# Patient Record
Sex: FEMALE | Race: BLACK OR AFRICAN AMERICAN | NOT HISPANIC OR LATINO | Employment: FULL TIME | URBAN - METROPOLITAN AREA
[De-identification: names, ages, dates, MRNs, and addresses within clinical notes are randomized per-mention and may not be internally consistent; named-entity substitution may affect disease eponyms.]

---

## 2022-05-26 ENCOUNTER — HOSPITAL ENCOUNTER (OUTPATIENT)
Facility: HOSPITAL | Age: 52
Discharge: HOME | End: 2022-05-27
Attending: INTERNAL MEDICINE | Admitting: OBSTETRICS & GYNECOLOGY
Payer: COMMERCIAL

## 2022-05-26 DIAGNOSIS — N93.9 ABNORMAL UTERINE BLEEDING (AUB): ICD-10-CM

## 2022-05-26 DIAGNOSIS — D50.0 IRON DEFICIENCY ANEMIA DUE TO CHRONIC BLOOD LOSS: ICD-10-CM

## 2022-05-26 DIAGNOSIS — D64.9 ANEMIA, UNSPECIFIED TYPE: Primary | ICD-10-CM

## 2022-05-26 PROBLEM — E66.01 OBESITY, CLASS III, BMI 40-49.9 (MORBID OBESITY): Status: ACTIVE | Noted: 2022-05-26

## 2022-05-26 LAB
ALBUMIN SERPL-MCNC: 3.4 GM/DL (ref 3.5–5)
ALBUMIN/GLOB SERPL: 0.8 RATIO (ref 1.1–2)
ALP SERPL-CCNC: 53 UNIT/L (ref 40–150)
ALT SERPL-CCNC: 11 UNIT/L (ref 0–55)
APTT PPP: 29 SECONDS
AST SERPL-CCNC: 16 UNIT/L (ref 5–34)
BASOPHILS # BLD AUTO: 0.08 X10(3)/MCL (ref 0–0.2)
BASOPHILS NFR BLD AUTO: 1.2 %
BILIRUBIN DIRECT+TOT PNL SERPL-MCNC: 0.3 MG/DL
BUN SERPL-MCNC: 15.2 MG/DL (ref 9.8–20.1)
CALCIUM SERPL-MCNC: 9.3 MG/DL (ref 8.4–10.2)
CHLORIDE SERPL-SCNC: 108 MMOL/L (ref 98–107)
CO2 SERPL-SCNC: 25 MMOL/L (ref 22–29)
CREAT SERPL-MCNC: 0.73 MG/DL (ref 0.55–1.02)
EOSINOPHIL # BLD AUTO: 0.22 X10(3)/MCL (ref 0–0.9)
EOSINOPHIL NFR BLD AUTO: 3.4 %
ERYTHROCYTE [DISTWIDTH] IN BLOOD BY AUTOMATED COUNT: 22.3 % (ref 11.5–17)
FSH SERPL-ACNC: 40.05 MIU/ML
GLOBULIN SER-MCNC: 4.1 GM/DL (ref 2.4–3.5)
GLUCOSE SERPL-MCNC: 92 MG/DL (ref 74–100)
GROUP & RH: NORMAL
HCT VFR BLD AUTO: 23.6 % (ref 37–47)
HGB BLD-MCNC: 5.8 GM/DL (ref 12–16)
IMM GRANULOCYTES # BLD AUTO: 0.02 X10(3)/MCL (ref 0–0.02)
IMM GRANULOCYTES NFR BLD AUTO: 0.3 % (ref 0–0.43)
INDIRECT COOMBS GEL: NORMAL
LYMPHOCYTES # BLD AUTO: 1.55 X10(3)/MCL (ref 0.6–4.6)
LYMPHOCYTES NFR BLD AUTO: 23.9 %
MCH RBC QN AUTO: 16 PG (ref 27–31)
MCHC RBC AUTO-ENTMCNC: 24.6 MG/DL (ref 33–36)
MCV RBC AUTO: 65.2 FL (ref 80–94)
MONOCYTES # BLD AUTO: 0.53 X10(3)/MCL (ref 0.1–1.3)
MONOCYTES NFR BLD AUTO: 8.2 %
NEUTROPHILS # BLD AUTO: 4.1 X10(3)/MCL (ref 2.1–9.2)
NEUTROPHILS NFR BLD AUTO: 63 %
NRBC BLD AUTO-RTO: 0 %
PLATELET # BLD AUTO: 150 X10(3)/MCL (ref 130–400)
PLATELETS.RETICULATED NFR BLD AUTO: 5.2 % (ref 0.9–11.2)
PMV BLD AUTO: 0 FL (ref 9.4–12.4)
POTASSIUM SERPL-SCNC: 4.5 MMOL/L (ref 3.5–5.1)
PROT SERPL-MCNC: 7.5 GM/DL (ref 6.4–8.3)
RBC # BLD AUTO: 3.62 X10(6)/MCL (ref 4.2–5.4)
SODIUM SERPL-SCNC: 141 MMOL/L (ref 136–145)
TSH SERPL-ACNC: 0.57 UIU/ML (ref 0.35–4.94)
WBC # SPEC AUTO: 6.5 X10(3)/MCL (ref 4.5–11.5)

## 2022-05-26 PROCEDURE — 85025 COMPLETE CBC W/AUTO DIFF WBC: CPT | Performed by: PHYSICIAN ASSISTANT

## 2022-05-26 PROCEDURE — 99285 EMERGENCY DEPT VISIT HI MDM: CPT | Mod: 25

## 2022-05-26 PROCEDURE — 86900 BLOOD TYPING SEROLOGIC ABO: CPT | Performed by: PHYSICIAN ASSISTANT

## 2022-05-26 PROCEDURE — 85610 PROTHROMBIN TIME: CPT | Performed by: PHYSICIAN ASSISTANT

## 2022-05-26 PROCEDURE — G0378 HOSPITAL OBSERVATION PER HR: HCPCS

## 2022-05-26 PROCEDURE — 86901 BLOOD TYPING SEROLOGIC RH(D): CPT | Performed by: PHYSICIAN ASSISTANT

## 2022-05-26 PROCEDURE — 84443 ASSAY THYROID STIM HORMONE: CPT | Performed by: PHYSICIAN ASSISTANT

## 2022-05-26 PROCEDURE — 83001 ASSAY OF GONADOTROPIN (FSH): CPT | Performed by: STUDENT IN AN ORGANIZED HEALTH CARE EDUCATION/TRAINING PROGRAM

## 2022-05-26 PROCEDURE — 80053 COMPREHEN METABOLIC PANEL: CPT | Performed by: PHYSICIAN ASSISTANT

## 2022-05-26 PROCEDURE — 85730 THROMBOPLASTIN TIME PARTIAL: CPT | Performed by: PHYSICIAN ASSISTANT

## 2022-05-26 PROCEDURE — P9040 RBC LEUKOREDUCED IRRADIATED: HCPCS | Performed by: PHYSICIAN ASSISTANT

## 2022-05-26 PROCEDURE — 86920 COMPATIBILITY TEST SPIN: CPT | Performed by: PHYSICIAN ASSISTANT

## 2022-05-26 PROCEDURE — 36415 COLL VENOUS BLD VENIPUNCTURE: CPT | Performed by: PHYSICIAN ASSISTANT

## 2022-05-26 PROCEDURE — 36410 VNPNXR 3YR/> PHY/QHP DX/THER: CPT

## 2022-05-26 RX ORDER — SODIUM CHLORIDE 0.9 % (FLUSH) 0.9 %
10 SYRINGE (ML) INJECTION EVERY 6 HOURS
Status: DISCONTINUED | OUTPATIENT
Start: 2022-05-27 | End: 2022-05-27 | Stop reason: HOSPADM

## 2022-05-26 RX ORDER — HYDROCODONE BITARTRATE AND ACETAMINOPHEN 500; 5 MG/1; MG/1
TABLET ORAL
Status: DISCONTINUED | OUTPATIENT
Start: 2022-05-26 | End: 2022-05-27 | Stop reason: HOSPADM

## 2022-05-26 RX ORDER — SODIUM CHLORIDE 0.9 % (FLUSH) 0.9 %
10 SYRINGE (ML) INJECTION
Status: DISCONTINUED | OUTPATIENT
Start: 2022-05-26 | End: 2022-05-27 | Stop reason: HOSPADM

## 2022-05-26 NOTE — NURSING
1745- House supervisor contacted for update on midline placement. States does not have specific time but order has been called in.     1800- MD Flores called and updated on midline placement status and the blood has not been started yet due to midline not yet being placed. Notified 3 nurses attempted to start IVs on pt and were unsuccessful. MD states that once pt receives blood that was ordered it is ok to call on call MD MD Damian for pt discharge orders if pt wants to go home.

## 2022-05-26 NOTE — ED PROVIDER NOTES
Name: Kely Denise   Age: 51 y.o.  Sex: female    Chief complaint:   Chief Complaint   Patient presents with    Vaginal Bleeding     Pt requesting gyn referral for abnormal uterine bleeding w abnormal US.  REPORTS ONLY SPOTTING AT PRESENT.       Patient arrived with: Private  History obtained from: Patient    Subjective:   Patient with known hx of uterine fibroids and AUB presents today asking for referral to a gynecologist. She says she has been having AUB for years. At present time she is only spotting. Denies any pelvic pain. She reports hx of Mirena IUD, but says it fell out on its own a few years ago.     History reviewed. No pertinent past medical history.  Past Surgical History:   Procedure Laterality Date     SECTION      CHOLECYSTECTOMY       Social History     Socioeconomic History    Marital status: Single   Tobacco Use    Smoking status: Never Smoker    Smokeless tobacco: Never Used     Review of patient's allergies indicates:   Allergen Reactions    Shellfish containing products         Review of Systems   Constitutional: Negative for chills, fever and malaise/fatigue.   HENT: Negative for sore throat.    Eyes: Negative for pain.   Respiratory: Negative for shortness of breath.    Cardiovascular: Negative for chest pain, palpitations and leg swelling.   Gastrointestinal: Negative for abdominal pain, diarrhea, nausea and vomiting.   Genitourinary: Negative for dysuria.        Vaginal bleeding   Musculoskeletal: Negative for myalgias.   Neurological: Negative for headaches.   Endo/Heme/Allergies: Does not bruise/bleed easily.          Objective:     Initial Vitals [22 0959]   BP Pulse Resp Temp SpO2   (!) 145/79 92 16 97.9 °F (36.6 °C) 100 %      MAP       --            Physical Exam  Vitals reviewed.   Constitutional:       General: She is not in acute distress.     Appearance: Normal appearance. She is obese. She is not ill-appearing, toxic-appearing or diaphoretic.   HENT:       Head: Normocephalic and atraumatic.      Mouth/Throat:      Mouth: Mucous membranes are moist.   Eyes:      Extraocular Movements: Extraocular movements intact.      Conjunctiva/sclera: Conjunctivae normal.   Cardiovascular:      Rate and Rhythm: Normal rate and regular rhythm.      Pulses: Normal pulses.      Heart sounds: Normal heart sounds. No murmur heard.  Pulmonary:      Effort: Pulmonary effort is normal.      Breath sounds: Normal breath sounds.   Abdominal:      General: Abdomen is flat. Bowel sounds are normal.      Palpations: Abdomen is soft.      Tenderness: There is no abdominal tenderness.   Genitourinary:     Comments: Pelvic exam details    External exam: normal    Speculum Exam: Cervical os is closed. Small amount of dark blood noted, but no active bleeding. No blood clots. No abnormal appearing discharge or odor.     Bimanual exam: normal. Exam limited due to body habitus.   Musculoskeletal:         General: No deformity.      Cervical back: Neck supple.   Skin:     General: Skin is warm and dry.      Capillary Refill: Capillary refill takes less than 2 seconds.      Coloration: Skin is not jaundiced or pale.   Neurological:      General: No focal deficit present.      Mental Status: She is alert and oriented to person, place, and time. Mental status is at baseline.   Psychiatric:         Mood and Affect: Mood normal.          Records:  Nursing records and triage records reviewed  Prior records reviewed    Labs:  Recent Results (from the past 24 hour(s))   Comprehensive Metabolic Panel    Collection Time: 05/26/22 11:06 AM   Result Value Ref Range    Sodium Level 141 136 - 145 mmol/L    Potassium Level 4.5 3.5 - 5.1 mmol/L    Chloride 108 (H) 98 - 107 mmol/L    Carbon Dioxide 25 22 - 29 mmol/L    Glucose Level 92 74 - 100 mg/dL    Blood Urea Nitrogen 15.2 9.8 - 20.1 mg/dL    Creatinine 0.73 0.55 - 1.02 mg/dL    Calcium Level Total 9.3 8.4 - 10.2 mg/dL    Protein Total 7.5 6.4 - 8.3 gm/dL     Albumin Level 3.4 (L) 3.5 - 5.0 gm/dL    Globulin 4.1 (H) 2.4 - 3.5 gm/dL    Albumin/Globulin Ratio 0.8 (L) 1.1 - 2.0 ratio    Bilirubin Total 0.3 <=1.5 mg/dL    Alkaline Phosphatase 53 40 - 150 unit/L    Alanine Aminotransferase 11 0 - 55 unit/L    Aspartate Aminotransferase 16 5 - 34 unit/L    Estimated GFR- >60 mls/min/1.73/m2   CBC with Differential    Collection Time: 05/26/22 11:06 AM   Result Value Ref Range    WBC 6.5 4.5 - 11.5 x10(3)/mcL    RBC 3.62 (L) 4.20 - 5.40 x10(6)/mcL    Hgb 5.8 (LL) 12.0 - 16.0 gm/dL    Hct 23.6 (L) 37.0 - 47.0 %    MCV 65.2 (L) 80.0 - 94.0 fL    MCH 16.0 (L) 27.0 - 31.0 pg    MCHC 24.6 (L) 33.0 - 36.0 mg/dL    RDW 22.3 (H) 11.5 - 17.0 %    Platelet 150 130 - 400 x10(3)/mcL    MPV 0.0 (L) 9.4 - 12.4 fL    IPF 5.2 0.9 - 11.2 %    Neut % 63.0 %    Lymph % 23.9 %    Mono % 8.2 %    Eos % 3.4 %    Basophil % 1.2 %    Lymph # 1.55 0.6 - 4.6 x10(3)/mcL    Neut # 4.1 2.1 - 9.2 x10(3)/mcL    Mono # 0.53 0.1 - 1.3 x10(3)/mcL    Eos # 0.22 0 - 0.9 x10(3)/mcL    Baso # 0.08 0 - 0.2 x10(3)/mcL    IG# 0.02 (H) 0 - 0.0155 x10(3)/mcL    IG% 0.3 0 - 0.43 %    NRBC% 0.0 %   TSH    Collection Time: 05/26/22 12:13 PM   Result Value Ref Range    Thyroid Stimulating Hormone 0.5711 0.3500 - 4.9400 uIU/mL   Protime-INR    Collection Time: 05/26/22 12:13 PM   Result Value Ref Range    PT 12.9 seconds    INR 0.99 0.00 - 1.30   APTT    Collection Time: 05/26/22 12:13 PM   Result Value Ref Range    PTT 29.0 <150.0 seconds   Prepare RBC 2 Units; Emergency    Collection Time: 05/26/22 12:57 PM   Result Value Ref Range    UNIT NUMBER [id number]     UNIT ABO/RH O POS     DISPENSE STATUS Selected     Unit Expiration [id number]     Product Code Z4693T19     Unit Blood Type Code 5100     CROSSMATCH INTERPRETATION Compatible     UNIT NUMBER [id number]     UNIT ABO/RH O POS     DISPENSE STATUS Selected     Unit Expiration [id number]     Product Code C6962H04     Unit Blood Type Code 5100      CROSSMATCH INTERPRETATION Compatible    Type & Screen    Collection Time: 05/26/22 12:57 PM   Result Value Ref Range    Group & Rh O POS     Indirect Glenda GEL NEG         Images:  No results found.   Imaging Results          US Pelvis Comp with Transvag NON-OB (xpd (In process)  Result time 05/26/22 13:57:02                   Medications:  Medications   0.9%  NaCl infusion (for blood administration) (has no administration in time range)          Medical decision making:       ED Course as of 05/26/22 1414   Thu May 26, 2022   1255 Hemoglobin(!!): 5.8 [SA]   1255 Hematocrit(!): 23.6 [SA]   1255 Hemoglobin(!!): 5.8  Gynecology consulted for management of AUB and significant anemia. Plan is to transfuse 2u pRBC and admit to obs. See consult note.  [SA]      ED Course User Index  [SA] NADINE Hoang        Procedures     I am Dr. Horner and I performed a face to face evaluation of this patient. This case was initially evaluated and a workup started by Lay Peña under my supervision.    I participated in the following activities of this patient's care: the medical history.   I personally performed: the Brief physical exam, the medical decision making.   Evaluation and management service: I agree with the evaluation and management decisions made in this patient's care.    Discussed the case and recommended on management to PA, talked to consultant also and made discussions about the disposition.     Results interpretation: I agree with the study interpretation in this patient's care     See reevaluation area for my evaluation of the pt.      Diagnosis:  Final diagnoses:  [N93.9] Abnormal uterine bleeding (AUB) (Primary)  [D64.9] Anemia, unspecified type  [D50.0] Iron deficiency anemia due to chronic blood loss     Kely JEN Denise admitted for observation and blood transfusion.           (Please note that this chart was completed via voice to text dictation. There may be typographical errors or  substitutions that are unintentional, or uncorrected. Every attempt was made to proofread the chart prior to completion. If there are any questions, please contact the provider for final clarification).       NADINE Hoang  05/26/22 1416       NADINE Hoang  05/26/22 1416       Estrada Horner MD  05/26/22 4563

## 2022-05-26 NOTE — NURSING
Patient arrived for ICU via wheelchair. Patient is alert and oriented without any distress. Symmetrical unlabored respirations noted.

## 2022-05-26 NOTE — NURSING
Attempted to use IV inserted in ED but it was tender to touch and flush. Dr. Flores gave order to insert midline.

## 2022-05-26 NOTE — H&P
[facility] GYNECOLOGY H&P    Subjective:       Kely Denise is a 51 y.o.  who presented to the ED for AUB. patient notes that she has had heavy menses her entire life, with worsening and more irregular bleeding over the last several years. She is now having waxing and waning bleeding that has been spotting most recently. She notes that she has been anemic in the past but hasn't required blood transfusions and isn't currently taking iron.     She denies lightheadness, dizzyness, nausea, vomiting, pallor, syncope, SOB, or fatigue.    On admit she was noted to have an H/H of 5.8/23.6. Last noted H/H from [facility] in  at which time she was .     This patient has been seen in [facility] GYN clinic for AUB and had an IUD placed at that time. She notes IUD expulsed in  and minimally improved her bleeding. She would like to try another form of medical management at this time and is not ready to proceed with hysterectomy unless Provera fails.     Objective:     PSH:  -- H/o CDx2  -- Cholecystectomy Laparoscopic    PMH:  -- Anemia  -- Class III Obesity    Allergies:  Shellfish containing products (?Iodine) - Hives    Home Medications:  None    VITAL SIGNS: 24 HR MIN & MAX Most Recent Vitals 24 HR Intake & Output   Temp  Min: 97.9 °F (36.6 °C)  Max: 97.9 °F (36.6 °C)  97.9 °F (36.6 °C) No intake/output data recorded.   BP  Min: 119/80  Max: 145/79  119/80     Pulse  Min: 87  Max: 92  87     Resp  Min: 16  Max: 18  18     SpO2  Min: 100 %  Max: 100 %  100 %       BP  Min: 119/80  Max: 145/79  Temp  Av.9 °F (36.6 °C)  Min: 97.9 °F (36.6 °C)  Max: 97.9 °F (36.6 °C)  Pulse  Av.5  Min: 87  Max: 92  Resp  Av  Min: 16  Max: 18  SpO2  Av %  Min: 100 %  Max: 100 %  Height  Av' (152.4 cm)  Min: 5' (152.4 cm)  Max: 5' (152.4 cm)  Weight  Av kg (244 lb 11.4 oz)  Min: 111 kg (244 lb 11.4 oz)  Max: 111 kg (244 lb 11.4 oz)    Body mass index is 47.79 kg/m².    Physical Exam:   General appearance: alert,  appears stated age and cooperative  Lungs: no increased WOB  Heart: regular rate  Abdomen: soft, minimally distended  Pelvic: deferred, completed by ED NP  Extremities: No evidence of DVT seen on physical exam.    Labs:   Recent Labs   Lab 05/26/22  1106   WBC 6.5   HGB 5.8*   HCT 23.6*      MCV 65.2*   RDW 22.3*      K 4.5   CO2 25   BUN 15.2   CREATININE 0.73   CALCIUM 9.3   ALBUMIN 3.4*   BILITOT 0.3   AST 16   ALT 11   ALKPHOS 53     Recent Results (from the past 24 hour(s))   Comprehensive Metabolic Panel    Collection Time: 05/26/22 11:06 AM   Result Value Ref Range    Sodium Level 141 136 - 145 mmol/L    Potassium Level 4.5 3.5 - 5.1 mmol/L    Chloride 108 (H) 98 - 107 mmol/L    Carbon Dioxide 25 22 - 29 mmol/L    Glucose Level 92 74 - 100 mg/dL    Blood Urea Nitrogen 15.2 9.8 - 20.1 mg/dL    Creatinine 0.73 0.55 - 1.02 mg/dL    Calcium Level Total 9.3 8.4 - 10.2 mg/dL    Protein Total 7.5 6.4 - 8.3 gm/dL    Albumin Level 3.4 (L) 3.5 - 5.0 gm/dL    Globulin 4.1 (H) 2.4 - 3.5 gm/dL    Albumin/Globulin Ratio 0.8 (L) 1.1 - 2.0 ratio    Bilirubin Total 0.3 <=1.5 mg/dL    Alkaline Phosphatase 53 40 - 150 unit/L    Alanine Aminotransferase 11 0 - 55 unit/L    Aspartate Aminotransferase 16 5 - 34 unit/L    Estimated GFR- >60 mls/min/1.73/m2   CBC with Differential    Collection Time: 05/26/22 11:06 AM   Result Value Ref Range    WBC 6.5 4.5 - 11.5 x10(3)/mcL    RBC 3.62 (L) 4.20 - 5.40 x10(6)/mcL    Hgb 5.8 (LL) 12.0 - 16.0 gm/dL    Hct 23.6 (L) 37.0 - 47.0 %    MCV 65.2 (L) 80.0 - 94.0 fL    MCH 16.0 (L) 27.0 - 31.0 pg    MCHC 24.6 (L) 33.0 - 36.0 mg/dL    RDW 22.3 (H) 11.5 - 17.0 %    Platelet 150 130 - 400 x10(3)/mcL    MPV 0.0 (L) 9.4 - 12.4 fL    IPF 5.2 0.9 - 11.2 %    Neut % 63.0 %    Lymph % 23.9 %    Mono % 8.2 %    Eos % 3.4 %    Basophil % 1.2 %    Lymph # 1.55 0.6 - 4.6 x10(3)/mcL    Neut # 4.1 2.1 - 9.2 x10(3)/mcL    Mono # 0.53 0.1 - 1.3 x10(3)/mcL    Eos # 0.22 0 - 0.9  x10(3)/mcL    Baso # 0.08 0 - 0.2 x10(3)/mcL    IG# 0.02 (H) 0 - 0.0155 x10(3)/mcL    IG% 0.3 0 - 0.43 %    NRBC% 0.0 %   TSH    Collection Time: 22 12:13 PM   Result Value Ref Range    Thyroid Stimulating Hormone 0.5711 0.3500 - 4.9400 uIU/mL   Protime-INR    Collection Time: 22 12:13 PM   Result Value Ref Range    PT 12.9 seconds    INR 0.99 0.00 - 1.30   APTT    Collection Time: 22 12:13 PM   Result Value Ref Range    PTT 29.0 <150.0 seconds     Lines   PIV    Assessment/Plan:       51 y.o.  with history of AUB to anemia, being admitted to observation for transfusion 2u pRBCs.    Active Problem List with Overview Notes    Diagnosis Date Noted    Anemia 2022     -- H/H on presentation 5.8/23.4.  -- Patient remains clinically stable from a vitals and symptomatic standpoint but given critical H/H recommendation was to receive 2u pRBCs. Patient verbalized understanding and amenable to proceeding with transfusion.   -- Will discharge patient with PO iron.       Abnormal uterine bleeding (AUB) 2022     -- TSH normal  -- Follow-up transvaginal ultrasound, FSH  -- Discharge patient with Provera 10mg daily to be continued as patient desires medical management at this time.   -- For EMB in clinic next week and re-establish care with [facility] GYN clinic      Obesity, Class III, BMI 40-49.9 (morbid obesity) 2022     Disposition:   Anticipated disposition plan: discharge patient to home once transfusion is complete    Discussed with Dr. Provost Janet Flores MD PGY-3  Obstetrics and Gynecology

## 2022-05-27 VITALS
WEIGHT: 244.69 LBS | DIASTOLIC BLOOD PRESSURE: 81 MMHG | RESPIRATION RATE: 18 BRPM | SYSTOLIC BLOOD PRESSURE: 118 MMHG | BODY MASS INDEX: 48.04 KG/M2 | TEMPERATURE: 98 F | OXYGEN SATURATION: 99 % | HEART RATE: 95 BPM | HEIGHT: 60 IN

## 2022-05-27 DIAGNOSIS — D69.6 THROMBOCYTOPENIA: Primary | ICD-10-CM

## 2022-05-27 LAB
ABO + RH BLD: NORMAL
ABO + RH BLD: NORMAL
BASOPHILS # BLD AUTO: 0.06 X10(3)/MCL (ref 0–0.2)
BASOPHILS NFR BLD AUTO: 0.8 %
BLD PROD TYP BPU: NORMAL
BLD PROD TYP BPU: NORMAL
BLOOD UNIT EXPIRATION DATE: NORMAL
BLOOD UNIT EXPIRATION DATE: NORMAL
BLOOD UNIT TYPE CODE: 5100
BLOOD UNIT TYPE CODE: 5100
CROSSMATCH INTERPRETATION: NORMAL
CROSSMATCH INTERPRETATION: NORMAL
DISPENSE STATUS: NORMAL
DISPENSE STATUS: NORMAL
EOSINOPHIL # BLD AUTO: 0.18 X10(3)/MCL (ref 0–0.9)
EOSINOPHIL NFR BLD AUTO: 2.4 %
ERYTHROCYTE [DISTWIDTH] IN BLOOD BY AUTOMATED COUNT: 24.4 % (ref 11.5–17)
HCT VFR BLD AUTO: 29.1 % (ref 37–47)
HGB BLD-MCNC: 7.6 GM/DL (ref 12–16)
IMM GRANULOCYTES # BLD AUTO: 0.03 X10(3)/MCL (ref 0–0.02)
IMM GRANULOCYTES NFR BLD AUTO: 0.4 % (ref 0–0.43)
LYMPHOCYTES # BLD AUTO: 1.19 X10(3)/MCL (ref 0.6–4.6)
LYMPHOCYTES NFR BLD AUTO: 15.6 %
MCH RBC QN AUTO: 18.6 PG (ref 27–31)
MCHC RBC AUTO-ENTMCNC: 26.1 MG/DL (ref 33–36)
MCV RBC AUTO: 71.3 FL (ref 80–94)
MONOCYTES # BLD AUTO: 0.11 X10(3)/MCL (ref 0.1–1.3)
MONOCYTES NFR BLD AUTO: 1.4 %
NEUTROPHILS # BLD AUTO: 6.1 X10(3)/MCL (ref 2.1–9.2)
NEUTROPHILS NFR BLD AUTO: 79.4 %
NRBC BLD AUTO-RTO: 0 %
PLATELET # BLD AUTO: 125 X10(3)/MCL (ref 130–400)
PLATELETS.RETICULATED NFR BLD AUTO: 4.6 % (ref 0.9–11.2)
PMV BLD AUTO: 0 FL (ref 9.4–12.4)
RBC # BLD AUTO: 4.08 X10(6)/MCL (ref 4.2–5.4)
UNIT NUMBER: NORMAL
UNIT NUMBER: NORMAL
WBC # SPEC AUTO: 7.6 X10(3)/MCL (ref 4.5–11.5)

## 2022-05-27 PROCEDURE — A4216 STERILE WATER/SALINE, 10 ML: HCPCS | Performed by: OBSTETRICS & GYNECOLOGY

## 2022-05-27 PROCEDURE — G0378 HOSPITAL OBSERVATION PER HR: HCPCS

## 2022-05-27 PROCEDURE — 36430 TRANSFUSION BLD/BLD COMPNT: CPT

## 2022-05-27 PROCEDURE — 94760 N-INVAS EAR/PLS OXIMETRY 1: CPT

## 2022-05-27 PROCEDURE — 36415 COLL VENOUS BLD VENIPUNCTURE: CPT | Performed by: STUDENT IN AN ORGANIZED HEALTH CARE EDUCATION/TRAINING PROGRAM

## 2022-05-27 PROCEDURE — 25000003 PHARM REV CODE 250: Performed by: OBSTETRICS & GYNECOLOGY

## 2022-05-27 PROCEDURE — P9016 RBC LEUKOCYTES REDUCED: HCPCS | Performed by: PHYSICIAN ASSISTANT

## 2022-05-27 PROCEDURE — 85025 COMPLETE CBC W/AUTO DIFF WBC: CPT | Performed by: STUDENT IN AN ORGANIZED HEALTH CARE EDUCATION/TRAINING PROGRAM

## 2022-05-27 RX ORDER — ACETAMINOPHEN 500 MG
1000 TABLET ORAL EVERY 8 HOURS PRN
Status: DISCONTINUED | OUTPATIENT
Start: 2022-05-27 | End: 2022-05-27 | Stop reason: HOSPADM

## 2022-05-27 RX ORDER — MEDROXYPROGESTERONE ACETATE 10 MG/1
10 TABLET ORAL DAILY
Qty: 30 TABLET | Refills: 4 | Status: SHIPPED | OUTPATIENT
Start: 2022-05-27 | End: 2022-10-26 | Stop reason: SDUPTHER

## 2022-05-27 RX ADMIN — SODIUM CHLORIDE, PRESERVATIVE FREE 10 ML: 5 INJECTION INTRAVENOUS at 12:05

## 2022-05-27 RX ADMIN — ACETAMINOPHEN 1000 MG: 500 TABLET ORAL at 03:05

## 2022-05-27 NOTE — DISCHARGE SUMMARY
[facility]  Gynecology  Discharge Summary    Patient Name: Kely Denise  MRN: [id number]  Admission Date: 5/26/2022  Hospital Length of Stay: 0 days  Discharge Date: 5/27/2022  Attending Physician: Susie Thibodeaux MD   Discharging Provider: Janet Flores MD    HPI: 52 y/o female with a long standing history of AUB-L to anemia who presented to the ED with persistent vaginal spotting/bleeding. While in the ED she was noted to have an H/H of 5.8/23.6. She was admitted to observation to receive 2u pRBCs. Patient tolerated the blood well and is feeling well this AM. Repeat H/H 7.6/29.1.     Hospital Course: 52 y/o female with a long standing history of AUB-L to anemia who presented to the ED with persistent vaginal spotting/bleeding. While in the ED she was noted to have an H/H of 5.8/23.6. She was admitted to observation to receive 2u pRBCs. Patient tolerated the blood well and is feeling well this AM. Repeat H/H 7.6/29.1.     Upon review of the differential of the CBC it was noted that she had a significant drop in her platelet count from 150 to 125. Will place a consultation to internal medicine at this time for further work-up and repeat CBC at follow-up visit.    Vitals:    05/27/22 0025 05/27/22 0035 05/27/22 0245 05/27/22 0746   BP: 115/74 113/74 117/80 118/81   Pulse: 86 86 80 95   Resp: 20 20 18    Temp: 98.7 °F (37.1 °C) 98.3 °F (36.8 °C) 98.5 °F (36.9 °C) 98.4 °F (36.9 °C)   TempSrc: Oral Oral Oral Oral   SpO2: 99% 99% 100% 99%   Weight:       Height:         PE:  Constit: AAOx3, NAD  CV: Regular rate  Pulm: CTABL, no increased WOB  Abd: soft, non-distended  Ext: Normal, atraumatic without edema    Significant Diagnostic Studies: Labs:   Recent Labs   Lab 05/26/22  1106      K 4.5   CO2 25   BUN 15.2   CREATININE 0.73   CALCIUM 9.3   ALBUMIN 3.4*   BILITOT 0.3   ALKPHOS 53   AST 16   ALT 11     Recent Labs   Lab 05/26/22  1106 05/27/22  0434   WBC 6.5 7.6   HGB 5.8* 7.6*   HCT 23.6* 29.1*   PLT  150 125*     FSH: 40  TSH: 0.57    Discharged Condition: good  Disposition: To home care    Follow Up: In GYN clinic this upcoming week  Referral provided to Internal Medicine for further work-up of CBC abnormalities    Patient Instructions:   -- Regular diet  -- Activity as tolerated  -- RTC if soaking through >2 pads/hour, Fever >100.4, severe lower abdominal pain/cramping not relieved by medication    Medications:  -- Start PO Provera 10mg    Janet Flores MD  Obstetrics & Gynecology  [facility] - [facility] Surg Telemetry

## 2022-05-27 NOTE — PLAN OF CARE
Problem: Adult Inpatient Plan of Care  Goal: Plan of Care Review  Outcome: Met  Goal: Patient-Specific Goal (Individualized)  Outcome: Met  Goal: Absence of Hospital-Acquired Illness or Injury  Outcome: Met  Goal: Optimal Comfort and Wellbeing  Outcome: Met  Goal: Readiness for Transition of Care  Outcome: Met     Problem: Bariatric Environmental Safety  Goal: Safety Maintained with Care  Outcome: Met     Problem: Infection  Goal: Absence of Infection Signs and Symptoms  Outcome: Met     Problem: Anemia  Goal: Anemia Symptom Improvement  Outcome: Met

## 2022-05-27 NOTE — PROCEDURES
Kely Denise is a 51 y.o. female patient.    Temp: 98.1 °F (36.7 °C) (05/26/22 1900)  Pulse: 80 (05/26/22 1900)  Resp: 16 (05/26/22 1600)  BP: 97/62 (05/26/22 1900)  SpO2: 100 % (05/26/22 1900)  Weight: 111 kg (244 lb 11.4 oz) (05/26/22 1420)  Height: 5' (152.4 cm) (05/26/22 1420)    PICC  Date/Time: 5/26/2022 8:45 PM  Performed by: Aba Hoffman RN  Consent Done: Yes  Time out: Immediately prior to procedure a time out was called to verify the correct patient, procedure, equipment, support staff and site/side marked as required  Indications: med administration and vascular access  Anesthesia: local infiltration  Local anesthetic: lidocaine 1% without epinephrine  Anesthetic Total (mL): 5  Preparation: skin prepped with ChloraPrep  Skin prep agent dried: skin prep agent completely dried prior to procedure  Sterile barriers: all five maximum sterile barriers used - cap, mask, sterile gown, sterile gloves, and large sterile sheet  Hand hygiene: hand hygiene performed prior to central venous catheter insertion  Location details: right cephalic  Catheter type: single lumen  Catheter size: 4 Fr  Catheter Length: 16cm    Ultrasound guidance: yes  Vessel Caliber: large and patent, compressibility normal  Needle advanced into vessel with real time Ultrasound guidance.  Guidewire confirmed in vessel.  Sterile sheath used.  Number of attempts: 1  Post-procedure: blood return through all ports, chlorhexidine patch and sterile dressing applied            Mihaela Hoffman  5/26/2022

## 2022-05-30 ENCOUNTER — TELEPHONE (OUTPATIENT)
Dept: GYNECOLOGY | Facility: CLINIC | Age: 52
End: 2022-05-30
Payer: COMMERCIAL

## 2022-06-01 ENCOUNTER — OFFICE VISIT (OUTPATIENT)
Dept: GYNECOLOGY | Facility: CLINIC | Age: 52
End: 2022-06-01
Payer: COMMERCIAL

## 2022-06-01 VITALS
TEMPERATURE: 99 F | SYSTOLIC BLOOD PRESSURE: 129 MMHG | RESPIRATION RATE: 18 BRPM | HEART RATE: 99 BPM | DIASTOLIC BLOOD PRESSURE: 63 MMHG | BODY MASS INDEX: 49.26 KG/M2 | WEIGHT: 250.88 LBS | OXYGEN SATURATION: 99 % | HEIGHT: 60 IN

## 2022-06-01 DIAGNOSIS — Z00.00 HEALTH CARE MAINTENANCE: ICD-10-CM

## 2022-06-01 DIAGNOSIS — Z12.4 SCREENING FOR CERVICAL CANCER: Primary | ICD-10-CM

## 2022-06-01 DIAGNOSIS — N93.9 ABNORMAL UTERINE BLEEDING (AUB): ICD-10-CM

## 2022-06-01 DIAGNOSIS — D50.0 IRON DEFICIENCY ANEMIA DUE TO CHRONIC BLOOD LOSS: ICD-10-CM

## 2022-06-01 DIAGNOSIS — Z12.31 ENCOUNTER FOR SCREENING MAMMOGRAM FOR MALIGNANT NEOPLASM OF BREAST: ICD-10-CM

## 2022-06-01 PROCEDURE — 87625 HPV TYPES 16 & 18 ONLY: CPT

## 2022-06-01 PROCEDURE — 58100 BIOPSY OF UTERUS LINING: CPT | Mod: PBBFAC,73 | Performed by: STUDENT IN AN ORGANIZED HEALTH CARE EDUCATION/TRAINING PROGRAM

## 2022-06-01 PROCEDURE — 99215 OFFICE O/P EST HI 40 MIN: CPT | Mod: PBBFAC,25

## 2022-06-01 RX ORDER — METHOCARBAMOL 750 MG/1
750 TABLET, FILM COATED ORAL 2 TIMES DAILY
COMMUNITY
Start: 2022-05-20 | End: 2022-10-26

## 2022-06-01 RX ORDER — HYDROCODONE BITARTRATE AND ACETAMINOPHEN 5; 325 MG/1; MG/1
1 TABLET ORAL 2 TIMES DAILY PRN
COMMUNITY
Start: 2022-05-20 | End: 2022-06-29

## 2022-06-01 RX ORDER — MELOXICAM 15 MG/1
15 TABLET ORAL DAILY
Status: ON HOLD | COMMUNITY
Start: 2022-05-20 | End: 2022-06-09 | Stop reason: HOSPADM

## 2022-06-01 NOTE — PROGRESS NOTES
[facility] GYNECOLOGY CLINIC NOTE     Kely Denise is a 51 y.o.  presenting to GYN clinic for follow up following admission - for blood transfusion in setting of AUB-L. Received 2 units pRBCs, H/H barbara to 7.6/29.1 from 5.8/23.6. Started on provera 10mg daily during admission, states bleeding stopped prior to discharge from hospital, has not recurred since this discharge. Denies CP, SOB, lightheadedness or dizziness today.   Pt last seen by us in . Had EMB in clinic notable for endometrial polyp with focal atypical hyperplasia, hysteroscopy D&C subsequently done, pathology notable for secretory endometrium. Pt lost to follow up after . Pt states mirena fell out in , did not help bleeding significantly. At that time did not desire surgical management.     OB History        2    Para   2    Term   2            AB        Living   2       SAB        IAB        Ectopic        Multiple        Live Births   2           Obstetric Comments   BB 8lb 4oz  Triad: -            Past Medical History:   Diagnosis Date    Abnormal Pap smear of cervix     Anemia       Past Surgical History:   Procedure Laterality Date     SECTION       SECTION      CHOLECYSTECTOMY      DILATION AND CURETTAGE OF UTERUS      TUBAL LIGATION        Current Outpatient Medications   Medication Instructions    HYDROcodone-acetaminophen (NORCO) 5-325 mg per tablet 1 tablet, Oral, 2 times daily PRN    medroxyPROGESTERone (PROVERA) 10 mg, Oral, Daily    meloxicam (MOBIC) 15 mg, Oral, Daily    methocarbamoL (ROBAXIN) 750 mg, Oral, 2 times daily     Social History     Tobacco Use    Smoking status: Never Smoker    Smokeless tobacco: Never Used   Substance Use Topics    Alcohol use: Not Currently    Drug use: Never       Review of Systems  Pertinent items are noted in HPI.      Objective:     /63 (BP Location: Left arm)   Pulse 99   Temp 98.6 °F (37 °C)   Resp 18   Ht 5' (1.524  m)   Wt 113.8 kg (250 lb 14.1 oz)   LMP 2022 (Approximate) Comment: 3 months no period  SpO2 99%   BMI 49.00 kg/m²   Physical Exam:  Gen: Well-nourished, well-developed female appearing stated age. Alert, cooperative, in no acute distress.  CV: regular rate  Chest: unlabored WOB  Abdomen: Soft, non-tender  Incisions: well healed infraumbilical vertical scar  Extrem: Extremities normal, atraumatic, non-tender calves.  External genitalia: Normal female genetalia without lesion, discharge or tenderness.   Speculum Exam: Vaginal vault with scant old bood nonodorous, no lesions/masses seen.  Cervical os visualized as closed, no lesions/masses.   Bimanual Exam: No cervical motion tenderness. 18-19cm uterus, bulky fibroid in lower segment. Nonmobile, poor descent.   Pap smear collected today   Note: RN chaperone present for entirety of exam.    Relevant Imaging:  Impression:     Bulky enlarged heterogeneous uterus with multiple fibroids largest measuring 7 cm x 7.3 x 7.7.     Endometrial stripe was not optimally seen.     Anechoic area in the right adnexal region with measurements above sub of difficult to ascertain whether it is of ovarian origin repeat exam or other imaging modalities might prove helpful for further assessment.     Left ovary was not identified      Procedures:   Endometrial biopsy attempted but not completed due to unable to advance Pipelle beyond cervix due to likely obstructing fibroids.     Assessment:       51 y.o.  here for AUB-L follow up.  1. Screening for cervical cancer  Liquid-Based Pap Smear, Screening Screening   2. Abnormal uterine bleeding (AUB)  Ambulatory referral/consult to Gynecology   3. Encounter for screening mammogram for malignant neoplasm of breast  Mammo Digital Screening Bilat   4. Anemia     5. Health care maintenance            Plan:       Problem List Items Addressed This Visit        Renal/    Abnormal uterine bleeding (AUB)     Patient with risk factors  for endometrial hyperplasia including obesity and age. EMB attempted in office but unsuccessful due to obstructing fibroids. Given risk factors discussed need for endometrial sampling. Encouraged pt to continue provera. Given heavy bleeding requiring blood transfusion, large uterus and multiple fibroids on imaging informed patient surgical management via hysterectomy will likely be warranted for definitive management, however need endometrial sampling prior to discussing next steps. Will send to preop to discuss hysteroscopy D&C.               Oncology    Anemia     Continue provera to control bleeding, PO iron to increase blood counts.               Other    Health care maintenance     Pap smear collected today             Other Visit Diagnoses     Screening for cervical cancer    -  Primary    Relevant Orders    Liquid-Based Pap Smear, Screening Screening    Encounter for screening mammogram for malignant neoplasm of breast        Relevant Orders    Mammo Digital Screening Bilat           Return to clinic for preoperative visit    Discussed patient and plan with Dr. Jsesy Roche MD, MPH  [facility], PGY2

## 2022-06-02 DIAGNOSIS — N93.9 ABNORMAL UTERINE BLEEDING (AUB): Primary | ICD-10-CM

## 2022-06-02 DIAGNOSIS — D25.9 UTERINE LEIOMYOMA, UNSPECIFIED LOCATION: ICD-10-CM

## 2022-06-02 NOTE — ASSESSMENT & PLAN NOTE
Patient with risk factors for endometrial hyperplasia including obesity and age. EMB attempted in office but unsuccessful due to obstructing fibroids. Given risk factors discussed need for endometrial sampling. Encouraged pt to continue provera. Given heavy bleeding requiring blood transfusion, large uterus and multiple fibroids on imaging informed patient surgical management via hysterectomy will likely be warranted for definitive management, however need endometrial sampling prior to discussing next steps. Will send to preop to discuss hysteroscopy D&C.

## 2022-06-02 NOTE — PROGRESS NOTES
Surgery Date: 6/9/2022  Pre-operative visit: 6/8/2022    Janet Flores MD PGY-3  Obstetrics and Gynecology

## 2022-06-03 ENCOUNTER — ANESTHESIA EVENT (OUTPATIENT)
Dept: SURGERY | Facility: HOSPITAL | Age: 52
End: 2022-06-03
Payer: COMMERCIAL

## 2022-06-03 NOTE — ANESTHESIA PREPROCEDURE EVALUATION
06/03/2022  Kely eDnise is a 51 y.o., female with PMHx of morbid obesity presents for hysteroscopy, D&C.    COVID STATUS: vaccinated x 2 (last dose 2/12/21)    BETA-BLOCKER: NONE    PROBLEM LIST:  -  AUB, UTERINE FIBROIDS      - S/P 3/10/15 HYST. D&C  -  UPT STATUS  -  MORBID OBESITY  -  ANEMIA - 5/26/22 H&H 5/23, 2 UNITs PRBC --> 5/27/22 H&H 7/29, [id number]      - HOSP. 5/26-5/27/22 for TRANSFUSION  -  CERVICAL, THORACIC (T9-10 CENTRAL DISC PROTRUSION) & LUMBAR DISC DISEASE (L3-4, L4-5, L5-S1 DISC BULGES)    AM Rx DOS: NORCO PRN    ORDERS -   SURGEON: 1/28/15 EKG; 5/26/22 CMP, PT/PTT, TSH; 5/27/22 CBC;  ANESTHESIA: UPT    Pre-op Assessment    I have reviewed the NPO Status.      Review of Systems  Anesthesia Hx:  No problems with previous Anesthesia    Social:  Non-Smoker    Cardiovascular:  Cardiovascular Normal     Pulmonary:  Pulmonary Normal    Renal/:  Renal/ Normal     Hepatic/GI:  Hepatic/GI Normal    Neurological:  Neurology Normal    Endocrine:  Morbid Obesity / BMI > 40    Vitals:    06/09/22 0736 06/09/22 0748 06/09/22 0819 06/09/22 0840   BP:  126/74  126/74   BP Location:  Left arm     Patient Position:  Lying     Pulse:  88     Resp:  16 16    Temp:  36.4 °C (97.5 °F)     TempSrc:  Oral     SpO2:  98%     Weight: 113.7 kg (250 lb 10.6 oz)            Physical Exam  General: Alert, Cooperative and Well nourished    Airway:  Mallampati: II   Mouth Opening: Normal  TM Distance: Normal  Tongue: Normal  Neck ROM: Normal ROM    Dental:  Intact    Chest/Lungs:  Clear to auscultation, Normal Respiratory Rate    Heart:  Rate: Normal  Rhythm: Regular Rhythm  Sounds: Normal       Latest Reference Range & Units 06/09/22 07:55   Preg Test, Ur Negative  Negative     Lab Results   Component Value Date    WBC 7.6 05/27/2022    HGB 7.6 (L) 05/27/2022    HCT 29.1 (L) 05/27/2022    MCV 71.3 (L) 05/27/2022      (L) 05/27/2022       CMP  Sodium Level   Date Value Ref Range Status   05/26/2022 141 136 - 145 mmol/L Final     Potassium Level   Date Value Ref Range Status   05/26/2022 4.5 3.5 - 5.1 mmol/L Final     Carbon Dioxide   Date Value Ref Range Status   05/26/2022 25 22 - 29 mmol/L Final     Blood Urea Nitrogen   Date Value Ref Range Status   05/26/2022 15.2 9.8 - 20.1 mg/dL Final     Creatinine   Date Value Ref Range Status   05/26/2022 0.73 0.55 - 1.02 mg/dL Final     Calcium Level Total   Date Value Ref Range Status   05/26/2022 9.3 8.4 - 10.2 mg/dL Final     Albumin Level   Date Value Ref Range Status   05/26/2022 3.4 (L) 3.5 - 5.0 gm/dL Final     Bilirubin Total   Date Value Ref Range Status   05/26/2022 0.3 <=1.5 mg/dL Final     Alkaline Phosphatase   Date Value Ref Range Status   05/26/2022 53 40 - 150 unit/L Final     Aspartate Aminotransferase   Date Value Ref Range Status   05/26/2022 16 5 - 34 unit/L Final     Alanine Aminotransferase   Date Value Ref Range Status   05/26/2022 11 0 - 55 unit/L Final         Anesthesia Plan  Type of Anesthesia, risks & benefits discussed:    Anesthesia Type: Gen ETT  Intra-op Monitoring Plan: Standard ASA Monitors  Post Op Pain Control Plan: multimodal analgesia  Induction:  IV  Airway Plan: Direct  Informed Consent: Informed consent signed with the Patient and all parties understand the risks and agree with anesthesia plan.  All questions answered.   ASA Score: 2  Day of Surgery Review of History & Physical: H&P Update referred to the surgeon/provider.    Ready For Surgery From Anesthesia Perspective.     .

## 2022-06-06 NOTE — PROGRESS NOTES
[facility] Preoperative Visit History and Physical    HPI:   51 y.o.  with a long standing history of AUB-L presents to discuss hysteroscopy D&C. Patient recently evaluated in ED for AUB and symptomatic anemia and received 2 units pRBCs and was started on provera 10mg, which she has continued with no further bleeding. Failed EMB the following week.     She was first evaluated for AUB in  with EMB showing focal hyperplasia with atypia, HDC notable for secretory endometrium, NEM. Mirena IUD placed at that time, expulsed. Lost to follow up after , stated she has had continued heavy bleeding.      She presents today for preoperative visit for hysteroscopy D&C for endometrial sampling. Note shellfish allergy, will not use betadine for prep.      PMH:  Mild chronic hypertension (110s-140s/60s-90s upon chart review)  Anemia  Chronic back pain  Class III Obesity (50)     ObHx:  LTCS X2     GynHx:  Triad:   LMP: 2022; has had intermittent spotting since  Contraception: BTL  Perimenopausal; FSH 40  Remote hx trichomonas and chlamydia, s/p tx  Remote hx abnorma pap; denies biopsy or excisional procedure     SurgHx:  LTCS x2  BTL  Laparoscopic cholecystectomy     FamHx:  Denies history of breast, ovarian, endometrial, colon cancers.    SocialHx:  Denies tobacco/alcohol/illicit drug use.    All:  Shellfish  Reaction: Hives    Meds:  Mobic daily  Robaxin daily  Provera 10mg daily  Ferrous sulfate    OB History    Para Term  AB Living   2 2 2     2   SAB IAB Ectopic Multiple Live Births           2      # Outcome Date GA Lbr Kevin/2nd Weight Sex Delivery Anes PTL Lv   2 Term     M CS-LTranv  N LESLIE   1 Term     F CS-LTranv  N LESLIE      Obstetric Comments   BB 8lb 4oz   Triad:      Past Medical History:   Diagnosis Date    Abnormal Pap smear of cervix     Anemia      Past Surgical History:   Procedure Laterality Date     SECTION       SECTION       CHOLECYSTECTOMY      DILATION AND CURETTAGE OF UTERUS      TUBAL LIGATION       Prior to Admission medications    Medication Sig Start Date End Date Taking? Authorizing Provider   HYDROcodone-acetaminophen (NORCO) 5-325 mg per tablet Take 1 tablet by mouth 2 (two) times daily as needed. 22   Historical Provider   medroxyPROGESTERone (PROVERA) 10 MG tablet Take 1 tablet (10 mg total) by mouth once daily. 5/27/22 10/24/22  Janet Flores MD   meloxicam (MOBIC) 15 MG tablet Take 15 mg by mouth once daily. 22   Historical Provider   methocarbamoL (ROBAXIN) 750 MG Tab Take 750 mg by mouth 2 (two) times daily. 22   Historical Provider       Family History   Problem Relation Age of Onset    Hypertension Mother        Review of Systems: As stated in HPI.      Objective:     There were no vitals filed for this visit.  There is no height or weight on file to calculate BMI.    PHYSICAL EXAM (pelvic from 22)  GEN: NAD, A&O x3  CV: RRR   LUNGS: CTABL  ABDOMEN: soft, NTND, no masses  INCISIONS: well healed infraumbilical  VAGINA: Moist, no lesions or masses  VULVA: Normal external genitalia  CERVIX: Cervical os visualized as closed, no lesions/masses. No CMT  UTERUS: 18 cm in size, bulky posterior fibroid. Not mobile, poor descent, broad, smooth in contour  ADNEXA: No fullness, masses or tenderness    LABS:  H/H 5.8/23.6 to 7.6/29.1  Last pap: 22- NILM hrHPV neg    IMAGING:  TVUS:   Impression:     Bulky enlarged heterogeneous uterus with multiple fibroids largest measuring 7 cm x 7.3 x 7.7.     Endometrial stripe was not optimally seen.     Anechoic area in the right adnexal region with measurements above sub of difficult to ascertain whether it is of ovarian origin repeat exam or other imaging modalities might prove helpful for further assessment.     Left ovary was not identified.    Assessment:      51 y.o.  with AUB-L presents to discuss [facility] D&C for endometrial sampling.   1. Preoperative exam  for gynecologic surgery     2. Abnormal uterine bleeding (AUB)       Plan:     Problem List Items Addressed This Visit        Renal/    Abnormal uterine bleeding (AUB)     Continue provera 10mg daily for AUB-L since patient does not desire hysterectomy at this time.               Other    Preoperative exam for gynecologic surgery - Primary       1. Counseling: Discussed with patient importance of diagnostic endometrial sampling via hysteroscopy D&C, this allowes for direct visualization of endometrial cavity with directed sampling, and more thorough sampling of endometrium. Counseled patient that removal of endometrial polyp and or fibroids will likely decrease bleeding but amenorrhea cannot be guaranteed. Discussed alternative hormonal options to reduce abnormal bleeding.   2. This procedure and its risks, reasons, benefits and complications (including perforation, subsequent injury to bowel, air/gas embolism, fluid overload, major blood vessel,hemorrhage, possibility of transfusion, infection, scarring, dyspareunia, further surgery, failure of the procedure) were reviewed in detail. Complication rate less than 1% overall.   3. We have reviewed the typical perioperative course with hospital stay, and post-operative precautions and restrictions have been reviewed.    4. Transfusion of blood and blood products discussed. Patient was consented for blood transfusion in the case of an emergency.  She understands the possibility of blood transfusion reaction and the attendant risk of transmission of HIV & Hepatitis C to be 1 in 2 million and the risk of Hepatitis B to be 1 in 200,000.  She is aware of the possibility of transfusion reaction. All questions were answered.   5. Patient understands we are affiliated with a teaching institution and residents and medical students will be involved in her care.  She has consented to an exam under anesthesia and understands that medical students participate in this portion of  the procedure.  All questions were answered.    6. Preop testing ordered.  Surgery case scheduled. Surgical consents signed.  7. Instructions reviewed, including NPO after midnight.   8. Counseled to hold the following medications on the day of surgery: none   9. Current contraception: s/p BTL                   To OR for hysteroscopy D&C with Dr. Thibodeaux   Scheduled on 6/9/22    Discussed with Dr. Provost Va Roche MD, MPH  [facility], PGY2

## 2022-06-08 ENCOUNTER — OFFICE VISIT (OUTPATIENT)
Dept: GYNECOLOGY | Facility: CLINIC | Age: 52
End: 2022-06-08
Payer: COMMERCIAL

## 2022-06-08 ENCOUNTER — TELEPHONE (OUTPATIENT)
Dept: GYNECOLOGY | Facility: CLINIC | Age: 52
End: 2022-06-08
Payer: COMMERCIAL

## 2022-06-08 VITALS
BODY MASS INDEX: 49.22 KG/M2 | OXYGEN SATURATION: 97 % | HEIGHT: 60 IN | RESPIRATION RATE: 18 BRPM | TEMPERATURE: 99 F | SYSTOLIC BLOOD PRESSURE: 136 MMHG | HEART RATE: 94 BPM | WEIGHT: 250.69 LBS | DIASTOLIC BLOOD PRESSURE: 85 MMHG

## 2022-06-08 DIAGNOSIS — N93.9 ABNORMAL UTERINE BLEEDING (AUB): ICD-10-CM

## 2022-06-08 DIAGNOSIS — Z01.818 PREOPERATIVE EXAM FOR GYNECOLOGIC SURGERY: Primary | ICD-10-CM

## 2022-06-08 LAB
HIGH RISK HPV 16 (PRECISION): NEGATIVE
HIGH RISK HPV 18/45 (PRECISION): NEGATIVE
INSULIN SERPL-ACNC: NORMAL U[IU]/ML
LAB AP BETHESDA CATEGORY: NORMAL
LAB AP CLINICAL FINDINGS: NORMAL
LAB AP GYN MOLECULAR TESTING: NORMAL
LAB AP LMP DATE: NORMAL
LAB AP OCHS PAP SPECIMEN ADEQUACY: NORMAL
LAB AP OHS PAP INTERPRETATION: NORMAL
LAB AP PAP DISCLAIMER COMMENTS: NORMAL
LAB AP PAP PREVIOUS BX: NORMAL
LAB AP PAP PRIOR TREATMENT: NORMAL

## 2022-06-08 PROCEDURE — 99214 OFFICE O/P EST MOD 30 MIN: CPT | Mod: PBBFAC

## 2022-06-08 NOTE — ASSESSMENT & PLAN NOTE
1. Counseling: Discussed with patient importance of diagnostic endometrial sampling via hysteroscopy D&C, this allowes for direct visualization of endometrial cavity with directed sampling, and more thorough sampling of endometrium. Counseled patient that removal of endometrial polyp and or fibroids will likely decrease bleeding but amenorrhea cannot be guaranteed. Discussed alternative hormonal options to reduce abnormal bleeding.   2. This procedure and its risks, reasons, benefits and complications (including perforation, subsequent injury to bowel, air/gas embolism, fluid overload, major blood vessel,hemorrhage, possibility of transfusion, infection, scarring, dyspareunia, further surgery, failure of the procedure) were reviewed in detail. Complication rate less than 1% overall.   3. We have reviewed the typical perioperative course with hospital stay, and post-operative precautions and restrictions have been reviewed.    4. Transfusion of blood and blood products discussed. Patient was consented for blood transfusion in the case of an emergency.  She understands the possibility of blood transfusion reaction and the attendant risk of transmission of HIV & Hepatitis C to be 1 in 2 million and the risk of Hepatitis B to be 1 in 200,000.  She is aware of the possibility of transfusion reaction. All questions were answered.   5. Patient understands we are affiliated with a teaching institution and residents and medical students will be involved in her care.  She has consented to an exam under anesthesia and understands that medical students participate in this portion of the procedure.  All questions were answered.    6. Preop testing ordered.  Surgery case scheduled. Surgical consents signed.  7. Instructions reviewed, including NPO after midnight.   8. Counseled to hold the following medications on the day of surgery: none   9. Current contraception: s/p BTL

## 2022-06-09 ENCOUNTER — ANESTHESIA (OUTPATIENT)
Dept: SURGERY | Facility: HOSPITAL | Age: 52
End: 2022-06-09
Payer: COMMERCIAL

## 2022-06-09 ENCOUNTER — HOSPITAL ENCOUNTER (OUTPATIENT)
Facility: HOSPITAL | Age: 52
Discharge: HOME | End: 2022-06-09
Attending: OBSTETRICS & GYNECOLOGY | Admitting: OBSTETRICS & GYNECOLOGY
Payer: COMMERCIAL

## 2022-06-09 DIAGNOSIS — N93.9 ABNORMAL UTERINE BLEEDING (AUB): Primary | ICD-10-CM

## 2022-06-09 DIAGNOSIS — Z01.818 PREOPERATIVE EXAM FOR GYNECOLOGIC SURGERY: ICD-10-CM

## 2022-06-09 DIAGNOSIS — D21.9 FIBROID: ICD-10-CM

## 2022-06-09 DIAGNOSIS — Z98.890 STATUS POST HYSTEROSCOPY: ICD-10-CM

## 2022-06-09 DIAGNOSIS — D64.9 ANEMIA, UNSPECIFIED TYPE: ICD-10-CM

## 2022-06-09 DIAGNOSIS — N85.2 BULKY OR ENLARGED UTERUS: ICD-10-CM

## 2022-06-09 LAB
B-HCG UR QL: NEGATIVE
BASOPHILS # BLD AUTO: 0.04 X10(3)/MCL (ref 0–0.2)
BASOPHILS NFR BLD AUTO: 0.5 %
CTP QC/QA: YES
EOSINOPHIL # BLD AUTO: 0.24 X10(3)/MCL (ref 0–0.9)
EOSINOPHIL NFR BLD AUTO: 2.9 %
ERYTHROCYTE [DISTWIDTH] IN BLOOD BY AUTOMATED COUNT: 27.1 % (ref 11.5–17)
GROUP & RH: NORMAL
HCT VFR BLD AUTO: 32.2 % (ref 37–47)
HGB BLD-MCNC: 8.5 GM/DL (ref 12–16)
IMM GRANULOCYTES # BLD AUTO: 0.02 X10(3)/MCL (ref 0–0.02)
IMM GRANULOCYTES NFR BLD AUTO: 0.2 % (ref 0–0.43)
INDIRECT COOMBS GEL: NORMAL
LYMPHOCYTES # BLD AUTO: 1.47 X10(3)/MCL (ref 0.6–4.6)
LYMPHOCYTES NFR BLD AUTO: 17.6 %
MCH RBC QN AUTO: 19.6 PG (ref 27–31)
MCHC RBC AUTO-ENTMCNC: 26.4 MG/DL (ref 33–36)
MCV RBC AUTO: 74.4 FL (ref 80–94)
MONOCYTES # BLD AUTO: 0.82 X10(3)/MCL (ref 0.1–1.3)
MONOCYTES NFR BLD AUTO: 9.8 %
NEUTROPHILS # BLD AUTO: 5.7 X10(3)/MCL (ref 2.1–9.2)
NEUTROPHILS NFR BLD AUTO: 69 %
NRBC BLD AUTO-RTO: 0 %
PLATELET # BLD AUTO: 599 X10(3)/MCL (ref 130–400)
PMV BLD AUTO: 10.5 FL (ref 9.4–12.4)
RBC # BLD AUTO: 4.33 X10(6)/MCL (ref 4.2–5.4)
WBC # SPEC AUTO: 8.3 X10(3)/MCL (ref 4.5–11.5)

## 2022-06-09 PROCEDURE — 25000003 PHARM REV CODE 250: Performed by: ANESTHESIOLOGY

## 2022-06-09 PROCEDURE — 71000033 HC RECOVERY, INTIAL HOUR: Performed by: OBSTETRICS & GYNECOLOGY

## 2022-06-09 PROCEDURE — 63600175 PHARM REV CODE 636 W HCPCS: Performed by: NURSE ANESTHETIST, CERTIFIED REGISTERED

## 2022-06-09 PROCEDURE — 36000707: Performed by: OBSTETRICS & GYNECOLOGY

## 2022-06-09 PROCEDURE — 36415 COLL VENOUS BLD VENIPUNCTURE: CPT | Performed by: STUDENT IN AN ORGANIZED HEALTH CARE EDUCATION/TRAINING PROGRAM

## 2022-06-09 PROCEDURE — 25000003 PHARM REV CODE 250: Performed by: NURSE ANESTHETIST, CERTIFIED REGISTERED

## 2022-06-09 PROCEDURE — 25000003 PHARM REV CODE 250: Performed by: STUDENT IN AN ORGANIZED HEALTH CARE EDUCATION/TRAINING PROGRAM

## 2022-06-09 PROCEDURE — 86850 RBC ANTIBODY SCREEN: CPT | Performed by: STUDENT IN AN ORGANIZED HEALTH CARE EDUCATION/TRAINING PROGRAM

## 2022-06-09 PROCEDURE — 85025 COMPLETE CBC W/AUTO DIFF WBC: CPT | Performed by: STUDENT IN AN ORGANIZED HEALTH CARE EDUCATION/TRAINING PROGRAM

## 2022-06-09 PROCEDURE — 37000009 HC ANESTHESIA EA ADD 15 MINS: Performed by: OBSTETRICS & GYNECOLOGY

## 2022-06-09 PROCEDURE — 71000016 HC POSTOP RECOV ADDL HR: Performed by: OBSTETRICS & GYNECOLOGY

## 2022-06-09 PROCEDURE — 63600175 PHARM REV CODE 636 W HCPCS

## 2022-06-09 PROCEDURE — 36000706: Performed by: OBSTETRICS & GYNECOLOGY

## 2022-06-09 PROCEDURE — 81025 URINE PREGNANCY TEST: CPT | Performed by: NURSE PRACTITIONER

## 2022-06-09 PROCEDURE — 63600175 PHARM REV CODE 636 W HCPCS: Performed by: ANESTHESIOLOGY

## 2022-06-09 PROCEDURE — 63600175 PHARM REV CODE 636 W HCPCS: Performed by: STUDENT IN AN ORGANIZED HEALTH CARE EDUCATION/TRAINING PROGRAM

## 2022-06-09 PROCEDURE — C1782 MORCELLATOR: HCPCS | Performed by: OBSTETRICS & GYNECOLOGY

## 2022-06-09 PROCEDURE — 71000015 HC POSTOP RECOV 1ST HR: Performed by: OBSTETRICS & GYNECOLOGY

## 2022-06-09 PROCEDURE — 37000008 HC ANESTHESIA 1ST 15 MINUTES: Performed by: OBSTETRICS & GYNECOLOGY

## 2022-06-09 RX ORDER — ONDANSETRON 4 MG/1
8 TABLET, ORALLY DISINTEGRATING ORAL EVERY 8 HOURS PRN
Status: DISCONTINUED | OUTPATIENT
Start: 2022-06-09 | End: 2022-06-09 | Stop reason: HOSPADM

## 2022-06-09 RX ORDER — LIDOCAINE HYDROCHLORIDE 10 MG/ML
1 INJECTION, SOLUTION EPIDURAL; INFILTRATION; INTRACAUDAL; PERINEURAL ONCE
Status: ACTIVE | OUTPATIENT
Start: 2022-06-09

## 2022-06-09 RX ORDER — MIDAZOLAM HYDROCHLORIDE 1 MG/ML
2 INJECTION INTRAMUSCULAR; INTRAVENOUS ONCE AS NEEDED
Status: COMPLETED | OUTPATIENT
Start: 2022-06-09 | End: 2022-06-09

## 2022-06-09 RX ORDER — SODIUM CHLORIDE, SODIUM LACTATE, POTASSIUM CHLORIDE, CALCIUM CHLORIDE 600; 310; 30; 20 MG/100ML; MG/100ML; MG/100ML; MG/100ML
INJECTION, SOLUTION INTRAVENOUS CONTINUOUS
Status: ACTIVE | OUTPATIENT
Start: 2022-06-09

## 2022-06-09 RX ORDER — DEXAMETHASONE SODIUM PHOSPHATE 4 MG/ML
INJECTION, SOLUTION INTRA-ARTICULAR; INTRALESIONAL; INTRAMUSCULAR; INTRAVENOUS; SOFT TISSUE
Status: DISCONTINUED | OUTPATIENT
Start: 2022-06-09 | End: 2022-06-09

## 2022-06-09 RX ORDER — ROCURONIUM BROMIDE 10 MG/ML
INJECTION, SOLUTION INTRAVENOUS
Status: DISCONTINUED | OUTPATIENT
Start: 2022-06-09 | End: 2022-06-09

## 2022-06-09 RX ORDER — PROCHLORPERAZINE EDISYLATE 5 MG/ML
5 INJECTION INTRAMUSCULAR; INTRAVENOUS EVERY 6 HOURS PRN
Status: DISCONTINUED | OUTPATIENT
Start: 2022-06-09 | End: 2022-06-09 | Stop reason: HOSPADM

## 2022-06-09 RX ORDER — MORPHINE SULFATE 10 MG/ML
INJECTION INTRAMUSCULAR; INTRAVENOUS; SUBCUTANEOUS
Status: COMPLETED
Start: 2022-06-09 | End: 2022-06-09

## 2022-06-09 RX ORDER — OXYCODONE HYDROCHLORIDE 5 MG/1
5 TABLET ORAL
Status: DISCONTINUED | OUTPATIENT
Start: 2022-06-09 | End: 2022-06-09 | Stop reason: HOSPADM

## 2022-06-09 RX ORDER — MORPHINE SULFATE 10 MG/ML
2 INJECTION INTRAMUSCULAR; INTRAVENOUS; SUBCUTANEOUS EVERY 5 MIN PRN
Status: COMPLETED | OUTPATIENT
Start: 2022-06-09 | End: 2022-06-09

## 2022-06-09 RX ORDER — TRAMADOL HYDROCHLORIDE 50 MG/1
50 TABLET ORAL
Status: COMPLETED | OUTPATIENT
Start: 2022-06-09 | End: 2022-06-09

## 2022-06-09 RX ORDER — FENTANYL CITRATE 50 UG/ML
INJECTION, SOLUTION INTRAMUSCULAR; INTRAVENOUS
Status: DISCONTINUED | OUTPATIENT
Start: 2022-06-09 | End: 2022-06-09

## 2022-06-09 RX ORDER — GLYCOPYRROLATE 0.2 MG/ML
INJECTION INTRAMUSCULAR; INTRAVENOUS
Status: DISCONTINUED | OUTPATIENT
Start: 2022-06-09 | End: 2022-06-09

## 2022-06-09 RX ORDER — DIPHENHYDRAMINE HCL 25 MG
25 CAPSULE ORAL EVERY 4 HOURS PRN
Status: DISCONTINUED | OUTPATIENT
Start: 2022-06-09 | End: 2022-06-09 | Stop reason: HOSPADM

## 2022-06-09 RX ORDER — ONDANSETRON 2 MG/ML
INJECTION INTRAMUSCULAR; INTRAVENOUS
Status: DISCONTINUED | OUTPATIENT
Start: 2022-06-09 | End: 2022-06-09

## 2022-06-09 RX ORDER — MISOPROSTOL 100 UG/1
200 TABLET ORAL ONCE
Status: COMPLETED | OUTPATIENT
Start: 2022-06-09 | End: 2022-06-09

## 2022-06-09 RX ORDER — ONDANSETRON 2 MG/ML
4 INJECTION INTRAMUSCULAR; INTRAVENOUS DAILY PRN
Status: DISCONTINUED | OUTPATIENT
Start: 2022-06-09 | End: 2022-06-09

## 2022-06-09 RX ORDER — MEPERIDINE HYDROCHLORIDE 25 MG/ML
12.5 INJECTION INTRAMUSCULAR; INTRAVENOUS; SUBCUTANEOUS EVERY 10 MIN PRN
Status: DISCONTINUED | OUTPATIENT
Start: 2022-06-09 | End: 2022-06-09

## 2022-06-09 RX ORDER — LIDOCAINE HYDROCHLORIDE 20 MG/ML
INJECTION, SOLUTION EPIDURAL; INFILTRATION; INTRACAUDAL; PERINEURAL
Status: DISCONTINUED | OUTPATIENT
Start: 2022-06-09 | End: 2022-06-09

## 2022-06-09 RX ORDER — ACETAMINOPHEN 500 MG
1000 TABLET ORAL
Status: COMPLETED | OUTPATIENT
Start: 2022-06-09 | End: 2022-06-09

## 2022-06-09 RX ORDER — CEFAZOLIN SODIUM 1 G/3ML
INJECTION, POWDER, FOR SOLUTION INTRAMUSCULAR; INTRAVENOUS
Status: DISCONTINUED | OUTPATIENT
Start: 2022-06-09 | End: 2022-06-09

## 2022-06-09 RX ORDER — KETOROLAC TROMETHAMINE 30 MG/ML
INJECTION, SOLUTION INTRAMUSCULAR; INTRAVENOUS
Status: DISCONTINUED | OUTPATIENT
Start: 2022-06-09 | End: 2022-06-09

## 2022-06-09 RX ORDER — NEOSTIGMINE METHYLSULFATE 1 MG/ML
INJECTION, SOLUTION INTRAVENOUS
Status: DISCONTINUED | OUTPATIENT
Start: 2022-06-09 | End: 2022-06-09

## 2022-06-09 RX ORDER — MUPIROCIN 20 MG/G
OINTMENT TOPICAL
Status: DISCONTINUED | OUTPATIENT
Start: 2022-06-09 | End: 2022-06-09 | Stop reason: HOSPADM

## 2022-06-09 RX ORDER — SODIUM CHLORIDE, SODIUM LACTATE, POTASSIUM CHLORIDE, CALCIUM CHLORIDE 600; 310; 30; 20 MG/100ML; MG/100ML; MG/100ML; MG/100ML
INJECTION, SOLUTION INTRAVENOUS CONTINUOUS
Status: DISCONTINUED | OUTPATIENT
Start: 2022-06-09 | End: 2022-06-09 | Stop reason: HOSPADM

## 2022-06-09 RX ORDER — DIPHENHYDRAMINE HYDROCHLORIDE 50 MG/ML
25 INJECTION INTRAMUSCULAR; INTRAVENOUS EVERY 4 HOURS PRN
Status: DISCONTINUED | OUTPATIENT
Start: 2022-06-09 | End: 2022-06-09 | Stop reason: HOSPADM

## 2022-06-09 RX ORDER — IBUPROFEN 600 MG/1
600 TABLET ORAL EVERY 6 HOURS PRN
Qty: 56 TABLET | Refills: 0 | Status: SHIPPED | OUTPATIENT
Start: 2022-06-09 | End: 2022-06-16 | Stop reason: SDUPTHER

## 2022-06-09 RX ORDER — PROPOFOL 10 MG/ML
VIAL (ML) INTRAVENOUS
Status: DISCONTINUED | OUTPATIENT
Start: 2022-06-09 | End: 2022-06-09

## 2022-06-09 RX ADMIN — MORPHINE SULFATE 2 MG: 10 INJECTION, SOLUTION INTRAMUSCULAR; INTRAVENOUS at 11:06

## 2022-06-09 RX ADMIN — SODIUM CHLORIDE, POTASSIUM CHLORIDE, SODIUM LACTATE AND CALCIUM CHLORIDE: 600; 310; 30; 20 INJECTION, SOLUTION INTRAVENOUS at 08:06

## 2022-06-09 RX ADMIN — MIDAZOLAM HYDROCHLORIDE 2 MG: 1 INJECTION, SOLUTION INTRAMUSCULAR; INTRAVENOUS at 08:06

## 2022-06-09 RX ADMIN — LIDOCAINE HYDROCHLORIDE 80 MG: 20 INJECTION, SOLUTION EPIDURAL; INFILTRATION; INTRACAUDAL; PERINEURAL at 09:06

## 2022-06-09 RX ADMIN — TRAMADOL HYDROCHLORIDE 50 MG: 50 TABLET, COATED ORAL at 08:06

## 2022-06-09 RX ADMIN — KETOROLAC TROMETHAMINE 30 MG: 30 INJECTION, SOLUTION INTRAMUSCULAR; INTRAVENOUS at 09:06

## 2022-06-09 RX ADMIN — ACETAMINOPHEN 1000 MG: 500 TABLET ORAL at 08:06

## 2022-06-09 RX ADMIN — DEXAMETHASONE SODIUM PHOSPHATE 8 MG: 4 INJECTION, SOLUTION INTRA-ARTICULAR; INTRALESIONAL; INTRAMUSCULAR; INTRAVENOUS; SOFT TISSUE at 09:06

## 2022-06-09 RX ADMIN — OXYCODONE HYDROCHLORIDE 5 MG: 5 TABLET ORAL at 11:06

## 2022-06-09 RX ADMIN — GLYCOPYRROLATE 0.8 MG: 0.2 INJECTION INTRAMUSCULAR; INTRAVENOUS at 10:06

## 2022-06-09 RX ADMIN — PROPOFOL 200 MG: 10 INJECTION, EMULSION INTRAVENOUS at 09:06

## 2022-06-09 RX ADMIN — ONDANSETRON 4 MG: 2 INJECTION INTRAMUSCULAR; INTRAVENOUS at 10:06

## 2022-06-09 RX ADMIN — FENTANYL CITRATE 100 MCG: 50 INJECTION INTRAMUSCULAR; INTRAVENOUS at 09:06

## 2022-06-09 RX ADMIN — ROCURONIUM BROMIDE 40 MG: 10 SOLUTION INTRAVENOUS at 09:06

## 2022-06-09 RX ADMIN — NEOSTIGMINE METHYLSULFATE 5 MG: 1 INJECTION INTRAVENOUS at 10:06

## 2022-06-09 RX ADMIN — MISOPROSTOL 200 MCG: 100 TABLET ORAL at 08:06

## 2022-06-09 RX ADMIN — MEPERIDINE HYDROCHLORIDE 12.5 MG: 25 INJECTION INTRAMUSCULAR; INTRAVENOUS; SUBCUTANEOUS at 11:06

## 2022-06-09 NOTE — TRANSFER OF CARE
Anesthesia Transfer of Care Note    Patient: Kely Denise    Procedure(s) Performed: Procedure(s) (LRB):  HYSTEROSCOPY, WITH DILATION AND CURETTAGE OF UTERUS (N/A)    Patient location: PACU    Anesthesia Type: general    Transport from OR: Transported from OR on room air with adequate spontaneous ventilation    Post pain: adequate analgesia    Post assessment: no apparent anesthetic complications    Post vital signs: stable    Level of consciousness: sedated    Nausea/Vomiting: no nausea/vomiting    Complications: none    Transfer of care protocol was followed      VSS IN PACU

## 2022-06-09 NOTE — OP NOTE
[facility]  Gynecology  Operative Note    SUMMARY     Date of Procedure: 6/9/2022     Procedure: Procedure(s) (LRB):  HYSTEROSCOPY, WITH DILATION AND CURETTAGE OF UTERUS (N/A)     Surgeon(s) and Role:     * Susie Thibodeaux MD - Primary     * Danielle Damian MD - Resident - Assisting     * Va Roche MD - Resident - Assisting    Pre-Operative Diagnosis: Abnormal uterine bleeding (AUB) [N93.9]  Fibroid uterus  Uteromegaly   Anemia    Post-Operative Diagnosis: Post-Op Diagnosis Codes:     * Abnormal uterine bleeding (AUB) [N93.9]  Fibroid uterus  Uteromegaly   Anemia    Anesthesia: General    Estimated Blood Loss: 50 mL  UOP: 50 ml clear yellow urine  IVF: 750 ml  Fluid deficit: 75 mL    Operative Findings (including complications, if any): Normal appearing external genitalia. Well estrogenized vaginal mucosa. Cervix without lesions or masses, scant blood at external os. 16 week size uterus, large anterior fibroid, 7-8cm. Uterus mobile, good capacity, moderate descent. No adnexal masses.      Hysteroscopy: Large anterior fibroid occupying approx 80% of uterine cavity. Thin proliferative phase endometrium throughout, filmy tissue noted inferiorly in uterine cavity.Tubal ostia not visualized. No cervical masses nor polyps.         Description of Technical Procedures:  The patient was taken to the operating room with IVF running. SCDs were placed on bilateral lower extremities for DVT prophylaxis.  General anesthesia was obtained without difficulty and found to be adequate.  She was placed in the dorsal lithotomy position with legs in Remi type stirrups.  Exam under anesthesia revealed the findings as above. She was prepped and draped in the normal sterile fashion. A straight catheter was placed to drain the bladder. A weighted speculum was placed into posterior aspect of the vagina. A right angle retractor was placed into the anterior aspect of the vagina. The anterior lip of the  cervix was grasped with a single-toothed tenaculum.  The cervix was sequentially dilated to 5mm using Hegar dialators. A 0 degree myosure hysteroscope was introduced into the cervix with hydrodistension. The above findings were then noted. Photographs were taken of the endometrial cavity. Myosure device then introduced through hysteroscope into endometrial cavity, directed biopsies taken of filmy tissue described above.The hysteroscope was then removed. The uterus was curetted in a clockwise fashion until a gritty feeling was noted in all aspects of the uterus. The directed biopsies and endometrial scrapings were sent to pathology. The tenaculum was removed from the cervix and bleeding noted on the left, repaired with 3-0 vicryl on SH. Hemostasis then noted from both tenaculum sites.     The patient tolerated the procedure well. The instrument and sponge counts were correct times two. The patient awaked from anesthesia and was taken to recovery in stable condition.    Dr. Thibodeaux was present for the entire procedure           Implants: * No implants in log *    Specimens:   Specimen (24h ago, onward)             Start     Ordered    06/09/22 1037  Specimen to Pathology  RELEASE UPON ORDERING        References:    Click here for ordering Quick Tip   Question:  Release to patient  Answer:  Immediate    06/09/22 1037                        Condition: Good    Disposition: PACU - hemodynamically stable.    Va Roche MD, MPH  [facility], PGY2

## 2022-06-09 NOTE — ANESTHESIA PROCEDURE NOTES
Intubation    Date/Time: 6/9/2022 9:32 AM  Performed by: Lauryn Simmons CRNA  Authorized by: Kaitlin Montez MD     Intubation:     Induction:  Intravenous    Intubated:  Postinduction    Attempts:  1    Attempted By:  CRNA    Method of Intubation:  Direct    Blade:  Madden 2    Laryngeal View Grade: Grade I - full view of cords      Difficult Airway Encountered?: No      Complications:  None    Airway Device:  Oral endotracheal tube    Airway Device Size:  7.0    Style/Cuff Inflation:  Cuffed (inflated to minimal occlusive pressure)    Inflation Amount (mL):  5    Tube secured:  21    Secured at:  The lips    Placement Verified By:  Capnometry    Complicating Factors:  None    Findings Post-Intubation:  BS equal bilateral and atraumatic/condition of teeth unchanged

## 2022-06-09 NOTE — DISCHARGE SUMMARY
[facility] - Periop Services  Brief Operative Note/Discharge Summary    Surgery Date: 6/9/2022     Surgeon(s) and Role:     * Susie Thibodeaux MD - Primary     * Danielle Damian MD - Resident - Assisting     * Va Roche MD - Resident - Assisting    Pre-op Diagnosis:   Abnormal uterine bleeding (AUB) [N93.9]  Fibroid uterus  Uteromegaly   Anemia    Post-op Diagnosis:  Post-Op Diagnosis Codes:     * Abnormal uterine bleeding (AUB) [N93.9]  Fibroid uterus  Uteromegaly   Anemia    Procedure(s) (LRB):  HYSTEROSCOPY, WITH DILATION AND CURETTAGE OF UTERUS (N/A)    Anesthesia: General    Operative Findings: Normal appearing external genitalia. Well estrogenized vaginal mucosa. Cervix without lesions or masses, scant blood at external os. 16 week size uterus, large anterior fibroid, 7-8cm. Uterus mobile, good capacity, moderate descent. No adnexal masses.     Hysteroscopy: Large anterior fibroid occupying approx 80% of uterine cavity. Thin proliferative phase endometrium throughout, filmy tissue noted inferiorly in uterine cavity.Tubal ostia not visualized. No cervical masses nor polyps.         Estimated Blood Loss: 50 mL  UOP: 50 ml clear yellow urine  IVF: 750 ml  Fluid deficit: 75 mL    Specimens:   Specimen (24h ago, onward)             Start     Ordered    06/09/22 1037  Specimen to Pathology  RELEASE UPON ORDERING        References:    Click here for ordering Quick Tip   Question:  Release to patient  Answer:  Immediate    06/09/22 1037              Discharge Note    OUTCOME: Patient tolerated treatment/procedure well without complication and is now ready for discharge.    DISPOSITION: Home or Self Care    FINAL DIAGNOSIS:  Abnormal uterine bleeding (AUB); uterine fibroids; uteromegaly    FOLLOWUP: In clinic    DISCHARGE INSTRUCTIONS:    Discharge Procedure Orders   Diet Adult Regular     Diet general     Pelvic Rest   Order Comments: For 1-2 weeks     Notify your health care provider if you  experience any of the following:  temperature >100.4     Notify your health care provider if you experience any of the following:  severe uncontrolled pain     Notify your health care provider if you experience any of the following:   Order Comments: Soaking >1 pad per hour, foul smelling discharge.     Pelvic Rest     Call MD for:   Order Comments: Call clinic or go to ED with fever >101, intractable nausea/vomiting with inability to tolerate PO, heavy vaginal bleeding, pain refractory to medications     No dressing needed     Activity as tolerated     Va Roche MD, MPH  [facility], PGY2

## 2022-06-09 NOTE — H&P
[facility] Preop H&P Update     I have seen and examined the patient on the morning of her surgery. Patient endorses spotting since yesterday evening. Otherwise there are no changes in her medical history since the time the H&P was performed.     /74 (BP Location: Left arm, Patient Position: Lying)   Pulse 88   Temp 97.5 °F (36.4 °C) (Oral)   Resp 16   Wt 113.7 kg (250 lb 10.6 oz)   SpO2 98%   Breastfeeding No   BMI 48.95 kg/m²        She would like her son Jamil ([phone]) to be contacted after her surgery and the details of her surgery shared.   She was able to explain the procedure in her own words.  Postoperative care was briefly re-reviewed    She desires to proceed with surgery as planned this morning.    ERAS Protocol   DVT PPX: SCDs  ABX: none    TO OR for hysteroscopy D&C    Va Roche MD, MPH  [facility], PGY2      HPI:   51 y.o.  with a long standing history of AUB-L, anemia, uteromegaly (18cm uterus) presents to discuss hysteroscopy D&C. Patient recently evaluated in ED for AUB and symptomatic anemia and received 2 units pRBCs and was started on provera 10mg, which she has continued with no further bleeding. Failed EMB the following week.      She was first evaluated for AUB in  with EMB showing focal hyperplasia with atypia, HDC notable for secretory endometrium, NEM. Mirena IUD placed at that time, expulsed. Lost to follow up after , stated she has had continued heavy bleeding.      She presents today for preoperative visit for hysteroscopy D&C for endometrial sampling. Note shellfish allergy, will not use betadine for prep.      PMH:  Mild chronic hypertension (110s-140s/60s-90s upon chart review)  Anemia  Chronic back pain  Class III Obesity (49)      ObHx:  LTCS X2      GynHx:  Triad: -  LMP: 2022; has had intermittent spotting since  Contraception: BTL  Perimenopausal; FSH 40  Remote hx trichomonas and chlamydia, s/p tx  Remote hx abnorma pap; denies biopsy  or excisional procedure     SurgHx:  LTCS x2  BTL  Laparoscopic cholecystectomy      FamHx:  Denies history of breast, ovarian, endometrial, colon cancers.     SocialHx:  Denies tobacco/alcohol/illicit drug use.     All:  Shellfish  Reaction: Hives     Meds:  Mobic daily  Robaxin daily  Provera 10mg daily  Ferrous sulfate                      OB History    Para Term  AB Living   2 2 2     2   SAB IAB Ectopic Multiple Live Births              2          # Outcome Date GA Lbr Kevin/2nd Weight Sex Delivery Anes PTL Lv   2 Term        M CS-LTranv   N LESLIE   1 Term        F CS-LTranv   N LESLIE       Obstetric Comments   BB 8lb 4oz   Triad: -      Past Medical History:   Diagnosis Date    Abnormal Pap smear of cervix      Anemia              Past Surgical History:   Procedure Laterality Date     SECTION         SECTION        CHOLECYSTECTOMY        DILATION AND CURETTAGE OF UTERUS        TUBAL LIGATION                  Prior to Admission medications    Medication Sig Start Date End Date Taking? Authorizing Provider   HYDROcodone-acetaminophen (NORCO) 5-325 mg per tablet Take 1 tablet by mouth 2 (two) times daily as needed. 22     Historical Provider   medroxyPROGESTERone (PROVERA) 10 MG tablet Take 1 tablet (10 mg total) by mouth once daily. 5/27/22 10/24/22   Janet Flores MD   meloxicam (MOBIC) 15 MG tablet Take 15 mg by mouth once daily. 22     Historical Provider   methocarbamoL (ROBAXIN) 750 MG Tab Take 750 mg by mouth 2 (two) times daily. 22     Historical Provider               Family History   Problem Relation Age of Onset    Hypertension Mother           Review of Systems: As stated in HPI.       Objective:      Vitals   There were no vitals filed for this visit.     There is no height or weight on file to calculate BMI.     PHYSICAL EXAM (pelvic from 22)  GEN: NAD, A&O x3  CV: RRR   LUNGS: CTABL  ABDOMEN: soft, NTND, no masses  INCISIONS: well  healed infraumbilical  VAGINA: Moist, no lesions or masses  VULVA: Normal external genitalia  CERVIX: Cervical os visualized as closed, no lesions/masses. No CMT  UTERUS: 18 cm in size, bulky posterior fibroid. Not mobile, poor descent, broad, smooth in contour  ADNEXA: No fullness, masses or tenderness     LABS:  H/H 5.8/23.6 to 7.6/29.1  Last pap: 22- NILM hrHPV neg     IMAGING:  TVUS:   Impression:     Bulky enlarged heterogeneous uterus with multiple fibroids largest measuring 7 cm x 7.3 x 7.7.     Endometrial stripe was not optimally seen.     Anechoic area in the right adnexal region with measurements above sub of difficult to ascertain whether it is of ovarian origin repeat exam or other imaging modalities might prove helpful for further assessment.     Left ovary was not identified.     Assessment:      51 y.o.  with AUB-L presents to discuss HSC D&C for endometrial sampling.   1. Preoperative exam for gynecologic surgery      2. Abnormal uterine bleeding (AUB)         Plan:           Problem List Items Addressed This Visit            Renal/      Abnormal uterine bleeding (AUB)        Continue provera 10mg daily for AUB-L since patient does not desire hysterectomy at this time.                      Other      Preoperative exam for gynecologic surgery - Primary           1. Counseling: Discussed with patient importance of diagnostic endometrial sampling via hysteroscopy D&C, this allowes for direct visualization of endometrial cavity with directed sampling, and more thorough sampling of endometrium. Counseled patient that removal of endometrial polyp and or fibroids will likely decrease bleeding but amenorrhea cannot be guaranteed. Discussed alternative hormonal options to reduce abnormal bleeding.   2. This procedure and its risks, reasons, benefits and complications (including perforation, subsequent injury to bowel, air/gas embolism, fluid overload, major blood vessel,hemorrhage, possibility  of transfusion, infection, scarring, dyspareunia, further surgery, failure of the procedure) were reviewed in detail. Complication rate less than 1% overall.   3. We have reviewed the typical perioperative course with hospital stay, and post-operative precautions and restrictions have been reviewed.    4. Transfusion of blood and blood products discussed. Patient was consented for blood transfusion in the case of an emergency.  She understands the possibility of blood transfusion reaction and the attendant risk of transmission of HIV & Hepatitis C to be 1 in 2 million and the risk of Hepatitis B to be 1 in 200,000.  She is aware of the possibility of transfusion reaction. All questions were answered.   5. Patient understands we are affiliated with a teaching institution and residents and medical students will be involved in her care.  She has consented to an exam under anesthesia and understands that medical students participate in this portion of the procedure.  All questions were answered.    6. Preop testing ordered.  Surgery case scheduled. Surgical consents signed.  7. Instructions reviewed, including NPO after midnight.   8. Counseled to hold the following medications on the day of surgery: none   9. Current contraception: s/p BTL                          To OR for hysteroscopy D&C with Dr. Thibodeaux   Scheduled on 6/9/22     Discussed with Dr. Provost Va Roche MD, MPH  [facility], PGY2

## 2022-06-09 NOTE — ANESTHESIA POSTPROCEDURE EVALUATION
Anesthesia Post Evaluation    Patient: Kely Denise    Procedure(s) Performed: Procedure(s) (LRB):  HYSTEROSCOPY, WITH DILATION AND CURETTAGE OF UTERUS (N/A)    Final Anesthesia Type: general      Patient location during evaluation: PACU  Level of consciousness: awake  Post-procedure vital signs: reviewed and stable  Airway patency: patent      Anesthetic complications: no      Cardiovascular status: hemodynamically stable  Respiratory status: spontaneous ventilation  Follow-up not needed.          Vitals Value Taken Time   /79 06/09/22 1125   Temp 36.4 °C (97.5 °F) 06/09/22 1120   Pulse 74 06/09/22 1125   Resp 18 06/09/22 1153   SpO2 99 % 06/09/22 1125         Event Time   Out of Recovery 11:35:00         Pain/Herminia Score: Pain Rating Prior to Med Admin: 10 (6/9/2022 11:53 AM)  Pain Rating Post Med Admin: 6 (6/9/2022 11:20 AM)  Herminia Score: 10 (6/9/2022 11:35 AM)

## 2022-06-09 NOTE — DISCHARGE INSTRUCTIONS
[facility]-will call you to set up your follow up appointment    · Pelvic rest for ___2 weeks_ (NO baths, NO soaking, tampons, douches or sex).    · Take pain medication as prescribed. (Ibuprofen) If you are experiencing severe pain even with your pain medications, please call the Womens clinic at [phone] to notify your doctor.    · Take over the counter stool softener such as Colace and/or laxatives such as Miralax for constipation. Do not strain to have a bowel movement.    · Brace belly with pillow when coughing/sneezing/deep breathing.    · No driving or consuming alcohol for the next 24 hours.    · Some bleeding/spotting is to be expected for several days.    · Notify MD of bleeding more than 1 pad per hour, any moderate to severe pain unrelieved by pain medicine or for any signs of infection including fever above 100.4, yellow/green foul-smelling drainage, nausea or vomiting. Clinics number: [phone], or after business hours or emergency call [phone].    Thanks for choosing [facility]! Have a smooth recovery!

## 2022-06-10 VITALS
WEIGHT: 250.69 LBS | OXYGEN SATURATION: 98 % | HEART RATE: 82 BPM | BODY MASS INDEX: 48.95 KG/M2 | RESPIRATION RATE: 20 BRPM | SYSTOLIC BLOOD PRESSURE: 127 MMHG | DIASTOLIC BLOOD PRESSURE: 77 MMHG | TEMPERATURE: 97 F

## 2022-06-13 LAB
ESTROGEN SERPL-MCNC: NORMAL PG/ML
INSULIN SERPL-ACNC: NORMAL U[IU]/ML
LAB AP CLINICAL INFORMATION: NORMAL
LAB AP GROSS DESCRIPTION: NORMAL
LAB AP REPORT FOOTNOTES: NORMAL
T3RU NFR SERPL: NORMAL %

## 2022-06-16 ENCOUNTER — OFFICE VISIT (OUTPATIENT)
Dept: INTERNAL MEDICINE | Facility: CLINIC | Age: 52
End: 2022-06-16
Payer: COMMERCIAL

## 2022-06-16 VITALS
BODY MASS INDEX: 47.39 KG/M2 | OXYGEN SATURATION: 100 % | HEART RATE: 99 BPM | SYSTOLIC BLOOD PRESSURE: 126 MMHG | DIASTOLIC BLOOD PRESSURE: 82 MMHG | HEIGHT: 61 IN | TEMPERATURE: 99 F | WEIGHT: 251 LBS

## 2022-06-16 DIAGNOSIS — E66.01 OBESITY, CLASS III, BMI 40-49.9 (MORBID OBESITY): ICD-10-CM

## 2022-06-16 DIAGNOSIS — N93.9 ABNORMAL UTERINE BLEEDING (AUB): Primary | ICD-10-CM

## 2022-06-16 DIAGNOSIS — Z12.11 COLON CANCER SCREENING: ICD-10-CM

## 2022-06-16 DIAGNOSIS — Z00.00 WELL ADULT EXAM: ICD-10-CM

## 2022-06-16 DIAGNOSIS — D50.0 IRON DEFICIENCY ANEMIA DUE TO CHRONIC BLOOD LOSS: ICD-10-CM

## 2022-06-16 DIAGNOSIS — Z13.6 SCREENING FOR HYPERTENSION: ICD-10-CM

## 2022-06-16 DIAGNOSIS — Z12.39 ENCOUNTER FOR SCREENING FOR MALIGNANT NEOPLASM OF BREAST, UNSPECIFIED SCREENING MODALITY: ICD-10-CM

## 2022-06-16 PROBLEM — D64.9 ANEMIA: Status: RESOLVED | Noted: 2022-06-09 | Resolved: 2022-06-16

## 2022-06-16 PROCEDURE — 99204 OFFICE O/P NEW MOD 45 MIN: CPT | Mod: S$PBB,,, | Performed by: NURSE PRACTITIONER

## 2022-06-16 PROCEDURE — 99214 OFFICE O/P EST MOD 30 MIN: CPT | Mod: PBBFAC | Performed by: NURSE PRACTITIONER

## 2022-06-16 PROCEDURE — 99204 PR OFFICE/OUTPT VISIT, NEW, LEVL IV, 45-59 MIN: ICD-10-PCS | Mod: S$PBB,,, | Performed by: NURSE PRACTITIONER

## 2022-06-16 RX ORDER — IBUPROFEN 600 MG/1
600 TABLET ORAL EVERY 6 HOURS PRN
Qty: 60 TABLET | Refills: 2 | Status: SHIPPED | OUTPATIENT
Start: 2022-06-16 | End: 2022-06-30
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
## 2022-06-29 ENCOUNTER — CLINICAL SUPPORT (OUTPATIENT)
Dept: GYNECOLOGY | Facility: CLINIC | Age: 52
End: 2022-06-29
Payer: COMMERCIAL

## 2022-06-29 DIAGNOSIS — D64.9 ANEMIA, UNSPECIFIED TYPE: ICD-10-CM

## 2022-06-29 DIAGNOSIS — N93.9 ABNORMAL UTERINE BLEEDING (AUB): Primary | ICD-10-CM

## 2022-06-29 RX ORDER — FERROUS SULFATE 324(65)MG
324 TABLET, DELAYED RELEASE (ENTERIC COATED) ORAL 2 TIMES DAILY
Qty: 60 TABLET | Refills: 2 | Status: SHIPPED | OUTPATIENT
Start: 2022-06-29 | End: 2022-10-26 | Stop reason: SDUPTHER

## 2022-06-29 NOTE — PROGRESS NOTES
Established Patient - Audio Only Telehealth Visit     The patient location is: home  The chief complaint leading to consultation is: followup from hysteroscopy d&C   Visit type: Virtual visit with audio only (telephone)  Total time spent with patient: 15 minutes       The reason for the audio only service rather than synchronous audio and video virtual visit was related to technical difficulties or patient preference/necessity.     Each patient to whom I provide medical services by telemedicine is:  (1) informed of the relationship between the physician and patient and the respective role of any other health care provider with respect to management of the patient; and (2) notified that they may decline to receive medical services by telemedicine and may withdraw from such care at any time. Patient verbally consented to receive this service via voice-only telephone call.       HPI:  51-year-old  002 now status post hysteroscopy D&C on 2022 secondary to AUB-L.  Patient reports since hysteroscopy D&C patient has had occasional spotting.  Patient reports Saturday following hysteroscopy D&C patient did have a heavier bleed lasting about 8 days.  Patient denies any shortness of breath or dizziness.  Patient denies any abdominal pain.    Pathology results and operative findings were discussed with the patient.  Patient was offered definitive surgical management, I.e. hysterectomy, as well as option for hysteroscopic myomectomy but would have to be staged.  Patient reports she would proceed with hysterectomy.     Final Diagnosis      1. Endometrial curettings:  - Scant strips of endometrial epithelium and stroma, and fragments of benign endocervical tissue with predominantly blood.     2. Endometrial biopsy:  - Fragments of benign myometrium and endocervical tissue.           Assessment and plan:    1. AUB L-status post hysteroscopy D&C; stable.  Send case to preoperative gyn resident to schedule hysterectomy  for definitive treatment.   2. Anemia-prescription for iron oral therapy sent to patient's pharmacy.                     This service was not originating from a related E/M service provided within the previous 7 days nor will  to an E/M service or procedure within the next 24 hours or my soonest available appointment.  Prevailing standard of care was able to be met in this audio-only visit.

## 2022-07-02 ENCOUNTER — TELEPHONE (OUTPATIENT)
Dept: GYNECOLOGY | Facility: CLINIC | Age: 52
End: 2022-07-02
Payer: COMMERCIAL

## 2022-07-02 NOTE — TELEPHONE ENCOUNTER
I called Ms. Denise to discuss possible scheduling of hysterectomy. Patient does not wish to schedule at this time. Instructed patient to call clinic when she's interested in scheduling.    Plan for TLH/BS/Rox[place]o    Franny Chester MD, PGY-3  [facility]  07/02/2022 3:16 PM

## 2022-07-13 ENCOUNTER — PATIENT MESSAGE (OUTPATIENT)
Dept: INTERNAL MEDICINE | Facility: CLINIC | Age: 52
End: 2022-07-13
Payer: COMMERCIAL

## 2022-07-15 ENCOUNTER — LAB VISIT (OUTPATIENT)
Dept: LAB | Facility: HOSPITAL | Age: 52
End: 2022-07-15
Attending: NURSE PRACTITIONER
Payer: COMMERCIAL

## 2022-07-15 ENCOUNTER — TELEPHONE (OUTPATIENT)
Dept: INTERNAL MEDICINE | Facility: CLINIC | Age: 52
End: 2022-07-15

## 2022-07-15 DIAGNOSIS — Z13.6 SCREENING FOR HYPERTENSION: Primary | ICD-10-CM

## 2022-07-15 DIAGNOSIS — Z12.11 COLON CANCER SCREENING: ICD-10-CM

## 2022-07-15 LAB
APPEARANCE UR: ABNORMAL
BACTERIA #/AREA URNS AUTO: ABNORMAL /HPF
BILIRUB UR QL STRIP.AUTO: NEGATIVE MG/DL
COLOR UR AUTO: YELLOW
GLUCOSE UR QL STRIP.AUTO: NORMAL MG/DL
HYALINE CASTS #/AREA URNS LPF: ABNORMAL /LPF
KETONES UR QL STRIP.AUTO: NEGATIVE MG/DL
LEUKOCYTE ESTERASE UR QL STRIP.AUTO: 25 UNIT/L
MUCOUS THREADS URNS QL MICRO: ABNORMAL /LPF
NITRITE UR QL STRIP.AUTO: NEGATIVE
PH UR STRIP.AUTO: 5.5 [PH]
PROT UR QL STRIP.AUTO: NEGATIVE MG/DL
RBC #/AREA URNS AUTO: ABNORMAL /HPF
RBC UR QL AUTO: ABNORMAL UNIT/L
SP GR UR STRIP.AUTO: 1.02
SQUAMOUS #/AREA URNS LPF: ABNORMAL /HPF
UROBILINOGEN UR STRIP-ACNC: NORMAL MG/DL
WBC #/AREA URNS AUTO: ABNORMAL /HPF

## 2022-07-15 PROCEDURE — 82274 ASSAY TEST FOR BLOOD FECAL: CPT

## 2022-07-15 PROCEDURE — 81001 URINALYSIS AUTO W/SCOPE: CPT

## 2022-07-15 NOTE — TELEPHONE ENCOUNTER
Pt wants to know if she has to complete labs before her next visit which is on 07/25/2022. Pt also wants to know when will orders for her MMG be put in.

## 2022-07-17 LAB — MAYO GENERIC ORDERABLE RESULT: NORMAL

## 2022-07-25 ENCOUNTER — OFFICE VISIT (OUTPATIENT)
Dept: INTERNAL MEDICINE | Facility: CLINIC | Age: 52
End: 2022-07-25
Payer: COMMERCIAL

## 2022-07-25 VITALS
WEIGHT: 252 LBS | DIASTOLIC BLOOD PRESSURE: 78 MMHG | HEIGHT: 61 IN | RESPIRATION RATE: 18 BRPM | SYSTOLIC BLOOD PRESSURE: 128 MMHG | HEART RATE: 99 BPM | TEMPERATURE: 99 F | BODY MASS INDEX: 47.58 KG/M2

## 2022-07-25 DIAGNOSIS — Z00.00 WELL ADULT EXAM: ICD-10-CM

## 2022-07-25 DIAGNOSIS — Z13.6 SCREENING FOR HYPERTENSION: ICD-10-CM

## 2022-07-25 DIAGNOSIS — D50.0 IRON DEFICIENCY ANEMIA DUE TO CHRONIC BLOOD LOSS: Primary | ICD-10-CM

## 2022-07-25 DIAGNOSIS — Z12.11 COLON CANCER SCREENING: ICD-10-CM

## 2022-07-25 PROCEDURE — 99214 OFFICE O/P EST MOD 30 MIN: CPT | Mod: PBBFAC | Performed by: NURSE PRACTITIONER

## 2022-07-25 PROCEDURE — 99213 OFFICE O/P EST LOW 20 MIN: CPT | Mod: S$PBB,,, | Performed by: NURSE PRACTITIONER

## 2022-07-25 PROCEDURE — 99213 PR OFFICE/OUTPT VISIT, EST, LEVL III, 20-29 MIN: ICD-10-PCS | Mod: S$PBB,,, | Performed by: NURSE PRACTITIONER

## 2022-07-25 RX ORDER — MELOXICAM 15 MG/1
15 TABLET ORAL DAILY
COMMUNITY
Start: 2022-07-11 | End: 2022-10-26

## 2022-07-25 NOTE — PROGRESS NOTES
Internal Medicine Clinic  [facility]UANA Arred[facility]     Patient Name: Kely Sparks   : 1970  MRN:[id number]     Review of patient's allergies indicates:   Allergen Reactions    Shellfish containing products Hives and Itching      Medication List with Changes/Refills   Current Medications    FERROUS SULFATE 324 MG (65 MG IRON) TBEC    Take 1 tablet (324 mg total) by mouth 2 (two) times daily.    MEDROXYPROGESTERONE (PROVERA) 10 MG TABLET    Take 1 tablet (10 mg total) by mouth once daily.    MELOXICAM (MOBIC) 15 MG TABLET    Take 15 mg by mouth once daily.    METHOCARBAMOL (ROBAXIN) 750 MG TAB    Take 750 mg by mouth 2 (two) times daily.        CC:  Chief Complaint   Patient presents with    LAB RESULTS        HPI  53 y/o female for f/u lab results. Pt has history AUB-L, underwent hysteroscopy D&C and Mirena IUD placement 2014. Pt denies chronic medical conditions.           ROS  Review of Systems   Constitutional: Negative.    HENT: Negative.    Eyes: Negative.    Respiratory: Negative.    Cardiovascular: Negative.    Gastrointestinal: Negative.    Endocrine: Negative.    Genitourinary: Negative.    Musculoskeletal: Negative.    Integumentary:  Negative.   Allergic/Immunologic: Negative.    Neurological: Negative.    Hematological: Negative.    Psychiatric/Behavioral: Negative.         Physical Examination:  Vitals:    22 1310   BP: 128/78   Pulse: 99   Resp: 18   Temp: 98.7 °F (37.1 °C)          Physical Exam  Vitals reviewed.   Constitutional:       Appearance: Normal appearance. She is normal weight.   HENT:      Head: Normocephalic.   Cardiovascular:      Rate and Rhythm: Normal rate and regular rhythm.      Pulses: Normal pulses.      Heart sounds: Normal heart sounds.   Pulmonary:      Effort: Pulmonary effort is normal.      Breath sounds: Normal breath sounds.   Abdominal:      General: Abdomen is flat.      Palpations: Abdomen is soft.   Musculoskeletal:         General: Normal  range of motion.      Cervical back: Normal range of motion.   Skin:     General: Skin is warm and dry.   Neurological:      Mental Status: She is alert.   Psychiatric:         Mood and Affect: Mood normal.            Labs/Imaging:  Reviewed    Assessment/Plan:  1. Anemia  Improvement noted. Will repeat in 3 months.   Pt followed in GYN cl for AUB. Keep appts.     2. Well adult exam  Labs in 3 months. MMG ordered- appt pending. UTD with GYN exam. UTD FIT Neg results 7-17-22.     3. Colon cancer screening  UTD FIT Neg results 7-17-22.          RTC in 3 months with labs 1 week prior to appt.

## 2022-08-26 ENCOUNTER — HOSPITAL ENCOUNTER (OUTPATIENT)
Dept: RADIOLOGY | Facility: HOSPITAL | Age: 52
Discharge: HOME | End: 2022-08-26
Attending: STUDENT IN AN ORGANIZED HEALTH CARE EDUCATION/TRAINING PROGRAM
Payer: COMMERCIAL

## 2022-08-26 DIAGNOSIS — Z12.31 ENCOUNTER FOR SCREENING MAMMOGRAM FOR MALIGNANT NEOPLASM OF BREAST: ICD-10-CM

## 2022-09-05 PROBLEM — Z00.00 HEALTH CARE MAINTENANCE: Status: RESOLVED | Noted: 2022-06-01 | Resolved: 2022-09-05

## 2022-09-14 ENCOUNTER — HOSPITAL ENCOUNTER (OUTPATIENT)
Dept: RADIOLOGY | Facility: HOSPITAL | Age: 52
Discharge: HOME | End: 2022-09-14
Attending: STUDENT IN AN ORGANIZED HEALTH CARE EDUCATION/TRAINING PROGRAM
Payer: COMMERCIAL

## 2022-09-14 DIAGNOSIS — Z12.31 ENCOUNTER FOR SCREENING MAMMOGRAM FOR MALIGNANT NEOPLASM OF BREAST: ICD-10-CM

## 2022-09-14 PROCEDURE — 76642 ULTRASOUND BREAST LIMITED: CPT | Mod: 26,RT,, | Performed by: RADIOLOGY

## 2022-09-14 PROCEDURE — 76642 ULTRASOUND BREAST LIMITED: CPT | Mod: TC,RT

## 2022-09-14 PROCEDURE — 77062 MAMMO DIGITAL DIAGNOSTIC BILAT WITH TOMO: ICD-10-PCS | Mod: 26,,, | Performed by: RADIOLOGY

## 2022-09-14 PROCEDURE — 77062 BREAST TOMOSYNTHESIS BI: CPT | Mod: 26,,, | Performed by: RADIOLOGY

## 2022-09-14 PROCEDURE — 77066 DX MAMMO INCL CAD BI: CPT | Mod: 26,,, | Performed by: RADIOLOGY

## 2022-09-14 PROCEDURE — 77062 BREAST TOMOSYNTHESIS BI: CPT | Mod: TC

## 2022-09-14 PROCEDURE — 77066 MAMMO DIGITAL DIAGNOSTIC BILAT WITH TOMO: ICD-10-PCS | Mod: 26,,, | Performed by: RADIOLOGY

## 2022-09-14 PROCEDURE — 76642 US BREAST RIGHT LIMITED: ICD-10-PCS | Mod: 26,RT,, | Performed by: RADIOLOGY

## 2022-09-19 PROBLEM — Z00.00 WELL ADULT EXAM: Status: RESOLVED | Noted: 2022-06-16 | Resolved: 2022-09-19

## 2022-10-01 ENCOUNTER — TELEPHONE (OUTPATIENT)
Dept: GYNECOLOGY | Facility: CLINIC | Age: 52
End: 2022-10-01

## 2022-10-01 NOTE — TELEPHONE ENCOUNTER
Called pt to discuss scheduling hysterectomy, pt states she is still not ready and will call clinic when she wants to schedule.     Va Roche MD, MPH  [facility], PGY3

## 2022-10-19 ENCOUNTER — LAB VISIT (OUTPATIENT)
Dept: LAB | Facility: HOSPITAL | Age: 52
End: 2022-10-19
Attending: NURSE PRACTITIONER
Payer: COMMERCIAL

## 2022-10-19 DIAGNOSIS — Z13.6 SCREENING FOR HYPERTENSION: ICD-10-CM

## 2022-10-19 DIAGNOSIS — D50.0 IRON DEFICIENCY ANEMIA DUE TO CHRONIC BLOOD LOSS: ICD-10-CM

## 2022-10-19 LAB
ANION GAP SERPL CALC-SCNC: 10 MEQ/L
APPEARANCE UR: ABNORMAL
BACTERIA #/AREA URNS AUTO: ABNORMAL /HPF
BASOPHILS # BLD AUTO: 0.05 X10(3)/MCL (ref 0–0.2)
BASOPHILS NFR BLD AUTO: 0.7 %
BILIRUB UR QL STRIP.AUTO: NEGATIVE MG/DL
BUN SERPL-MCNC: 10.9 MG/DL (ref 9.8–20.1)
CALCIUM SERPL-MCNC: 9.1 MG/DL (ref 8.4–10.2)
CHLORIDE SERPL-SCNC: 107 MMOL/L (ref 98–107)
CHOLEST SERPL-MCNC: 139 MG/DL
CHOLEST/HDLC SERPL: 3 {RATIO} (ref 0–5)
CO2 SERPL-SCNC: 25 MMOL/L (ref 22–29)
COLOR UR AUTO: ABNORMAL
CREAT SERPL-MCNC: 0.71 MG/DL (ref 0.55–1.02)
CREAT/UREA NIT SERPL: 15
EOSINOPHIL # BLD AUTO: 0.32 X10(3)/MCL (ref 0–0.9)
EOSINOPHIL NFR BLD AUTO: 4.4 %
ERYTHROCYTE [DISTWIDTH] IN BLOOD BY AUTOMATED COUNT: 20.8 % (ref 11.5–17)
EST. AVERAGE GLUCOSE BLD GHB EST-MCNC: 102.5 MG/DL
GFR SERPLBLD CREATININE-BSD FMLA CKD-EPI: >60 MLS/MIN/1.73/M2
GLUCOSE SERPL-MCNC: 103 MG/DL (ref 74–100)
GLUCOSE UR QL STRIP.AUTO: NORMAL MG/DL
HBA1C MFR BLD: 5.2 %
HCT VFR BLD AUTO: 34 % (ref 37–47)
HDLC SERPL-MCNC: 40 MG/DL (ref 35–60)
HGB BLD-MCNC: 9.3 GM/DL (ref 12–16)
HYALINE CASTS #/AREA URNS LPF: ABNORMAL /LPF
IMM GRANULOCYTES # BLD AUTO: 0.01 X10(3)/MCL (ref 0–0.04)
IMM GRANULOCYTES NFR BLD AUTO: 0.1 %
KETONES UR QL STRIP.AUTO: NEGATIVE MG/DL
LDLC SERPL CALC-MCNC: 93 MG/DL (ref 50–140)
LEUKOCYTE ESTERASE UR QL STRIP.AUTO: 250 UNIT/L
LYMPHOCYTES # BLD AUTO: 1.67 X10(3)/MCL (ref 0.6–4.6)
LYMPHOCYTES NFR BLD AUTO: 22.9 %
MCH RBC QN AUTO: 19.8 PG (ref 27–31)
MCHC RBC AUTO-ENTMCNC: 27.4 MG/DL (ref 33–36)
MCV RBC AUTO: 72.3 FL (ref 80–94)
MONOCYTES # BLD AUTO: 0.56 X10(3)/MCL (ref 0.1–1.3)
MONOCYTES NFR BLD AUTO: 7.7 %
MUCOUS THREADS URNS QL MICRO: ABNORMAL /LPF
NEUTROPHILS # BLD AUTO: 4.7 X10(3)/MCL (ref 2.1–9.2)
NEUTROPHILS NFR BLD AUTO: 64.2 %
NITRITE UR QL STRIP.AUTO: NEGATIVE
NRBC BLD AUTO-RTO: 0 %
PH UR STRIP.AUTO: 6 [PH]
PLATELET # BLD AUTO: 332 X10(3)/MCL (ref 130–400)
PMV BLD AUTO: 11.4 FL (ref 7.4–10.4)
POTASSIUM SERPL-SCNC: 5 MMOL/L (ref 3.5–5.1)
PROT UR QL STRIP.AUTO: NEGATIVE MG/DL
RBC # BLD AUTO: 4.7 X10(6)/MCL (ref 4.2–5.4)
RBC #/AREA URNS AUTO: ABNORMAL /HPF
RBC UR QL AUTO: ABNORMAL UNIT/L
SODIUM SERPL-SCNC: 142 MMOL/L (ref 136–145)
SP GR UR STRIP.AUTO: 1.03
SQUAMOUS #/AREA URNS LPF: ABNORMAL /HPF
TRIGL SERPL-MCNC: 29 MG/DL (ref 37–140)
UROBILINOGEN UR STRIP-ACNC: NORMAL MG/DL
VLDLC SERPL CALC-MCNC: 6 MG/DL
WBC # SPEC AUTO: 7.3 X10(3)/MCL (ref 4.5–11.5)
WBC #/AREA URNS AUTO: ABNORMAL /HPF

## 2022-10-19 PROCEDURE — 80061 LIPID PANEL: CPT

## 2022-10-19 PROCEDURE — 83036 HEMOGLOBIN GLYCOSYLATED A1C: CPT

## 2022-10-19 PROCEDURE — 87088 URINE BACTERIA CULTURE: CPT

## 2022-10-19 PROCEDURE — 80048 BASIC METABOLIC PNL TOTAL CA: CPT

## 2022-10-19 PROCEDURE — 81001 URINALYSIS AUTO W/SCOPE: CPT

## 2022-10-19 PROCEDURE — 36415 COLL VENOUS BLD VENIPUNCTURE: CPT

## 2022-10-19 PROCEDURE — 85025 COMPLETE CBC W/AUTO DIFF WBC: CPT

## 2022-10-21 LAB — BACTERIA UR CULT: ABNORMAL

## 2022-10-26 ENCOUNTER — OFFICE VISIT (OUTPATIENT)
Dept: INTERNAL MEDICINE | Facility: CLINIC | Age: 52
End: 2022-10-26
Payer: COMMERCIAL

## 2022-10-26 ENCOUNTER — TELEPHONE (OUTPATIENT)
Dept: GYNECOLOGY | Facility: CLINIC | Age: 52
End: 2022-10-26

## 2022-10-26 VITALS
DIASTOLIC BLOOD PRESSURE: 79 MMHG | HEART RATE: 88 BPM | SYSTOLIC BLOOD PRESSURE: 125 MMHG | RESPIRATION RATE: 18 BRPM | TEMPERATURE: 98 F | BODY MASS INDEX: 47.2 KG/M2 | HEIGHT: 61 IN | WEIGHT: 250 LBS

## 2022-10-26 DIAGNOSIS — N39.0 GROUP B STREPTOCOCCAL UTI: ICD-10-CM

## 2022-10-26 DIAGNOSIS — E66.01 OBESITY, CLASS III, BMI 40-49.9 (MORBID OBESITY): ICD-10-CM

## 2022-10-26 DIAGNOSIS — B95.1 GROUP B STREPTOCOCCAL UTI: ICD-10-CM

## 2022-10-26 DIAGNOSIS — Z00.00 WELL ADULT EXAM: ICD-10-CM

## 2022-10-26 DIAGNOSIS — D50.0 IRON DEFICIENCY ANEMIA DUE TO CHRONIC BLOOD LOSS: ICD-10-CM

## 2022-10-26 DIAGNOSIS — N93.9 ABNORMAL UTERINE BLEEDING (AUB): Primary | ICD-10-CM

## 2022-10-26 PROCEDURE — 99214 OFFICE O/P EST MOD 30 MIN: CPT | Mod: S$PBB,,, | Performed by: NURSE PRACTITIONER

## 2022-10-26 PROCEDURE — 99214 OFFICE O/P EST MOD 30 MIN: CPT | Mod: PBBFAC | Performed by: NURSE PRACTITIONER

## 2022-10-26 PROCEDURE — 99214 PR OFFICE/OUTPT VISIT, EST, LEVL IV, 30-39 MIN: ICD-10-PCS | Mod: S$PBB,,, | Performed by: NURSE PRACTITIONER

## 2022-10-26 RX ORDER — AMOXICILLIN AND CLAVULANATE POTASSIUM 875; 125 MG/1; MG/1
1 TABLET, FILM COATED ORAL EVERY 12 HOURS
Qty: 14 TABLET | Refills: 0 | Status: SHIPPED | OUTPATIENT
Start: 2022-10-26 | End: 2022-11-02

## 2022-10-26 RX ORDER — FERROUS SULFATE 324(65)MG
324 TABLET, DELAYED RELEASE (ENTERIC COATED) ORAL 2 TIMES DAILY
Qty: 60 TABLET | Refills: 6 | OUTPATIENT
Start: 2022-10-26 | End: 2023-01-21

## 2022-10-26 RX ORDER — MEDROXYPROGESTERONE ACETATE 10 MG/1
10 TABLET ORAL DAILY
Qty: 30 TABLET | Refills: 11 | OUTPATIENT
Start: 2022-10-26 | End: 2023-01-21

## 2022-10-26 NOTE — PROGRESS NOTES
"Internal Medicine Clinic  [facility]UA[facility] Arred[facility]     Patient Name: Kely Sparks   : 1970  MRN:[id number]     Review of patient's allergies indicates:   Allergen Reactions    Shellfish containing products Hives and Itching      Medication List with Changes/Refills   Current Medications    FERROUS SULFATE 324 MG (65 MG IRON) TBEC    Take 1 tablet (324 mg total) by mouth 2 (two) times daily.    MEDROXYPROGESTERONE (PROVERA) 10 MG TABLET    Take 1 tablet (10 mg total) by mouth once daily.    MELOXICAM (MOBIC) 15 MG TABLET    Take 15 mg by mouth once daily.    METHOCARBAMOL (ROBAXIN) 750 MG TAB    Take 750 mg by mouth 2 (two) times daily.        CC:  Chief Complaint   Patient presents with    lab results        HPI  52 y/o female with history AUB-L, underwent hysteroscopy D&C and Mirena IUD placement 2014. Pt denies chronic medical conditions. Note in chart in GYN cl that they called to schedule her Hyst- however pt not ready and will call when she is ready to schedule. Pt states she ate at a restaurant "meat ball stew" and she developed upper lip swelling that resolved on its own. Denies food allergies besides shell fish. Pt will monitor for re-occurrence. ER precautions encouraged.           ROS  Review of Systems   Constitutional: Negative.    HENT: Negative.     Eyes: Negative.    Respiratory: Negative.     Cardiovascular: Negative.    Gastrointestinal: Negative.    Endocrine: Negative.    Genitourinary: Negative.    Musculoskeletal: Negative.    Integumentary:  Negative.   Allergic/Immunologic: Negative.    Neurological: Negative.    Hematological: Negative.    Psychiatric/Behavioral: Negative.        Physical Examination:  Vitals:    10/26/22 0850   BP: 125/79   Pulse: 88   Resp: 18   Temp: 98.4 °F (36.9 °C)          Physical Exam  Vitals reviewed.   Constitutional:       Appearance: Normal appearance. She is normal weight.   HENT:      Head: Normocephalic.   Cardiovascular:      Rate and " Rhythm: Normal rate and regular rhythm.      Pulses: Normal pulses.      Heart sounds: Normal heart sounds.   Pulmonary:      Effort: Pulmonary effort is normal.      Breath sounds: Normal breath sounds.   Abdominal:      General: Abdomen is flat.      Palpations: Abdomen is soft.   Musculoskeletal:         General: Normal range of motion.      Cervical back: Normal range of motion.   Skin:     General: Skin is warm and dry.   Neurological:      Mental Status: She is alert.   Psychiatric:         Mood and Affect: Mood normal.          Labs/Imaging:  Reviewed    Assessment/Plan:  1. Abnormal uterine bleeding (AUB)  Pt followed in GYN cl. Keep appt.     2. Anemia  CBC in 3 months.     3. Obesity, Class III, BMI 40-49.9 (morbid obesity)  Low fat diet and exercise. Education provided.     4. Well adult exam   Labs in 3 months. UTD MMG. UTD GYN exam. FIT test negative 7-15-22.     5. UTI  Urine test results show bacteria, RBC, WBC. Urine culture results show Group B Strep. Will RX Augmentin 875 mg 1 tab po BID X 7 days. Drink plenty of water. Will repeat UA in 3 months.     RTC in 3 months with labs 1 week prior to appt.

## 2022-10-26 NOTE — TELEPHONE ENCOUNTER
----- Message from Sonia Glaser sent at 10/26/2022  9:34 AM CDT -----  Regarding: RX refill  Above pt needs another RX for her provera to be sent to [facility] [place]. Please Advise Thanks

## 2023-01-18 ENCOUNTER — LAB VISIT (OUTPATIENT)
Dept: LAB | Facility: HOSPITAL | Age: 53
End: 2023-01-18
Payer: COMMERCIAL

## 2023-01-18 DIAGNOSIS — R82.90 ABNORMAL URINE: ICD-10-CM

## 2023-01-18 DIAGNOSIS — D64.9 ANEMIA, UNSPECIFIED TYPE: Primary | ICD-10-CM

## 2023-01-18 DIAGNOSIS — Z13.6 SCREENING FOR HYPERTENSION: ICD-10-CM

## 2023-01-18 DIAGNOSIS — D64.9 ANEMIA, UNSPECIFIED TYPE: ICD-10-CM

## 2023-01-18 LAB
ALBUMIN SERPL-MCNC: 3.6 G/DL (ref 3.5–5)
ALBUMIN/GLOB SERPL: 0.9 RATIO (ref 1.1–2)
ALP SERPL-CCNC: 55 UNIT/L (ref 40–150)
ALT SERPL-CCNC: 12 UNIT/L (ref 0–55)
AST SERPL-CCNC: 15 UNIT/L (ref 5–34)
BASOPHILS # BLD AUTO: 0.06 X10(3)/MCL (ref 0–0.2)
BASOPHILS NFR BLD AUTO: 0.9 %
BILIRUBIN DIRECT+TOT PNL SERPL-MCNC: 0.4 MG/DL
BUN SERPL-MCNC: 10.4 MG/DL (ref 9.8–20.1)
CALCIUM SERPL-MCNC: 9.1 MG/DL (ref 8.4–10.2)
CHLORIDE SERPL-SCNC: 109 MMOL/L (ref 98–107)
CO2 SERPL-SCNC: 24 MMOL/L (ref 22–29)
CREAT SERPL-MCNC: 0.71 MG/DL (ref 0.55–1.02)
EOSINOPHIL # BLD AUTO: 0.2 X10(3)/MCL (ref 0–0.9)
EOSINOPHIL NFR BLD AUTO: 2.8 %
ERYTHROCYTE [DISTWIDTH] IN BLOOD BY AUTOMATED COUNT: 17.9 % (ref 11.5–17)
GFR SERPLBLD CREATININE-BSD FMLA CKD-EPI: >60 MLS/MIN/1.73/M2
GLOBULIN SER-MCNC: 4 GM/DL (ref 2.4–3.5)
GLUCOSE SERPL-MCNC: 94 MG/DL (ref 74–100)
HCT VFR BLD AUTO: 23.8 % (ref 37–47)
HGB BLD-MCNC: 6.4 GM/DL (ref 12–16)
IMM GRANULOCYTES # BLD AUTO: 0.02 X10(3)/MCL (ref 0–0.04)
IMM GRANULOCYTES NFR BLD AUTO: 0.3 %
LYMPHOCYTES # BLD AUTO: 1.43 X10(3)/MCL (ref 0.6–4.6)
LYMPHOCYTES NFR BLD AUTO: 20.3 %
MCH RBC QN AUTO: 19.2 PG
MCHC RBC AUTO-ENTMCNC: 26.9 MG/DL (ref 33–36)
MCV RBC AUTO: 71.3 FL (ref 80–94)
MONOCYTES # BLD AUTO: 0.59 X10(3)/MCL (ref 0.1–1.3)
MONOCYTES NFR BLD AUTO: 8.4 %
NEUTROPHILS # BLD AUTO: 4.73 X10(3)/MCL (ref 2.1–9.2)
NEUTROPHILS NFR BLD AUTO: 67.3 %
NRBC BLD AUTO-RTO: 0 %
PLATELET # BLD AUTO: 296 X10(3)/MCL (ref 130–400)
PMV BLD AUTO: 12.3 FL (ref 7.4–10.4)
POTASSIUM SERPL-SCNC: 4 MMOL/L (ref 3.5–5.1)
PROT SERPL-MCNC: 7.6 GM/DL (ref 6.4–8.3)
RBC # BLD AUTO: 3.34 X10(6)/MCL (ref 4.2–5.4)
SODIUM SERPL-SCNC: 141 MMOL/L (ref 136–145)
WBC # SPEC AUTO: 7 X10(3)/MCL (ref 4.5–11.5)

## 2023-01-18 PROCEDURE — 81001 URINALYSIS AUTO W/SCOPE: CPT

## 2023-01-18 PROCEDURE — 80053 COMPREHEN METABOLIC PANEL: CPT

## 2023-01-18 PROCEDURE — 85025 COMPLETE CBC W/AUTO DIFF WBC: CPT

## 2023-01-18 PROCEDURE — 36415 COLL VENOUS BLD VENIPUNCTURE: CPT

## 2023-01-19 LAB
APPEARANCE UR: CLEAR
BACTERIA #/AREA URNS AUTO: ABNORMAL /HPF
BILIRUB UR QL STRIP.AUTO: NEGATIVE MG/DL
COLOR UR AUTO: ABNORMAL
GLUCOSE UR QL STRIP.AUTO: NORMAL MG/DL
HYALINE CASTS #/AREA URNS LPF: ABNORMAL /LPF
KETONES UR QL STRIP.AUTO: NEGATIVE MG/DL
LEUKOCYTE ESTERASE UR QL STRIP.AUTO: 25 UNIT/L
MUCOUS THREADS URNS QL MICRO: ABNORMAL /LPF
NITRITE UR QL STRIP.AUTO: NEGATIVE
PH UR STRIP.AUTO: 6 [PH]
PROT UR QL STRIP.AUTO: NEGATIVE MG/DL
RBC #/AREA URNS AUTO: ABNORMAL /HPF
RBC UR QL AUTO: NEGATIVE UNIT/L
SP GR UR STRIP.AUTO: 1.02
SQUAMOUS #/AREA URNS LPF: ABNORMAL /HPF
UROBILINOGEN UR STRIP-ACNC: NORMAL MG/DL
WBC #/AREA URNS AUTO: ABNORMAL /HPF

## 2023-01-20 ENCOUNTER — HOSPITAL ENCOUNTER (OUTPATIENT)
Facility: HOSPITAL | Age: 53
Discharge: HOME | End: 2023-01-21
Attending: FAMILY MEDICINE | Admitting: INTERNAL MEDICINE
Payer: COMMERCIAL

## 2023-01-20 ENCOUNTER — TELEPHONE (OUTPATIENT)
Dept: INTERNAL MEDICINE | Facility: CLINIC | Age: 53
End: 2023-01-20
Payer: COMMERCIAL

## 2023-01-20 DIAGNOSIS — D50.0 IRON DEFICIENCY ANEMIA DUE TO CHRONIC BLOOD LOSS: ICD-10-CM

## 2023-01-20 DIAGNOSIS — D64.9 SEVERE ANEMIA: Primary | ICD-10-CM

## 2023-01-20 LAB
ABO + RH BLD: NORMAL
ABO + RH BLD: NORMAL
ALBUMIN SERPL-MCNC: 3.6 G/DL (ref 3.5–5)
ALBUMIN/GLOB SERPL: 0.9 RATIO (ref 1.1–2)
ALP SERPL-CCNC: 65 UNIT/L (ref 40–150)
ALT SERPL-CCNC: 11 UNIT/L (ref 0–55)
AST SERPL-CCNC: 17 UNIT/L (ref 5–34)
BASOPHILS # BLD AUTO: 0.07 X10(3)/MCL (ref 0–0.2)
BASOPHILS NFR BLD AUTO: 0.7 %
BILIRUBIN DIRECT+TOT PNL SERPL-MCNC: 0.3 MG/DL
BLD PROD TYP BPU: NORMAL
BLD PROD TYP BPU: NORMAL
BLOOD UNIT EXPIRATION DATE: NORMAL
BLOOD UNIT EXPIRATION DATE: NORMAL
BLOOD UNIT TYPE CODE: 5100
BLOOD UNIT TYPE CODE: 9500
BUN SERPL-MCNC: 10.3 MG/DL (ref 9.8–20.1)
CALCIUM SERPL-MCNC: 9.3 MG/DL (ref 8.4–10.2)
CHLORIDE SERPL-SCNC: 108 MMOL/L (ref 98–107)
CO2 SERPL-SCNC: 24 MMOL/L (ref 22–29)
CREAT SERPL-MCNC: 0.68 MG/DL (ref 0.55–1.02)
CROSSMATCH INTERPRETATION: NORMAL
CROSSMATCH INTERPRETATION: NORMAL
DISPENSE STATUS: NORMAL
DISPENSE STATUS: NORMAL
EOSINOPHIL # BLD AUTO: 0.26 X10(3)/MCL (ref 0–0.9)
EOSINOPHIL NFR BLD AUTO: 2.8 %
ERYTHROCYTE [DISTWIDTH] IN BLOOD BY AUTOMATED COUNT: 18 % (ref 11.5–17)
GFR SERPLBLD CREATININE-BSD FMLA CKD-EPI: >60 MLS/MIN/1.73/M2
GLOBULIN SER-MCNC: 4.1 GM/DL (ref 2.4–3.5)
GLUCOSE SERPL-MCNC: 94 MG/DL (ref 74–100)
GROUP & RH: NORMAL
HCT VFR BLD AUTO: 25.5 % (ref 37–47)
HGB BLD-MCNC: 6.8 GM/DL (ref 12–16)
IMM GRANULOCYTES # BLD AUTO: 0.02 X10(3)/MCL (ref 0–0.04)
IMM GRANULOCYTES NFR BLD AUTO: 0.2 %
INDIRECT COOMBS GEL: NORMAL
LYMPHOCYTES # BLD AUTO: 2.14 X10(3)/MCL (ref 0.6–4.6)
LYMPHOCYTES NFR BLD AUTO: 22.9 %
MCH RBC QN AUTO: 19.2 PG
MCHC RBC AUTO-ENTMCNC: 26.7 MG/DL (ref 33–36)
MCV RBC AUTO: 72 FL (ref 80–94)
MONOCYTES # BLD AUTO: 0.77 X10(3)/MCL (ref 0.1–1.3)
MONOCYTES NFR BLD AUTO: 8.2 %
NEUTROPHILS # BLD AUTO: 6.08 X10(3)/MCL (ref 2.1–9.2)
NEUTROPHILS NFR BLD AUTO: 65.2 %
NRBC BLD AUTO-RTO: 0 %
PLATELET # BLD AUTO: 317 X10(3)/MCL (ref 130–400)
PMV BLD AUTO: 12.5 FL (ref 7.4–10.4)
POTASSIUM SERPL-SCNC: 4.2 MMOL/L (ref 3.5–5.1)
PROT SERPL-MCNC: 7.7 GM/DL (ref 6.4–8.3)
RBC # BLD AUTO: 3.54 X10(6)/MCL (ref 4.2–5.4)
SODIUM SERPL-SCNC: 140 MMOL/L (ref 136–145)
UNIT NUMBER: NORMAL
UNIT NUMBER: NORMAL
WBC # SPEC AUTO: 9.3 X10(3)/MCL (ref 4.5–11.5)

## 2023-01-20 PROCEDURE — 36430 TRANSFUSION BLD/BLD COMPNT: CPT

## 2023-01-20 PROCEDURE — 80053 COMPREHEN METABOLIC PANEL: CPT | Performed by: PHYSICIAN ASSISTANT

## 2023-01-20 PROCEDURE — 86920 COMPATIBILITY TEST SPIN: CPT | Performed by: PHYSICIAN ASSISTANT

## 2023-01-20 PROCEDURE — 86920 COMPATIBILITY TEST SPIN: CPT | Performed by: FAMILY MEDICINE

## 2023-01-20 PROCEDURE — 99285 EMERGENCY DEPT VISIT HI MDM: CPT | Mod: 25

## 2023-01-20 PROCEDURE — 86900 BLOOD TYPING SEROLOGIC ABO: CPT | Performed by: PHYSICIAN ASSISTANT

## 2023-01-20 PROCEDURE — 82728 ASSAY OF FERRITIN: CPT

## 2023-01-20 PROCEDURE — 83550 IRON BINDING TEST: CPT

## 2023-01-20 PROCEDURE — 85025 COMPLETE CBC W/AUTO DIFF WBC: CPT | Performed by: PHYSICIAN ASSISTANT

## 2023-01-20 PROCEDURE — P9016 RBC LEUKOCYTES REDUCED: HCPCS | Performed by: PHYSICIAN ASSISTANT

## 2023-01-20 PROCEDURE — 86920 COMPATIBILITY TEST SPIN: CPT

## 2023-01-20 RX ORDER — HYDROCODONE BITARTRATE AND ACETAMINOPHEN 500; 5 MG/1; MG/1
TABLET ORAL
Status: DISCONTINUED | OUTPATIENT
Start: 2023-01-20 | End: 2023-01-21 | Stop reason: HOSPADM

## 2023-01-20 NOTE — TELEPHONE ENCOUNTER
Call pt and inform her CBC blood test shows her Hemoglobin level is 6.4, and Hematocrit is 23.8. Call pt and inform to go to ER for eval and mgmt due to hemoglobin being less than 8.

## 2023-01-20 NOTE — TELEPHONE ENCOUNTER
Informed patient her hemoglobin level was 6.4 and hematocrit is 23.8. Patient informed to come to ER for evaluation and management. Patient verbalized understanding.

## 2023-01-21 VITALS
DIASTOLIC BLOOD PRESSURE: 96 MMHG | HEART RATE: 67 BPM | WEIGHT: 265 LBS | BODY MASS INDEX: 52.03 KG/M2 | HEIGHT: 60 IN | OXYGEN SATURATION: 100 % | TEMPERATURE: 98 F | RESPIRATION RATE: 20 BRPM | SYSTOLIC BLOOD PRESSURE: 144 MMHG

## 2023-01-21 LAB
ABO + RH BLD: NORMAL
ABO + RH BLD: NORMAL
BASOPHILS # BLD AUTO: 0.06 X10(3)/MCL (ref 0–0.2)
BASOPHILS # BLD AUTO: 0.06 X10(3)/MCL (ref 0–0.2)
BASOPHILS NFR BLD AUTO: 0.7 %
BASOPHILS NFR BLD AUTO: 0.8 %
BLD PROD TYP BPU: NORMAL
BLD PROD TYP BPU: NORMAL
BLOOD UNIT EXPIRATION DATE: NORMAL
BLOOD UNIT EXPIRATION DATE: NORMAL
BLOOD UNIT TYPE CODE: 5100
BLOOD UNIT TYPE CODE: 5100
CROSSMATCH INTERPRETATION: NORMAL
CROSSMATCH INTERPRETATION: NORMAL
DEPRECATED CALCIDIOL+CALCIFEROL SERPL-MC: 24.4 NG/ML (ref 30–80)
DISPENSE STATUS: NORMAL
DISPENSE STATUS: NORMAL
EOSINOPHIL # BLD AUTO: 0.19 X10(3)/MCL (ref 0–0.9)
EOSINOPHIL # BLD AUTO: 0.29 X10(3)/MCL (ref 0–0.9)
EOSINOPHIL NFR BLD AUTO: 2.5 %
EOSINOPHIL NFR BLD AUTO: 3.3 %
ERYTHROCYTE [DISTWIDTH] IN BLOOD BY AUTOMATED COUNT: 18.8 % (ref 11.5–17)
ERYTHROCYTE [DISTWIDTH] IN BLOOD BY AUTOMATED COUNT: 19.1 % (ref 11.5–17)
FERRITIN SERPL-MCNC: 5.29 NG/ML (ref 4.63–204)
FOLATE SERPL-MCNC: 19.1 NG/ML (ref 7–31.4)
HAPTOGLOB SERPL-MCNC: 167 MG/DL (ref 35–250)
HCT VFR BLD AUTO: 26.7 % (ref 37–47)
HCT VFR BLD AUTO: 27.5 % (ref 37–47)
HGB BLD-MCNC: 7.7 GM/DL (ref 12–16)
HGB BLD-MCNC: 7.9 GM/DL (ref 12–16)
IMM GRANULOCYTES # BLD AUTO: 0.02 X10(3)/MCL (ref 0–0.04)
IMM GRANULOCYTES # BLD AUTO: 0.02 X10(3)/MCL (ref 0–0.04)
IMM GRANULOCYTES NFR BLD AUTO: 0.2 %
IMM GRANULOCYTES NFR BLD AUTO: 0.3 %
IRON SATN MFR SERPL: 5 % (ref 20–50)
IRON SERPL-MCNC: 16 UG/DL (ref 50–170)
LYMPHOCYTES # BLD AUTO: 1.73 X10(3)/MCL (ref 0.6–4.6)
LYMPHOCYTES # BLD AUTO: 2.03 X10(3)/MCL (ref 0.6–4.6)
LYMPHOCYTES NFR BLD AUTO: 22.9 %
LYMPHOCYTES NFR BLD AUTO: 23.2 %
MCH RBC QN AUTO: 21 PG
MCH RBC QN AUTO: 21.1 PG
MCHC RBC AUTO-ENTMCNC: 28.7 MG/DL (ref 33–36)
MCHC RBC AUTO-ENTMCNC: 28.8 MG/DL (ref 33–36)
MCV RBC AUTO: 73.1 FL (ref 80–94)
MCV RBC AUTO: 73.2 FL (ref 80–94)
MONOCYTES # BLD AUTO: 0.61 X10(3)/MCL (ref 0.1–1.3)
MONOCYTES # BLD AUTO: 0.85 X10(3)/MCL (ref 0.1–1.3)
MONOCYTES NFR BLD AUTO: 8.2 %
MONOCYTES NFR BLD AUTO: 9.6 %
NEUTROPHILS # BLD AUTO: 4.86 X10(3)/MCL (ref 2.1–9.2)
NEUTROPHILS # BLD AUTO: 5.61 X10(3)/MCL (ref 2.1–9.2)
NEUTROPHILS NFR BLD AUTO: 63.3 %
NEUTROPHILS NFR BLD AUTO: 65 %
NRBC BLD AUTO-RTO: 0 %
NRBC BLD AUTO-RTO: 0 %
PLATELET # BLD AUTO: 264 X10(3)/MCL (ref 130–400)
PLATELET # BLD AUTO: 284 X10(3)/MCL (ref 130–400)
PMV BLD AUTO: 10.5 FL (ref 7.4–10.4)
PMV BLD AUTO: 10.9 FL (ref 7.4–10.4)
RBC # BLD AUTO: 3.65 X10(6)/MCL (ref 4.2–5.4)
RBC # BLD AUTO: 3.76 X10(6)/MCL (ref 4.2–5.4)
RET# (OHS): 0.04 (ref 0.02–0.08)
RETICULOCYTE COUNT AUTOMATED (OLG): 1 % (ref 1.1–2.1)
TIBC SERPL-MCNC: 339 UG/DL (ref 70–310)
TIBC SERPL-MCNC: 355 UG/DL (ref 250–450)
TRANSFERRIN SERPL-MCNC: 319 MG/DL (ref 180–382)
UNIT NUMBER: NORMAL
UNIT NUMBER: NORMAL
VIT B12 SERPL-MCNC: 569 PG/ML (ref 213–816)
WBC # SPEC AUTO: 7.5 X10(3)/MCL (ref 4.5–11.5)
WBC # SPEC AUTO: 8.9 X10(3)/MCL (ref 4.5–11.5)

## 2023-01-21 PROCEDURE — 85045 AUTOMATED RETICULOCYTE COUNT: CPT

## 2023-01-21 PROCEDURE — 85610 PROTHROMBIN TIME: CPT | Performed by: FAMILY MEDICINE

## 2023-01-21 PROCEDURE — 82306 VITAMIN D 25 HYDROXY: CPT

## 2023-01-21 PROCEDURE — 63600175 PHARM REV CODE 636 W HCPCS: Mod: JG

## 2023-01-21 PROCEDURE — P9016 RBC LEUKOCYTES REDUCED: HCPCS

## 2023-01-21 PROCEDURE — G0378 HOSPITAL OBSERVATION PER HR: HCPCS

## 2023-01-21 PROCEDURE — 85025 COMPLETE CBC W/AUTO DIFF WBC: CPT

## 2023-01-21 PROCEDURE — 85025 COMPLETE CBC W/AUTO DIFF WBC: CPT | Performed by: FAMILY MEDICINE

## 2023-01-21 PROCEDURE — 36430 TRANSFUSION BLD/BLD COMPNT: CPT

## 2023-01-21 PROCEDURE — P9016 RBC LEUKOCYTES REDUCED: HCPCS | Performed by: FAMILY MEDICINE

## 2023-01-21 PROCEDURE — 25000003 PHARM REV CODE 250

## 2023-01-21 PROCEDURE — 96374 THER/PROPH/DIAG INJ IV PUSH: CPT

## 2023-01-21 PROCEDURE — 82607 VITAMIN B-12: CPT | Performed by: FAMILY MEDICINE

## 2023-01-21 PROCEDURE — 83010 ASSAY OF HAPTOGLOBIN QUANT: CPT

## 2023-01-21 PROCEDURE — 82746 ASSAY OF FOLIC ACID SERUM: CPT | Performed by: FAMILY MEDICINE

## 2023-01-21 RX ORDER — MEDROXYPROGESTERONE ACETATE 10 MG/1
10 TABLET ORAL DAILY
Status: DISCONTINUED | OUTPATIENT
Start: 2023-01-21 | End: 2023-01-21

## 2023-01-21 RX ORDER — HYDROCODONE BITARTRATE AND ACETAMINOPHEN 500; 5 MG/1; MG/1
TABLET ORAL
Status: DISCONTINUED | OUTPATIENT
Start: 2023-01-21 | End: 2023-01-21 | Stop reason: HOSPADM

## 2023-01-21 RX ORDER — FERROUS SULFATE 325(65) MG
325 TABLET, DELAYED RELEASE (ENTERIC COATED) ORAL EVERY OTHER DAY
Qty: 30 TABLET | Refills: 6 | Status: SHIPPED | OUTPATIENT
Start: 2023-01-21 | End: 2023-05-30 | Stop reason: SDUPTHER

## 2023-01-21 RX ORDER — ACETAMINOPHEN 325 MG/1
650 TABLET ORAL EVERY 8 HOURS PRN
Status: DISCONTINUED | OUTPATIENT
Start: 2023-01-21 | End: 2023-01-21 | Stop reason: HOSPADM

## 2023-01-21 RX ORDER — SODIUM CHLORIDE 0.9 % (FLUSH) 0.9 %
10 SYRINGE (ML) INJECTION
Status: DISCONTINUED | OUTPATIENT
Start: 2023-01-21 | End: 2023-01-21 | Stop reason: HOSPADM

## 2023-01-21 RX ORDER — TALC
6 POWDER (GRAM) TOPICAL NIGHTLY PRN
Status: DISCONTINUED | OUTPATIENT
Start: 2023-01-21 | End: 2023-01-21 | Stop reason: HOSPADM

## 2023-01-21 RX ORDER — LANOLIN ALCOHOL/MO/W.PET/CERES
1 CREAM (GRAM) TOPICAL EVERY OTHER DAY
Status: DISCONTINUED | OUTPATIENT
Start: 2023-01-21 | End: 2023-01-21 | Stop reason: HOSPADM

## 2023-01-21 RX ORDER — MEDROXYPROGESTERONE ACETATE 10 MG/1
20 TABLET ORAL DAILY
Qty: 60 TABLET | Refills: 11 | Status: ON HOLD | OUTPATIENT
Start: 2023-01-22 | End: 2023-07-31 | Stop reason: HOSPADM

## 2023-01-21 RX ORDER — MEDROXYPROGESTERONE ACETATE 10 MG/1
20 TABLET ORAL DAILY
Status: DISCONTINUED | OUTPATIENT
Start: 2023-01-21 | End: 2023-01-21 | Stop reason: HOSPADM

## 2023-01-21 RX ADMIN — FERROUS SULFATE TAB 325 MG (65 MG ELEMENTAL FE) 1 EACH: 325 (65 FE) TAB at 09:01

## 2023-01-21 RX ADMIN — FERUMOXYTOL 510 MG: 510 INJECTION INTRAVENOUS at 12:01

## 2023-01-21 NOTE — H&P
[facility] Internal Medicine History and Physical     Date of Admit: 1/20/2023    Chief Complaint     Was told to come to ED due to abnormal labs, anemia    Subjective:      History of Present Illness:  Kely Sparks is a 52 y.o. female who  has a past medical history of iron-deficiency anemia secondary to uterine fibroids and AUB. The patient presented to [facility] ED on 1/20/2023 with a primary complaint of anemia (Was told to come to ED due to low H&H. Hx of vaginal bleeding, has had blood transfusions in the past. Not bleeding currently. Denies weakness,fatigue, or pain. No distress.)  Patient reports that she has had poly menorrhea and menorrhagia for the past several years and she had very heavy bleeding over the last month.  She had a CBC ordered by her primary care physician 2 days ago and it demonstrated a anemia with a hemoglobin of 6.4.  She was told to present to the ER.  In the ER her blood counts are 6.8.  She states that she is not felt any lightheadedness, weakness, dizziness, chest pain, shortness a breath, dyspnea on exertion, or any other symptoms at this time.  She is not noticed any blood in her stool.  She has never had a colonoscopy performed, she has had a fit test done which was negative last year.  She states that she follows with OBGYN and she had previously been on Provera but has stopped taking this and her bleeding has stopped after stopping this.  She states that she does take iron b.i.d. and this is well tolerated.  She reports that she has had regular follow-up with Dr. Thibodeaux and last visit was in June of this year, she is previously been offered a hysterectomy however she did not want to proceed with this because she needed a back surgery.  She reports that she no longer needs the back surgery and is hoping to have the hysterectomy done ASAP.  She has received blood transfusions in the past and has tolerated them well.  She adamantly refuses having a rectal exam or pelvic exam  done today.    Past Medical History:  Past Medical History:   Diagnosis Date    Abnormal Pap smear of cervix     Anemia        Past Surgical History:  Past Surgical History:   Procedure Laterality Date     SECTION       SECTION      CHOLECYSTECTOMY      DILATION AND CURETTAGE OF UTERUS      HYSTEROSCOPY WITH DILATION AND CURETTAGE OF UTERUS N/A 2022    Procedure: HYSTEROSCOPY, WITH DILATION AND CURETTAGE OF UTERUS;  Surgeon: Susie Thibodeaux MD;  Location: [facility];  Service: OB/GYN;  Laterality: N/A;  **MYOSURE**    TUBAL LIGATION         Allergies:  Review of patient's allergies indicates:   Allergen Reactions    Shellfish containing products Hives and Itching       Home Medications:  Prior to Admission medications    Medication Sig Start Date End Date Taking? Authorizing Provider   ferrous sulfate 324 mg (65 mg iron) TbEC Take 1 tablet (324 mg total) by mouth 2 (two) times daily. 10/26/22   JUANA Tavares   medroxyPROGESTERone (PROVERA) 10 MG tablet Take 1 tablet (10 mg total) by mouth once daily. 10/26/22 10/26/23  Va Roche MD       Family History:  Family History   Problem Relation Age of Onset    Hypertension Mother     No Known Problems Father        Social History:  Social History     Tobacco Use    Smoking status: Never    Smokeless tobacco: Never   Substance Use Topics    Alcohol use: Never    Drug use: Never       Review of Systems:  Review of Systems   Constitutional:  Negative for chills and fever.   HENT:  Negative for congestion and sinus pain.    Respiratory:  Negative for cough, shortness of breath and wheezing.    Cardiovascular:  Negative for chest pain, palpitations, orthopnea and leg swelling.   Gastrointestinal:  Negative for abdominal pain, constipation, diarrhea, nausea and vomiting.   Genitourinary:  Negative for dysuria and hematuria.        Heavy vaginal bleeding   Musculoskeletal:  Negative for falls, joint pain and myalgias.   Skin:  Negative for rash.    Neurological:  Negative for dizziness and weakness.   Psychiatric/Behavioral:  The patient is not nervous/anxious.             Objective:   Last 24 Hour Vital Signs:  Vitals  BP: 132/76  Temp: 98.6 °F (37 °C)  Temp src: Oral  Pulse: 78  Resp: 16  SpO2: 99 %  Height: 5' (152.4 cm)  Weight: 120.2 kg (264 lb 15.9 oz)    Physical Examination:  General: Patient resting comfortably, in no acute distress   Eye: pupils equal, EOMI, clear conjunctiva, eyelids normal  HENT: Head-normocephalic and atraumatic  Neck: full range of motion, no thyromegaly or lymphadenopathy, trachea midline, supple  Respiratory: clear to auscultation bilaterally without wheezes, rales, rhonchi  Cardiovascular: regular rate and rhythm without murmurs.  No gallops or rubs, no JVD.  Capillary refill within normal limits.  Gastrointestinal: soft, non-tender, non-distended with normal bowel sounds, without masses to palpation  Genitourinary: no CVA tenderness to palpation  Musculoskeletal: full range of motion of all extremities/spine without limitation or discomfort  Integumentary: no rashes or skin lesions present  Neurologic: no signs of peripheral neurological deficit, motor/sensory function intact  Psychiatric:  alert and oriented, cognitive function intact, cooperative with exam        Laboratory:  Most Recent Data:  CMP:  Recent Labs   Lab 01/18/23  0802 01/20/23  1735   CHLORIDE 109* 108*   CO2 24 24   BUN 10.4 10.3   CREATININE 0.71 0.68   GLUCOSE 94 94   ALBUMIN 3.6 3.6   AST 15 17   ALT 12 11   ALKPHOS 55 65     CBC:  Recent Labs   Lab 01/18/23  0802 01/20/23  1735 01/21/23  0150   WBC 7.0 9.3 8.9   ABSNEUTRO 4.73 6.08 5.61   RBC 3.34* 3.54* 3.76*   HGB 6.4* 6.8* 7.9*   HCT 23.8* 25.5* 27.5*   MCV 71.3* 72.0* 73.1*   RDW 17.9* 18.0* 19.1*     Coags:   Lab Results   Component Value Date    INR 0.99 05/26/2022    PROTIME 12.9 05/26/2022    PTT 29.0 05/26/2022     FLP:   Lab Results   Component Value Date    CHOL 139 10/19/2022    HDL 40  10/19/2022    TRIG 29 (L) 10/19/2022     DM:   Lab Results   Component Value Date    HGBA1C 5.2 10/19/2022    CREATININE 0.68 01/20/2023     Thyroid:   Lab Results   Component Value Date    TSH 0.5711 05/26/2022     Anemia: No results found for: IRON, TIBC, FERRITIN, UAXPLCUH42, FOLATE  Cardiac: No results found for: TROPONINI, CKTOTAL, CKMB, BNP  Urinalysis:   Lab Results   Component Value Date    LABURIN (A) 10/19/2022     >/= 100,000 colonies/ml Streptococcus agalactiae (Group B)    PHUA 6.0 01/19/2023    UROBILINOGEN Normal 01/19/2023    WBCUA 6-10 (A) 01/19/2023       Trended Cardiac Data:  No results for input(s): TROPONINI, CKTOTAL, CKMB, BNP in the last 168 hours.      Radiology:  Imaging Results    None              Assessment & Plan:     Microcytic anemia  Abnormal uterine bleeding  - pt has history of abnormal uterine bleeding, on provera, follows with gynecology  - Slow decline in H/H noted in past month  - Was told to come to ED today due to worsening anemia found on labs, Hgb<7, requiring transfusion  - S/p 2 units transfusion pRBC, however only responded 1 point increase in Hgb and 2 points Hct, inappropriate response  - Will consider further causes of bleeding such as GI source, consider scope when GI services available. Denies overt melena or hematochezia  - Continue provera, continue oral iron supplements every other day  - will check iron studies, retic count, peripheral smear, ferritin level    CODE STATUS: Full Code  Access: Peripheral  Antibiotics: none  Diet: Regular  DVT Prophylaxis: Teds/SCDs  GI Prophylaxis: none needed  Fluids: none    Disposition: admit to observation for microcytic anemia workup, unsure of source of bleeding, will consider GI and gyn consults.    Jayshree Albarado, DO  [facility]  HO-1

## 2023-01-21 NOTE — CONSULTS
[facility] Internal Medicine Consult  Kely Sparks [id number]     Date of Admit: 1/20/2023    Chief Complaint     anemia (Was told to come to ED due to low H&H. Hx of vaginal bleeding, has had blood transfusions in the past. Not bleeding currently. Denies weakness,fatigue, or pain. No distress.)    Subjective:      History of Present Illness:  Kely Sparks is a 52 y.o. female who  has a past medical history of iron-deficiency anemia secondary to uterine fibroids and AUB. The patient presented to [facility] ED on 1/20/2023 with a primary complaint of anemia (Was told to come to ED due to low H&H. Hx of vaginal bleeding, has had blood transfusions in the past. Not bleeding currently. Denies weakness,fatigue, or pain. No distress.)  Patient reports that she has had poly menorrhea and menorrhagia for the past several years and she had very heavy bleeding over the last month.  She had a CBC ordered by her primary care physician 2 days ago and it demonstrated a anemia with a hemoglobin of 6.4.  She was told to present to the ER.  In the ER her blood counts are 6.8.  She states that she is not felt any lightheadedness, weakness, dizziness, chest pain, shortness a breath, dyspnea on exertion, or any other symptoms at this time.  She is not noticed any blood in her stool.  She has never had a colonoscopy performed, she has had a fit test done which was negative last year.  She states that she follows with OBGYN and she had previously been on Provera but has stopped taking this and her bleeding has stopped after stopping this.  She states that she does take iron b.i.d. and this is well tolerated.  She reports that she has had regular follow-up with Dr. Thibodeaux and last visit was in June of this year, she is previously been offered a hysterectomy however she did not want to proceed with this because she needed a back surgery.  She reports that she no longer needs the back surgery and is hoping to  have the hysterectomy done ASAP.  She has received blood transfusions in the past and has tolerated them well.  She adamantly refuses having a rectal exam or pelvic exam done today.      Past Medical History:  Past Medical History:   Diagnosis Date    Abnormal Pap smear of cervix     Anemia        Past Surgical History:  Past Surgical History:   Procedure Laterality Date     SECTION       SECTION      CHOLECYSTECTOMY      DILATION AND CURETTAGE OF UTERUS      HYSTEROSCOPY WITH DILATION AND CURETTAGE OF UTERUS N/A 2022    Procedure: HYSTEROSCOPY, WITH DILATION AND CURETTAGE OF UTERUS;  Surgeon: Susie Thibodeaux MD;  Location: [facility];  Service: OB/GYN;  Laterality: N/A;  **MYOSURE**    TUBAL LIGATION         Allergies:  Review of patient's allergies indicates:   Allergen Reactions    Shellfish containing products Hives and Itching       Home Medications:  Prior to Admission medications    Medication Sig Start Date End Date Taking? Authorizing Provider   ferrous sulfate 324 mg (65 mg iron) TbEC Take 1 tablet (324 mg total) by mouth 2 (two) times daily. 10/26/22   JUANA Tavares   medroxyPROGESTERone (PROVERA) 10 MG tablet Take 1 tablet (10 mg total) by mouth once daily. 10/26/22 10/26/23  Va Roche MD       Family History:  Family History   Problem Relation Age of Onset    Hypertension Mother     No Known Problems Father        Social History:  Social History     Tobacco Use    Smoking status: Never    Smokeless tobacco: Never   Substance Use Topics    Alcohol use: Never    Drug use: Never       Review of Systems:  Pertinent positives and negatives listed in HPI. All other systems are reviewed and are negative.       Objective:   Last 24 Hour Vital Signs:  Vitals  BP: (!) 152/86  Temp: 98 °F (36.7 °C)  Temp src: Oral  Pulse: 86  Resp: 18  SpO2: 100 %  Height: 5' (152.4 cm)  Weight: 120.2 kg (264 lb 15.9 oz)    Physical Examination:  Vitals:    23   BP: (!) 152/86   Pulse:  86   Resp: 18   Temp:       GA: Alert, comfortable, no acute distress.   HEENT: Adequate dentition. No visible oropharyngeal abnormalities. No scleral icterus or JVD. No visible thyromegally.   Pulmonary: Chest wall symmetric. No accessory muscle use. No abnormalities on percussion. No tenderness to palpation. Clear to auscultation A/P/L bilaterally. No wheezing, crackles, or rhonchi.  Cardiac: RRR S1 & S2 w/o rubs/murmurs/gallops. No lower extremity edema.   Abdominal: Bowel sounds present x 4. No appreciable hepatosplenomegaly.  Skin: No jaundice, rashes, or visible lesions.  Mild pallor.  Palpebral pallor.  Lymphatic: No cervical or inguinal lymphadenopathy.  Musculoskeletal: Moves all extremities 5/5.  Extremities: No clubbing or cyanosis.  Neuro:  No acute abnormalities, alert and oriented x3.    Laboratory:  Most Recent Data:  CMP:  Recent Labs   Lab 01/18/23  0802 01/20/23  1735   CHLORIDE 109* 108*   CO2 24 24   BUN 10.4 10.3   CREATININE 0.71 0.68   GLUCOSE 94 94   ALBUMIN 3.6 3.6   AST 15 17   ALT 12 11   ALKPHOS 55 65     CBC:  Recent Labs   Lab 01/18/23  0802 01/20/23  1735   WBC 7.0 9.3   ABSNEUTRO 4.73 6.08   RBC 3.34* 3.54*   HGB 6.4* 6.8*   HCT 23.8* 25.5*   MCV 71.3* 72.0*   RDW 17.9* 18.0*     Coags:   Lab Results   Component Value Date    INR 0.99 05/26/2022    PROTIME 12.9 05/26/2022    PTT 29.0 05/26/2022     FLP:   Lab Results   Component Value Date    CHOL 139 10/19/2022    HDL 40 10/19/2022    TRIG 29 (L) 10/19/2022     DM:   Lab Results   Component Value Date    HGBA1C 5.2 10/19/2022    CREATININE 0.68 01/20/2023     Thyroid:   Lab Results   Component Value Date    TSH 0.5711 05/26/2022     Anemia: No results found for: IRON, TIBC, FERRITIN, [id number], FOLATE  Cardiac: No results found for: TROPONINI, CKTOTAL, CKMB, BNP  Urinalysis:   Lab Results   Component Value Date    LABURIN (A) 10/19/2022     >/= 100,000 colonies/ml Streptococcus agalactiae (Group B)    PHUA 6.0 01/19/2023     UROBILINOGEN Normal 01/19/2023    WBCUA 6-10 (A) 01/19/2023       Trended Cardiac Data:  No results for input(s): TROPONINI, CKTOTAL, CKMB, BNP in the last 168 hours.      Radiology:  None new          Assessment & Plan:     Recommendations  Likely her anemia is secondary to abnormal uterine bleeding and has slowly decreased over the past month as evidenced by her lack of systemic symptoms of anemia, will provide her with a transfusion and will obtain post transfusion H&H, as no evidence of active bleeding per patient history she is stable to discharge following transfusion.  She is planning to follow up with gynecology asap.  Recommended to patient to do a pelvic exam and rectal exam however patient refused.  Risks and benefits were discussed and patient continued to refuse.      Discussed with Dr. Alba. This note is not complete until cosigned by attending physician.     Avi Delarosa MD - PGY2  [employer] [facility]

## 2023-01-21 NOTE — NURSING
TRANSFUSION COMPLETED AND IRON INFUSION AS ORDERED. PATIENT VOICED READINESS FOR DISCHARGE. DOES NOT WANT TO WAIT FOR REPEAT POST H/H. DENIES ANY C/O OR QUESTIONS OR CONCERNS.  NO ACUTE DISTRESS NOTED. AMBULATED TO VEHICLE. TOLERATED WELL.

## 2023-01-21 NOTE — ED PROVIDER NOTES
Encounter Date: 2023       History     Chief Complaint   Patient presents with    anemia     Was told to come to ED due to low H&H. Hx of vaginal bleeding, has had blood transfusions in the past. Not bleeding currently. Denies weakness,fatigue, or pain. No distress.     51 YO AAF in ER after her PCP called her and told her that her blood count was low from blood tests done on 23 and that she needed to come to the ER for a blood transfusion. Pt with hx of AUB and states the last time she bled was in early December but she has had intermittent spotting since with her last day of spotting being on Wednesday. No active bleeding. Denies rectal bleeding as well. Has had to have blood transfusion in the past. She denies fatigue, SOB, palpitations, HA or weakness/dizziness. Denies fever, chills, chest pain, abdominal pain, N/V/D or leg swelling. No other complaints.     The history is provided by the patient.   Review of patient's allergies indicates:   Allergen Reactions    Shellfish containing products Hives and Itching     Past Medical History:   Diagnosis Date    Abnormal Pap smear of cervix     Anemia      Past Surgical History:   Procedure Laterality Date     SECTION       SECTION      CHOLECYSTECTOMY      DILATION AND CURETTAGE OF UTERUS      HYSTEROSCOPY WITH DILATION AND CURETTAGE OF UTERUS N/A 2022    Procedure: HYSTEROSCOPY, WITH DILATION AND CURETTAGE OF UTERUS;  Surgeon: Susie Thibodeaux MD;  Location: [facility];  Service: OB/GYN;  Laterality: N/A;  **MYOSURE**    TUBAL LIGATION       Family History   Problem Relation Age of Onset    Hypertension Mother     No Known Problems Father      Social History     Tobacco Use    Smoking status: Never    Smokeless tobacco: Never   Substance Use Topics    Alcohol use: Never    Drug use: Never     Review of Systems   Constitutional:  Negative for chills and fever.   HENT:  Negative for congestion and sore throat.    Respiratory:  Negative for  shortness of breath.    Cardiovascular:  Negative for chest pain.   Gastrointestinal:  Negative for abdominal pain, diarrhea, nausea and vomiting.   Genitourinary:  Negative for dysuria.   Musculoskeletal:  Negative for back pain.   Skin:  Negative for rash.   Neurological:  Negative for dizziness, weakness, light-headedness and headaches.   Hematological:  Does not bruise/bleed easily.   All other systems reviewed and are negative.    Physical Exam     Initial Vitals [01/20/23 1559]   BP Pulse Resp Temp SpO2   (!) 145/77 92 18 98.9 °F (37.2 °C) 100 %      MAP       --         Physical Exam    Nursing note and vitals reviewed.  Constitutional: She appears well-developed and well-nourished. She is not diaphoretic. No distress.   HENT:   Head: Normocephalic and atraumatic.   Nose: Nose normal.   Eyes: Conjunctivae are normal.   Cardiovascular:  Normal rate, regular rhythm and normal heart sounds.           Pulmonary/Chest: Breath sounds normal. No respiratory distress.   Abdominal: Abdomen is soft. Bowel sounds are normal. There is no abdominal tenderness.   Genitourinary:    Genitourinary Comments: Pt declined pelvic and rectal exams     Musculoskeletal:         General: Normal range of motion.     Neurological: She is alert and oriented to person, place, and time.   Skin: Skin is warm and dry.   Psychiatric: She has a normal mood and affect.       ED Course   Critical Care    Date/Time: 1/20/2023 5:30 PM  Performed by: NADINE Bishop  Authorized by: Zane Alba MD   Direct patient critical care time: 30 minutes  Ordering / reviewing critical care time: 15 minutes  Total critical care time (exclusive of procedural time) : 45 minutes  Critical care was necessary to treat or prevent imminent or life-threatening deterioration of the following conditions: circulatory failure and shock.  Critical care was time spent personally by me on the following activities: discussions with consultants, examination of patient,  ordering and performing treatments and interventions, ordering and review of laboratory studies, review of old charts, vascular access procedures and obtaining history from patient or surrogate.      Labs Reviewed   COMPREHENSIVE METABOLIC PANEL - Abnormal; Notable for the following components:       Result Value    Chloride 108 (*)     Globulin 4.1 (*)     Albumin/Globulin Ratio 0.9 (*)     All other components within normal limits   CBC WITH DIFFERENTIAL - Abnormal; Notable for the following components:    RBC 3.54 (*)     Hgb 6.8 (*)     Hct 25.5 (*)     MCV 72.0 (*)     MCHC 26.7 (*)     RDW 18.0 (*)     MPV 12.5 (*)     All other components within normal limits   CBC WITH DIFFERENTIAL - Abnormal; Notable for the following components:    RBC 3.76 (*)     Hgb 7.9 (*)     Hct 27.5 (*)     MCV 73.1 (*)     MCHC 28.7 (*)     RDW 19.1 (*)     MPV 10.9 (*)     All other components within normal limits   CBC WITH DIFFERENTIAL - Abnormal; Notable for the following components:    RBC 3.65 (*)     Hgb 7.7 (*)     Hct 26.7 (*)     MCV 73.2 (*)     MCHC 28.8 (*)     RDW 18.8 (*)     MPV 10.5 (*)     All other components within normal limits   IRON AND TIBC - Abnormal; Notable for the following components:    Iron Binding Capacity Unsaturated 339 (*)     Iron Level 16 (*)     Iron Saturation 5 (*)     All other components within normal limits   RETICULOCYTES - Abnormal; Notable for the following components:    Retic Cnt Auto 1.00 (*)     All other components within normal limits   VITAMIN D - Abnormal; Notable for the following components:    Vit D 25 OH 24.4 (*)     All other components within normal limits   FERRITIN - Normal   FOLATE - Normal   VITAMIN B12 - Normal   HAPTOGLOBIN - Normal   CBC W/ AUTO DIFFERENTIAL    Narrative:     The following orders were created for panel order CBC auto differential.  Procedure                               Abnormality         Status                     ---------                                -----------         ------                     CBC with Differential[[id number]]        Abnormal            Final result                 Please view results for these tests on the individual orders.   CBC W/ AUTO DIFFERENTIAL    Narrative:     The following orders were created for panel order CBC Auto Differential.  Procedure                               Abnormality         Status                     ---------                               -----------         ------                     CBC with Differential[[id number]]        Abnormal            Final result                 Please view results for these tests on the individual orders.   CBC W/ AUTO DIFFERENTIAL    Narrative:     The following orders were created for panel order CBC Auto Differential.  Procedure                               Abnormality         Status                     ---------                               -----------         ------                     CBC with Differential[[id number]]        Abnormal            Final result                 Please view results for these tests on the individual orders.   PROTIME-INR   EXTRA TUBES    Narrative:     The following orders were created for panel order EXTRA TUBES.  Procedure                               Abnormality         Status                     ---------                               -----------         ------                     Light Blue Top Hold[[id number]]                              In process                 Gold Top Hold[[id number]]                                    In process                   Please view results for these tests on the individual orders.   LIGHT BLUE TOP HOLD   GOLD TOP HOLD   EXTRA TUBES    Narrative:     The following orders were created for panel order EXTRA TUBES.  Procedure                               Abnormality         Status                     ---------                               -----------         ------                     Light Blue Top  Hold[[id number]]                              In process                 Light Green Top Hold[[id number]]                             In process                 Gold Top Hold[[id number]]                                    In process                   Please view results for these tests on the individual orders.   LIGHT BLUE TOP HOLD   LIGHT GREEN TOP HOLD   GOLD TOP HOLD   TYPE & SCREEN   PREPARE RBC SOFT   PREPARE RBC SOFT   PREPARE RBC SOFT          Imaging Results    None          Medications   ferumoxytoL (FERAHEME) 510 mg in dextrose 5 % 100 mL IVPB (0 mg Intravenous Stopped 1/21/23 1248)     Medical Decision Making:   History:   Old Medical Records: I decided to obtain old medical records.  Old Records Summarized: records from clinic visits.       <> Summary of Records: Hx of anemia, H/H 6.4 and 23.8  Initial Assessment:   Severe anemia, will recheck labs and consult for admission for blood products  Clinical Tests:   Lab Tests: Ordered and Reviewed       <> Summary of Lab: H/H repeat today was 6.8/25.5  ED Management:  2 units PRBCs ordered in ER, IM consult for admission   Other:   I have discussed this case with another health care provider.       <> Summary of the Discussion: Case discussed with Dr. Alba for admission as well as Dr. Judd (IM Resident)           ED Course as of 02/04/23 1601   Fri Jan 20, 2023   1736 Pt labs just drawn at 1730   [TT]   1921 VSS, NAD, pt is non-toxic or ill appearing, labs reviewed with pt, will consult IM for admission for severe anemia at this time, case discussed with Dr. Alba and she performed a face to face evaluation of pt [TT]      ED Course User Index  [TT] NADINE Bishop                 Clinical Impression:   Final diagnoses:  [D64.9] Severe anemia (Primary)        ED Disposition Condition    Observation Stable                NADINE Bishop  01/20/23 2006       NADINE Bishop  01/20/23 2007       NADINE Bishop  01/20/23 2010       Jose Martell  PA  02/04/23 160

## 2023-01-21 NOTE — DISCHARGE SUMMARY
[facility] Internal Medicine Discharge Summary    Admitting Physician: Tico Peña MD  Attending Physician: Tico Peña MD  Date of Admit: 1/20/2023  Date of Discharge: 1/21/2023    Condition: Good  Outcome: Condition has improved and patient is now ready for discharge.  DISPOSITION: Home or Self Care        Discharge Diagnoses:     Patient Active Problem List   Diagnosis    Abnormal uterine bleeding (AUB)    Anemia    Obesity, Class III, BMI 40-49.9 (morbid obesity)    Preoperative exam for gynecologic surgery    Fibroid    Bulky or enlarged uterus    Status post hysteroscopy    Breast cancer screening    Colon cancer screening    Well adult exam       Principal Problem:  Iron deficiency anemia due to chronic blood loss    Consultants and Procedures:     Consultants:  IP CONSULT TO INTERNAL MEDICINE    Procedures:   * No surgery found *      Brief Admission History:      Kely Sparks is a 52 y.o. female who  has a past medical history of iron-deficiency anemia secondary to uterine fibroids and AUB. The patient presented to [facility] ED on 1/20/2023 with a primary complaint of anemia (Was told to come to ED due to low H&H. Hx of vaginal bleeding, has had blood transfusions in the past. Not bleeding currently. Denies weakness,fatigue, or pain. No distress.)  Patient reports that she has had poly menorrhea and menorrhagia for the past several years and she had very heavy bleeding over the last month.  She had a CBC ordered by her primary care physician 2 days ago and it demonstrated a anemia with a hemoglobin of 6.4.  She was told to present to the ER.  In the ER her blood counts are 6.8.  She states that she is not felt any lightheadedness, weakness, dizziness, chest pain, shortness a breath, dyspnea on exertion, or any other symptoms at this time.  She is not noticed any blood in her stool.  She has never had a colonoscopy performed.  She states that she follows with OBGYN and she had previously been  on Provera but has stopped taking this and her bleeding has stopped after stopping this.  She states that she does take iron b.i.d. and this is well tolerated.  She reports that she has had regular follow-up with Dr. Thibodeaux and last visit was in June of this year, she is previously been offered a hysterectomy however she did not want to proceed with this because she needed a back surgery.  She reports that she no longer needs the back surgery and is hoping to have the hysterectomy done ASAP.  She has received blood transfusions in the past and has tolerated them well.  She adamantly refuses having a rectal exam or pelvic exam done today.    Hospital Course with Pertinent Findings:      During this hospitalization patient was transfused 3 units of blood and received one dose of IV iron. She has been taking iron supplements twice a day, however we recommend taking every other day due to better absorption when taken as prescribed this way. Recommend close follow up with PCP and gynecology. Iron deficiency anemia likely due to AUB however cannot exclude colon cancer or GI bleeding, will send cologuard to be followed up with patient's PCP. She states that she has not had recent colon cancer screening to her knowledge and age above 45. Recommend provera 20 mg per gynecology recommendations. Pt has appt with PCP this  Wednesday. Patient is free to be discharged after blood administration and iron.    Discharge physical exam:  Vitals  BP: (!) 127/95  Temp: 98.7 °F (37.1 °C)  Temp src: Oral  Pulse: 78  Resp: 20  SpO2: 100 %  Height: 5' (152.4 cm)  Weight: 120.2 kg (264 lb 15.9 oz)    General: Patient resting comfortably, in no acute distress   Eye: pupils equal, EOMI, clear conjunctiva, eyelids normal  HENT: Head-normocephalic and atraumatic  Neck: full range of motion, no thyromegaly or lymphadenopathy, trachea midline, supple  Respiratory: clear to auscultation bilaterally without wheezes, rales, rhonchi  Cardiovascular:  regular rate and rhythm without murmurs.  No gallops or rubs, no JVD.  Capillary refill within normal limits.  Gastrointestinal: soft, non-tender, non-distended with normal bowel sounds, without masses to palpation  Genitourinary: no CVA tenderness to palpation  Musculoskeletal: bilateral leg edema, nonpitting  Integumentary: no rashes or skin lesions present  Neurologic: no signs of peripheral neurological deficit, motor/sensory function intact  Psychiatric:  alert and oriented, cognitive function intact, cooperative with exam      TIME SPENT ON DISCHARGE: 35 minutes    Discharge Medications:         Medication List        CHANGE how you take these medications      ferrous sulfate 325 (65 FE) MG EC tablet  Take 1 tablet (325 mg total) by mouth every other day.  What changed:   medication strength  how much to take  when to take this     medroxyPROGESTERone 10 MG tablet  Commonly known as: PROVERA  Take 2 tablets (20 mg total) by mouth once daily.  Start taking on: January 22, 2023  What changed: how much to take                Discharge Instructions:         Kely Sparks is being discharged Home or Self Care.    Discharge Procedure Orders   Cologuard Screening (Multitarget Stool DNA)   Standing Status: Future Standing Exp. Date: 03/21/24        Follow-Up Appointments:   Follow-up Information       JUANA Arredondo Follow up in 2 day(s).    Specialty: Family Medicine  Contact information:  [address] [facility] [address] [phone]                             At this time, Kely Sparks is determined to have maximized benefits of IP hospitalization. she is discharged in stable condition with OP f/u recommendations and instructions. All questions answered, and patient verbalized agreement with the POC. They were given return precautions prior to d/c including symptoms that should prompt return to ED or to call PCP. Total time spent of DC of 35 minutes.       Jayshree Junior  DO Per  [facility] Internal Medicine  HO-1

## 2023-01-25 ENCOUNTER — OFFICE VISIT (OUTPATIENT)
Dept: INTERNAL MEDICINE | Facility: CLINIC | Age: 53
End: 2023-01-25
Payer: COMMERCIAL

## 2023-01-25 DIAGNOSIS — D50.0 IRON DEFICIENCY ANEMIA DUE TO CHRONIC BLOOD LOSS: Primary | ICD-10-CM

## 2023-01-25 DIAGNOSIS — Z12.11 COLON CANCER SCREENING: ICD-10-CM

## 2023-01-25 PROCEDURE — 99213 OFFICE O/P EST LOW 20 MIN: CPT | Mod: 95,,, | Performed by: NURSE PRACTITIONER

## 2023-01-25 PROCEDURE — 99213 PR OFFICE/OUTPT VISIT, EST, LEVL III, 20-29 MIN: ICD-10-PCS | Mod: 95,,, | Performed by: NURSE PRACTITIONER

## 2023-01-25 NOTE — PROGRESS NOTES
Patient ID: [id number]     Chief Complaint: LAB RESULTS      HPI:     This is a telemedicine note. Patient was treated using telemedicine, real time audio, according to [facility] protocols. I, Sera ELIAS, conducted the visit from the [facility]. The patient participated in the visit at a non-[facility] location selected by the patient, identified below. I am licensed in the state where the patient stated they are located. The patient stated that they understood and accepted the privacy and security risks to their information at their location. This visit is not recorded.    Patient was located at the patient's home.       Kely Sparks is a 52 y.o. female here today for a telemedicine visit for f/u ER for anemia 23.  Pt states was given 2 units PRBcs and IV iron infusion. States she is feeling fine since ER visit.       ----------------------------  Abnormal Pap smear of cervix  Anemia     Past Surgical History:   Procedure Laterality Date     SECTION       SECTION      CHOLECYSTECTOMY      DILATION AND CURETTAGE OF UTERUS      HYSTEROSCOPY WITH DILATION AND CURETTAGE OF UTERUS N/A 2022    Procedure: HYSTEROSCOPY, WITH DILATION AND CURETTAGE OF UTERUS;  Surgeon: Susie Thibodeaux MD;  Location: [facility];  Service: OB/GYN;  Laterality: N/A;  **MYOSURE**    TUBAL LIGATION         Review of patient's allergies indicates:   Allergen Reactions    Shellfish containing products Hives and Itching       Outpatient Medications Marked as Taking for the 23 encounter (Office Visit) with JUANA Tavares   Medication Sig Dispense Refill    ferrous sulfate 325 (65 FE) MG EC tablet Take 1 tablet (325 mg total) by mouth every other day. 30 tablet 6    medroxyPROGESTERone (PROVERA) 10 MG tablet Take 2 tablets (20 mg total) by mouth once daily. 60 tablet 11       Social History     Socioeconomic History    Marital status: Single   Tobacco Use    Smoking status:  Never    Smokeless tobacco: Never   Substance and Sexual Activity    Alcohol use: Never    Drug use: Never    Sexual activity: Not Currently     Partners: Male     Social Determinants of Health     Financial Resource Strain: High Risk    Difficulty of Paying Living Expenses: Hard   Food Insecurity: Food Insecurity Present    Worried About Running Out of Food in the Last Year: Sometimes true    Ran Out of Food in the Last Year: Sometimes true   Transportation Needs: No Transportation Needs    Lack of Transportation (Medical): No    Lack of Transportation (Non-Medical): No   Physical Activity: Sufficiently Active    Days of Exercise per Week: 3 days    Minutes of Exercise per Session: 60 min   Stress: Stress Concern Present    Feeling of Stress : Very much   Social Connections: Unknown    Frequency of Social Gatherings with Friends and Family: Twice a week    Attends Judaism Services: More than 4 times per year    Active Member of Clubs or Organizations: No    Attends Club or Organization Meetings: Never   Housing Stability: High Risk    Unable to Pay for Housing in the Last Year: Yes    Number of Places Lived in the Last Year: 1    Unstable Housing in the Last Year: No        Family History   Problem Relation Age of Onset    Hypertension Mother     No Known Problems Father         Patient Care Team:  JUANA Tavares as PCP - General (Family Medicine)      Subjective:       Review of Systems       See HPI for details    Constitutional: Denies Change in appetite. Denies Chills. Denies Fever. Denies Night sweats.  Eye: Denies Blurred vision. Denies Discharge. Denies Eye pain.  ENT: Denies Decreased hearing. Denies Sore throat. Denies Swollen glands.  Respiratory: Denies Cough. Denies Shortness of breath. Denies Shortness of breath with exertion. Denies Wheezing.  Cardiovascular: DeniesChest pain at rest. Denies Chest pain with exertion. Denies Irregular heartbeat. Denies Palpitations. Denies  Edema.  Gastrointestinal: Denies Abdominal pain. DeniesDiarrhea. Denies Nausea. Denies Vomiting. Denies Hematemesis or Hematochezia.  Genitourinary: Denies Dysuria. Denies Urinary frequency. Denies Urinary urgency. Denies Blood in urine.  Endocrine: Denies Cold intolerance. Denies Excessive thirst. Denies Heat intolerance. Denies Weight loss. Denies Weight gain.  Musculoskeletal: Denies Painful joints. Denies Weakness.  Integumentary: Denies Rash. Denies Itching. Denies Dry skin.  Neurologic: Denies Dizziness. Denies Fainting. Denies Headache.  Psychiatric: Denies Depression. Denies Anxiety. Denies Suicidal/Homicidal ideations.    All Other ROS: Negative except as stated in HPI.       Objective:     There were no vitals taken for this visit.    Physical Exam    Physical Exam: LIMITED DUE TO TELEMEDICINE RESTRICTIONS.  General: Alert and oriented, No acute distress.  Head: Normocephalic, Atraumatic.  Eye: Sclera non-icteric.  Neck/Thyroid:  Full range of motion.  Respiratory: Non-labored respirations, Symmetrical chest wall expansion.  Musculoskeletal: Normal range of motion.  Integumentary:  No visible suspicious lesions or rashes. No diaphoresis.   Neurologic: No focal deficits  Psychiatric: Normal interaction, Coherent speech, Euthymic mood, Appropriate affect       Assessment:       ICD-10-CM ICD-9-CM   1. Anemia  D50.0 280.0   2. Colon cancer screening  Z12.11 V76.51        Plan:     1. Anemia  Pt seen in ER for Hgb 6.4 and transfused 2 united PRBCs. Pt informed to take Iron QOD per ER for better absorption. Cont Iron as prescribed. CBC and iron level in 1 month. Pt states she will call and schedule f/u in GYN cl for abnl vaginal bleeding.     2. Colon cancer screening   Pt ordered Cologuard per ER visit 1-20-23. Informed to completed as ordered.        No orders of the defined types were placed in this encounter.         Follow up in about 1 month (around 2/25/2023) for with labs 1 week prior to appt. In  addition to their scheduled follow up, the patient has also been instructed to follow up on as needed basis.       Video Time Documentation:  Spent 10 minutes with patient face to face discussed health concerns. More than 50% of this time was spent in counseling and coordination of care.Established Patient - Audio Only Telehealth Visit     The patient location is: home  The chief complaint leading to consultation is: f/u ER and lab.  Visit type: Virtual visit with audio only (telephone)  Total time spent with patient: 11 minutes       The reason for the audio only service rather than synchronous audio and video virtual visit was related to technical difficulties or patient preference/necessity.     Each patient to whom I provide medical services by telemedicine is:  (1) informed of the relationship between the physician and patient and the respective role of any other health care provider with respect to management of the patient; and (2) notified that they may decline to receive medical services by telemedicine and may withdraw from such care at any time. Patient verbally consented to receive this service via voice-only telephone call.       HPI: See above      Assessment and plan:  see Above                        This service was not originating from a related E/M service provided within the previous 7 days nor will  to an E/M service or procedure within the next 24 hours or my soonest available appointment.  Prevailing standard of care was able to be met in this audio-only visit.

## 2023-01-30 PROBLEM — Z00.00 WELL ADULT EXAM: Status: RESOLVED | Noted: 2022-10-26 | Resolved: 2023-01-30

## 2023-02-08 ENCOUNTER — PATIENT MESSAGE (OUTPATIENT)
Dept: ADMINISTRATIVE | Facility: HOSPITAL | Age: 53
End: 2023-02-08
Payer: COMMERCIAL

## 2023-02-20 ENCOUNTER — LAB VISIT (OUTPATIENT)
Dept: LAB | Facility: HOSPITAL | Age: 53
End: 2023-02-20
Attending: NURSE PRACTITIONER
Payer: COMMERCIAL

## 2023-02-20 DIAGNOSIS — D50.0 IRON DEFICIENCY ANEMIA DUE TO CHRONIC BLOOD LOSS: ICD-10-CM

## 2023-02-20 LAB
ANISOCYTOSIS BLD QL SMEAR: ABNORMAL
BASOPHILS # BLD AUTO: 0.05 X10(3)/MCL (ref 0–0.2)
BASOPHILS NFR BLD AUTO: 0.6 %
EOSINOPHIL # BLD AUTO: 0.28 X10(3)/MCL (ref 0–0.9)
EOSINOPHIL NFR BLD AUTO: 3.6 %
ERYTHROCYTE [DISTWIDTH] IN BLOOD BY AUTOMATED COUNT: 22.3 % (ref 11.5–17)
HCT VFR BLD AUTO: 27.3 % (ref 37–47)
HGB BLD-MCNC: 8 G/DL (ref 12–16)
HYPOCHROMIA BLD QL SMEAR: SLIGHT
IMM GRANULOCYTES # BLD AUTO: 0.01 X10(3)/MCL (ref 0–0.04)
IMM GRANULOCYTES NFR BLD AUTO: 0.1 %
IRON SATN MFR SERPL: 6 % (ref 20–50)
IRON SERPL-MCNC: 19 UG/DL (ref 50–170)
LYMPHOCYTES # BLD AUTO: 2.1 X10(3)/MCL (ref 0.6–4.6)
LYMPHOCYTES NFR BLD AUTO: 26.9 %
MCH RBC QN AUTO: 23.3 PG
MCHC RBC AUTO-ENTMCNC: 29.3 G/DL (ref 33–36)
MCV RBC AUTO: 79.4 FL (ref 80–94)
MICROCYTES BLD QL SMEAR: SLIGHT
MONOCYTES # BLD AUTO: 0.68 X10(3)/MCL (ref 0.1–1.3)
MONOCYTES NFR BLD AUTO: 8.7 %
NEUTROPHILS # BLD AUTO: 4.68 X10(3)/MCL (ref 2.1–9.2)
NEUTROPHILS NFR BLD AUTO: 60.1 %
NRBC BLD AUTO-RTO: 0 %
OVALOCYTES (OLG): ABNORMAL
PLATELET # BLD AUTO: 405 X10(3)/MCL (ref 130–400)
PLATELET # BLD EST: ABNORMAL 10*3/UL
PMV BLD AUTO: 10.9 FL (ref 7.4–10.4)
POIKILOCYTOSIS BLD QL SMEAR: ABNORMAL
RBC # BLD AUTO: 3.44 X10(6)/MCL (ref 4.2–5.4)
RBC MORPH BLD: ABNORMAL
TIBC SERPL-MCNC: 289 UG/DL (ref 70–310)
TIBC SERPL-MCNC: 308 UG/DL (ref 250–450)
TRANSFERRIN SERPL-MCNC: 278 MG/DL (ref 180–382)
WBC # SPEC AUTO: 7.8 X10(3)/MCL (ref 4.5–11.5)

## 2023-02-20 PROCEDURE — 83550 IRON BINDING TEST: CPT

## 2023-02-20 PROCEDURE — 36415 COLL VENOUS BLD VENIPUNCTURE: CPT

## 2023-02-20 PROCEDURE — 85025 COMPLETE CBC W/AUTO DIFF WBC: CPT

## 2023-02-22 ENCOUNTER — OFFICE VISIT (OUTPATIENT)
Dept: INTERNAL MEDICINE | Facility: CLINIC | Age: 53
End: 2023-02-22
Payer: COMMERCIAL

## 2023-02-22 DIAGNOSIS — Z00.00 WELL ADULT EXAM: ICD-10-CM

## 2023-02-22 DIAGNOSIS — D50.0 IRON DEFICIENCY ANEMIA DUE TO CHRONIC BLOOD LOSS: Primary | ICD-10-CM

## 2023-02-22 DIAGNOSIS — Z13.6 SCREENING FOR HYPERTENSION: ICD-10-CM

## 2023-02-22 PROCEDURE — 99213 OFFICE O/P EST LOW 20 MIN: CPT | Mod: 95,,, | Performed by: NURSE PRACTITIONER

## 2023-02-22 PROCEDURE — 99213 PR OFFICE/OUTPT VISIT, EST, LEVL III, 20-29 MIN: ICD-10-PCS | Mod: 95,,, | Performed by: NURSE PRACTITIONER

## 2023-02-22 RX ORDER — IBUPROFEN 600 MG/1
1200 TABLET ORAL 2 TIMES DAILY
Status: ON HOLD | COMMUNITY
Start: 2023-01-31 | End: 2023-07-31 | Stop reason: HOSPADM

## 2023-02-22 RX ORDER — METHOCARBAMOL 750 MG/1
750 TABLET, FILM COATED ORAL NIGHTLY
Status: ON HOLD | COMMUNITY
Start: 2023-01-23 | End: 2023-07-31 | Stop reason: SDUPTHER

## 2023-02-22 NOTE — PROGRESS NOTES
Patient ID: [id number]     Chief Complaint: lab results      HPI:     This is a telemedicine note. Patient was treated using telemedicine, real time audio, according to [facility] protocols. I, Sera ELIAS, conducted the visit from the [facility]. The patient participated in the visit at a non-[facility] location selected by the patient, identified below. I am licensed in the state where the patient stated they are located. The patient stated that they understood and accepted the privacy and security risks to their information at their location. This visit is not recorded.    Patient was located at the patient's home.       Kely Sparks is a 52 y.o. female here today for a telemedicine visit. For f/u ER visit for anemia. Pt states was given 4 units PRBCs in hospital. States feeling good.        ----------------------------  Abnormal Pap smear of cervix  Anemia     Past Surgical History:   Procedure Laterality Date     SECTION       SECTION      CHOLECYSTECTOMY      DILATION AND CURETTAGE OF UTERUS      HYSTEROSCOPY WITH DILATION AND CURETTAGE OF UTERUS N/A 2022    Procedure: HYSTEROSCOPY, WITH DILATION AND CURETTAGE OF UTERUS;  Surgeon: Susie Thibodeaux MD;  Location: [facility] OR;  Service: OB/GYN;  Laterality: N/A;  **MYOSURE**    TUBAL LIGATION         Review of patient's allergies indicates:   Allergen Reactions    Shellfish containing products Hives and Itching       Outpatient Medications Marked as Taking for the 23 encounter (Office Visit) with JUANA Tavares   Medication Sig Dispense Refill    ferrous sulfate 325 (65 FE) MG EC tablet Take 1 tablet (325 mg total) by mouth every other day. 30 tablet 6    ibuprofen (ADVIL,MOTRIN) 600 MG tablet Take 1,200 mg by mouth 2 (two) times daily.      medroxyPROGESTERone (PROVERA) 10 MG tablet Take 2 tablets (20 mg total) by mouth once daily. 60 tablet 11    methocarbamoL (ROBAXIN) 750 MG Tab Take 750 mg  by mouth every evening.         Social History     Socioeconomic History    Marital status: Single   Tobacco Use    Smoking status: Never    Smokeless tobacco: Never   Substance and Sexual Activity    Alcohol use: Never    Drug use: Never    Sexual activity: Not Currently     Partners: Male     Social Determinants of Health     Financial Resource Strain: High Risk    Difficulty of Paying Living Expenses: Hard   Food Insecurity: Food Insecurity Present    Worried About Running Out of Food in the Last Year: Sometimes true    Ran Out of Food in the Last Year: Sometimes true   Transportation Needs: No Transportation Needs    Lack of Transportation (Medical): No    Lack of Transportation (Non-Medical): No   Physical Activity: Sufficiently Active    Days of Exercise per Week: 3 days    Minutes of Exercise per Session: 60 min   Stress: Stress Concern Present    Feeling of Stress : Very much   Social Connections: Unknown    Frequency of Social Gatherings with Friends and Family: Twice a week    Attends Baptism Services: More than 4 times per year    Active Member of Clubs or Organizations: No    Attends Club or Organization Meetings: Never   Housing Stability: High Risk    Unable to Pay for Housing in the Last Year: Yes    Number of Places Lived in the Last Year: 1    Unstable Housing in the Last Year: No        Family History   Problem Relation Age of Onset    Hypertension Mother     No Known Problems Father         Patient Care Team:  JUANA Tavares as PCP - General (Family Medicine)      Subjective:       Review of Systems       See HPI for details    Constitutional: Denies Change in appetite. Denies Chills. Denies Fever. Denies Night sweats.  Eye: Denies Blurred vision. Denies Discharge. Denies Eye pain.  ENT: Denies Decreased hearing. Denies Sore throat. Denies Swollen glands.  Respiratory: Denies Cough. Denies Shortness of breath. Denies Shortness of breath with exertion. Denies Wheezing.  Cardiovascular:  DeniesChest pain at rest. Denies Chest pain with exertion. Denies Irregular heartbeat. Denies Palpitations. Denies Edema.  Gastrointestinal: Denies Abdominal pain. DeniesDiarrhea. Denies Nausea. Denies Vomiting. Denies Hematemesis or Hematochezia.  Genitourinary: Denies Dysuria. Denies Urinary frequency. Denies Urinary urgency. Denies Blood in urine.  Endocrine: Denies Cold intolerance. Denies Excessive thirst. Denies Heat intolerance. Denies Weight loss. Denies Weight gain.  Musculoskeletal: Denies Painful joints. Denies Weakness.  Integumentary: Denies Rash. Denies Itching. Denies Dry skin.  Neurologic: Denies Dizziness. Denies Fainting. Denies Headache.  Psychiatric: Denies Depression. Denies Anxiety. Denies Suicidal/Homicidal ideations.    All Other ROS: Negative except as stated in HPI.       Objective:     There were no vitals taken for this visit.    Physical Exam    Physical Exam: LIMITED DUE TO TELEMEDICINE RESTRICTIONS.  General: Alert and oriented, No acute distress.  Respiratory: Non-labored respirations  Psychiatric: Normal interaction, Coherent speech, Euthymic mood, Appropriate affect       Assessment:       ICD-10-CM ICD-9-CM   1. Anemia  D50.0 280.0   2. Screening for hypertension  Z13.6 V81.1   3. Well adult exam  Z00.00 V70.0        Plan:     1. Anemia  Pt states she was given 4 units PRBCs 1-20-23. States she is feeling fine. Informed will repeat CBC and iron profile in 3 months. Cont Ferrous Sulfate as prescribed.     2. Screening for hypertension  Labs in 3 months.    3. Well adult exam   Labs in 3 months. Will discuss Cologuard on next visit as pt states she never received package for test supplies.        No orders of the defined types were placed in this encounter.         No follow-ups on file. In addition to their scheduled follow up, the patient has also been instructed to follow up on as needed basis.       Video Time Documentation:  Spent 10 minutes with patient face to face discussed  health concerns. More than 50% of this time was spent in counseling and coordination of care.Established Patient - Audio Only Telehealth Visit     The patient location is: home  The chief complaint leading to consultation is: lab results  Visit type: Virtual visit with audio only (telephone)  Total time spent with patient: 16 minutes       The reason for the audio only service rather than synchronous audio and video virtual visit was related to technical difficulties or patient preference/necessity.     Each patient to whom I provide medical services by telemedicine is:  (1) informed of the relationship between the physician and patient and the respective role of any other health care provider with respect to management of the patient; and (2) notified that they may decline to receive medical services by telemedicine and may withdraw from such care at any time. Patient verbally consented to receive this service via voice-only telephone call.       HPI: see above     Assessment and plan:  see above                        This service was not originating from a related E/M service provided within the previous 7 days nor will  to an E/M service or procedure within the next 24 hours or my soonest available appointment.  Prevailing standard of care was able to be met in this audio-only visit.

## 2023-05-18 ENCOUNTER — PATIENT MESSAGE (OUTPATIENT)
Dept: ADMINISTRATIVE | Facility: HOSPITAL | Age: 53
End: 2023-05-18
Payer: COMMERCIAL

## 2023-05-29 ENCOUNTER — LAB VISIT (OUTPATIENT)
Dept: LAB | Facility: HOSPITAL | Age: 53
End: 2023-05-29
Attending: NURSE PRACTITIONER
Payer: COMMERCIAL

## 2023-05-29 DIAGNOSIS — R82.90 ABNORMAL URINE: ICD-10-CM

## 2023-05-29 DIAGNOSIS — D64.9 ANEMIA, UNSPECIFIED TYPE: ICD-10-CM

## 2023-05-29 DIAGNOSIS — E66.01 OBESITY, CLASS III, BMI 40-49.9 (MORBID OBESITY): ICD-10-CM

## 2023-05-29 DIAGNOSIS — D50.0 IRON DEFICIENCY ANEMIA DUE TO CHRONIC BLOOD LOSS: Primary | ICD-10-CM

## 2023-05-29 DIAGNOSIS — Z13.6 SCREENING FOR HYPERTENSION: ICD-10-CM

## 2023-05-29 DIAGNOSIS — N93.9 ABNORMAL UTERINE BLEEDING (AUB): ICD-10-CM

## 2023-05-29 DIAGNOSIS — Z01.818 PREOPERATIVE EXAM FOR GYNECOLOGIC SURGERY: ICD-10-CM

## 2023-05-29 PROBLEM — Z00.00 WELL ADULT EXAM: Status: RESOLVED | Noted: 2022-10-26 | Resolved: 2023-05-29

## 2023-05-29 LAB
ANION GAP SERPL CALC-SCNC: 6 MEQ/L
ANISOCYTOSIS BLD QL SMEAR: ABNORMAL
BASOPHILS # BLD AUTO: 0.06 X10(3)/MCL
BASOPHILS NFR BLD AUTO: 0.8 %
BUN SERPL-MCNC: 13.6 MG/DL (ref 9.8–20.1)
CALCIUM SERPL-MCNC: 9.2 MG/DL (ref 8.4–10.2)
CHLORIDE SERPL-SCNC: 108 MMOL/L (ref 98–107)
CO2 SERPL-SCNC: 25 MMOL/L (ref 22–29)
CREAT SERPL-MCNC: 0.73 MG/DL (ref 0.55–1.02)
CREAT/UREA NIT SERPL: 19
ELLIPTOCYTOSIS (OHS): SLIGHT
EOSINOPHIL # BLD AUTO: 0.26 X10(3)/MCL (ref 0–0.9)
EOSINOPHIL NFR BLD AUTO: 3.4 %
ERYTHROCYTE [DISTWIDTH] IN BLOOD BY AUTOMATED COUNT: 18.5 % (ref 11.5–17)
GFR SERPLBLD CREATININE-BSD FMLA CKD-EPI: >60 MLS/MIN/1.73/M2
GLUCOSE SERPL-MCNC: 95 MG/DL (ref 74–100)
HAV IGM SERPL QL IA: NONREACTIVE
HBV CORE IGM SERPL QL IA: NONREACTIVE
HBV SURFACE AG SERPL QL IA: NONREACTIVE
HCT VFR BLD AUTO: 25.2 % (ref 37–47)
HCV AB SERPL QL IA: NONREACTIVE
HGB BLD-MCNC: 6.7 G/DL (ref 12–16)
HIV 1+2 AB+HIV1 P24 AG SERPL QL IA: NONREACTIVE
HYPOCHROMIA BLD QL SMEAR: ABNORMAL
IMM GRANULOCYTES # BLD AUTO: 0.02 X10(3)/MCL (ref 0–0.04)
IMM GRANULOCYTES NFR BLD AUTO: 0.3 %
IRON SATN MFR SERPL: 3 % (ref 20–50)
IRON SERPL-MCNC: 9 UG/DL (ref 50–170)
LYMPHOCYTES # BLD AUTO: 1.69 X10(3)/MCL (ref 0.6–4.6)
LYMPHOCYTES NFR BLD AUTO: 22.4 %
MCH RBC QN AUTO: 18.3 PG (ref 27–31)
MCHC RBC AUTO-ENTMCNC: 26.6 G/DL (ref 33–36)
MCV RBC AUTO: 68.7 FL (ref 80–94)
MONOCYTES # BLD AUTO: 0.72 X10(3)/MCL (ref 0.1–1.3)
MONOCYTES NFR BLD AUTO: 9.5 %
NEUTROPHILS # BLD AUTO: 4.81 X10(3)/MCL (ref 2.1–9.2)
NEUTROPHILS NFR BLD AUTO: 63.6 %
NRBC BLD AUTO-RTO: 0 %
PLATELET # BLD AUTO: 336 X10(3)/MCL (ref 130–400)
PLATELET # BLD EST: ADEQUATE 10*3/UL
PMV BLD AUTO: 11.9 FL (ref 7.4–10.4)
POTASSIUM SERPL-SCNC: 4.6 MMOL/L (ref 3.5–5.1)
RBC # BLD AUTO: 3.67 X10(6)/MCL (ref 4.2–5.4)
RBC MORPH BLD: ABNORMAL
SCHISTOCYTE (OLG): SLIGHT
SODIUM SERPL-SCNC: 139 MMOL/L (ref 136–145)
TIBC SERPL-MCNC: 323 UG/DL (ref 70–310)
TIBC SERPL-MCNC: 332 UG/DL (ref 250–450)
TRANSFERRIN SERPL-MCNC: 296 MG/DL (ref 180–382)
WBC # SPEC AUTO: 7.56 X10(3)/MCL (ref 4.5–11.5)

## 2023-05-29 PROCEDURE — 87389 HIV-1 AG W/HIV-1&-2 AB AG IA: CPT

## 2023-05-29 PROCEDURE — 83550 IRON BINDING TEST: CPT

## 2023-05-29 PROCEDURE — 80074 ACUTE HEPATITIS PANEL: CPT

## 2023-05-29 PROCEDURE — 80048 BASIC METABOLIC PNL TOTAL CA: CPT

## 2023-05-29 PROCEDURE — 85025 COMPLETE CBC W/AUTO DIFF WBC: CPT

## 2023-05-29 PROCEDURE — 36415 COLL VENOUS BLD VENIPUNCTURE: CPT

## 2023-05-30 ENCOUNTER — TELEPHONE (OUTPATIENT)
Dept: INTERNAL MEDICINE | Facility: CLINIC | Age: 53
End: 2023-05-30
Payer: COMMERCIAL

## 2023-05-30 ENCOUNTER — HOSPITAL ENCOUNTER (EMERGENCY)
Facility: HOSPITAL | Age: 53
Discharge: HOME | End: 2023-05-30
Attending: STUDENT IN AN ORGANIZED HEALTH CARE EDUCATION/TRAINING PROGRAM
Payer: COMMERCIAL

## 2023-05-30 VITALS
HEART RATE: 88 BPM | RESPIRATION RATE: 18 BRPM | BODY MASS INDEX: 49.51 KG/M2 | HEIGHT: 60 IN | WEIGHT: 252.19 LBS | TEMPERATURE: 99 F | DIASTOLIC BLOOD PRESSURE: 71 MMHG | OXYGEN SATURATION: 99 % | SYSTOLIC BLOOD PRESSURE: 119 MMHG

## 2023-05-30 DIAGNOSIS — D64.9 ANEMIA, UNSPECIFIED TYPE: ICD-10-CM

## 2023-05-30 DIAGNOSIS — D21.9 FIBROIDS: ICD-10-CM

## 2023-05-30 DIAGNOSIS — N93.8 DYSFUNCTIONAL UTERINE BLEEDING: Primary | ICD-10-CM

## 2023-05-30 LAB
ABO + RH BLD: NORMAL
ALBUMIN SERPL-MCNC: 3.7 G/DL (ref 3.5–5)
ALBUMIN/GLOB SERPL: 0.9 RATIO (ref 1.1–2)
ALP SERPL-CCNC: 63 UNIT/L (ref 40–150)
ALT SERPL-CCNC: 16 UNIT/L (ref 0–55)
APPEARANCE UR: CLEAR
AST SERPL-CCNC: 25 UNIT/L (ref 5–34)
B-HCG FREE SERPL-ACNC: <2.42 MIU/ML
B-HCG UR QL: NEGATIVE
BACTERIA #/AREA URNS AUTO: ABNORMAL /HPF
BASOPHILS # BLD AUTO: 0.05 X10(3)/MCL
BASOPHILS NFR BLD AUTO: 0.6 %
BILIRUB UR QL STRIP.AUTO: NEGATIVE MG/DL
BILIRUBIN DIRECT+TOT PNL SERPL-MCNC: 0.4 MG/DL
BLD PROD TYP BPU: NORMAL
BLOOD UNIT EXPIRATION DATE: NORMAL
BLOOD UNIT TYPE CODE: 5100
BUN SERPL-MCNC: 13.9 MG/DL (ref 9.8–20.1)
CALCIUM SERPL-MCNC: 9.5 MG/DL (ref 8.4–10.2)
CHLORIDE SERPL-SCNC: 106 MMOL/L (ref 98–107)
CO2 SERPL-SCNC: 27 MMOL/L (ref 22–29)
COLOR UR: ABNORMAL
CREAT SERPL-MCNC: 0.78 MG/DL (ref 0.55–1.02)
CROSSMATCH INTERPRETATION: NORMAL
CTP QC/QA: YES
DISPENSE STATUS: NORMAL
EOSINOPHIL # BLD AUTO: 0.22 X10(3)/MCL (ref 0–0.9)
EOSINOPHIL NFR BLD AUTO: 2.5 %
ERYTHROCYTE [DISTWIDTH] IN BLOOD BY AUTOMATED COUNT: 18.6 % (ref 11.5–17)
GFR SERPLBLD CREATININE-BSD FMLA CKD-EPI: >60 MLS/MIN/1.73/M2
GLOBULIN SER-MCNC: 4.1 GM/DL (ref 2.4–3.5)
GLUCOSE SERPL-MCNC: 79 MG/DL (ref 74–100)
GLUCOSE UR QL STRIP.AUTO: NORMAL MG/DL
GROUP & RH: NORMAL
HCT VFR BLD AUTO: 24.7 % (ref 37–47)
HCT VFR BLD AUTO: 25.5 % (ref 37–47)
HGB BLD-MCNC: 6.7 G/DL (ref 12–16)
HGB BLD-MCNC: 7.3 G/DL (ref 12–16)
HYALINE CASTS #/AREA URNS LPF: ABNORMAL /LPF
IMM GRANULOCYTES # BLD AUTO: 0.02 X10(3)/MCL (ref 0–0.04)
IMM GRANULOCYTES NFR BLD AUTO: 0.2 %
INDIRECT COOMBS GEL: NORMAL
KETONES UR QL STRIP.AUTO: NEGATIVE MG/DL
LEUKOCYTE ESTERASE UR QL STRIP.AUTO: NEGATIVE UNIT/L
LYMPHOCYTES # BLD AUTO: 1.93 X10(3)/MCL (ref 0.6–4.6)
LYMPHOCYTES NFR BLD AUTO: 22 %
MCH RBC QN AUTO: 18.2 PG (ref 27–31)
MCHC RBC AUTO-ENTMCNC: 27.1 G/DL (ref 33–36)
MCV RBC AUTO: 67.1 FL (ref 80–94)
MONOCYTES # BLD AUTO: 0.83 X10(3)/MCL (ref 0.1–1.3)
MONOCYTES NFR BLD AUTO: 9.5 %
MUCOUS THREADS URNS QL MICRO: ABNORMAL /LPF
NEUTROPHILS # BLD AUTO: 5.73 X10(3)/MCL (ref 2.1–9.2)
NEUTROPHILS NFR BLD AUTO: 65.2 %
NITRITE UR QL STRIP.AUTO: NEGATIVE
NRBC BLD AUTO-RTO: 0 %
PH UR STRIP.AUTO: 5.5 [PH]
PLATELET # BLD AUTO: 309 X10(3)/MCL (ref 130–400)
PMV BLD AUTO: 11.9 FL (ref 7.4–10.4)
POTASSIUM SERPL-SCNC: 4.1 MMOL/L (ref 3.5–5.1)
PROT SERPL-MCNC: 7.8 GM/DL (ref 6.4–8.3)
PROT UR QL STRIP.AUTO: NEGATIVE MG/DL
RBC # BLD AUTO: 3.68 X10(6)/MCL (ref 4.2–5.4)
RBC #/AREA URNS AUTO: ABNORMAL /HPF
RBC UR QL AUTO: ABNORMAL UNIT/L
SODIUM SERPL-SCNC: 139 MMOL/L (ref 136–145)
SP GR UR STRIP.AUTO: 1.02
SPECIMEN OUTDATE: NORMAL
SQUAMOUS #/AREA URNS LPF: ABNORMAL /HPF
UNIT NUMBER: NORMAL
UROBILINOGEN UR STRIP-ACNC: NORMAL MG/DL
WBC # SPEC AUTO: 8.78 X10(3)/MCL (ref 4.5–11.5)
WBC #/AREA URNS AUTO: ABNORMAL /HPF

## 2023-05-30 PROCEDURE — P9016 RBC LEUKOCYTES REDUCED: HCPCS | Performed by: STUDENT IN AN ORGANIZED HEALTH CARE EDUCATION/TRAINING PROGRAM

## 2023-05-30 PROCEDURE — 80053 COMPREHEN METABOLIC PANEL: CPT | Performed by: STUDENT IN AN ORGANIZED HEALTH CARE EDUCATION/TRAINING PROGRAM

## 2023-05-30 PROCEDURE — 81001 URINALYSIS AUTO W/SCOPE: CPT | Performed by: STUDENT IN AN ORGANIZED HEALTH CARE EDUCATION/TRAINING PROGRAM

## 2023-05-30 PROCEDURE — 85025 COMPLETE CBC W/AUTO DIFF WBC: CPT | Performed by: STUDENT IN AN ORGANIZED HEALTH CARE EDUCATION/TRAINING PROGRAM

## 2023-05-30 PROCEDURE — 99285 EMERGENCY DEPT VISIT HI MDM: CPT | Mod: 25

## 2023-05-30 PROCEDURE — 85610 PROTHROMBIN TIME: CPT | Performed by: STUDENT IN AN ORGANIZED HEALTH CARE EDUCATION/TRAINING PROGRAM

## 2023-05-30 PROCEDURE — 81025 URINE PREGNANCY TEST: CPT | Performed by: STUDENT IN AN ORGANIZED HEALTH CARE EDUCATION/TRAINING PROGRAM

## 2023-05-30 PROCEDURE — 85014 HEMATOCRIT: CPT | Performed by: INTERNAL MEDICINE

## 2023-05-30 PROCEDURE — 84702 CHORIONIC GONADOTROPIN TEST: CPT | Performed by: STUDENT IN AN ORGANIZED HEALTH CARE EDUCATION/TRAINING PROGRAM

## 2023-05-30 PROCEDURE — 86900 BLOOD TYPING SEROLOGIC ABO: CPT | Mod: 91 | Performed by: STUDENT IN AN ORGANIZED HEALTH CARE EDUCATION/TRAINING PROGRAM

## 2023-05-30 PROCEDURE — 86920 COMPATIBILITY TEST SPIN: CPT | Performed by: STUDENT IN AN ORGANIZED HEALTH CARE EDUCATION/TRAINING PROGRAM

## 2023-05-30 PROCEDURE — 86900 BLOOD TYPING SEROLOGIC ABO: CPT | Performed by: STUDENT IN AN ORGANIZED HEALTH CARE EDUCATION/TRAINING PROGRAM

## 2023-05-30 PROCEDURE — 36430 TRANSFUSION BLD/BLD COMPNT: CPT

## 2023-05-30 RX ORDER — FERROUS SULFATE 325(65) MG
325 TABLET, DELAYED RELEASE (ENTERIC COATED) ORAL 2 TIMES DAILY
Qty: 60 TABLET | Refills: 1 | Status: ON HOLD | OUTPATIENT
Start: 2023-05-30 | End: 2023-07-31 | Stop reason: HOSPADM

## 2023-05-30 NOTE — ED PROVIDER NOTES
Encounter Date: 2023       History     Chief Complaint   Patient presents with    Vaginal Bleeding     Called by PCP to report to ER due to low H/H. Reports recent heavy menstruation.      52-year-old female presents to ED for reported anemia and persistent vaginal bleeding.  Reports known fibroids, last ultrasound per medical record review was May of last year demonstrated fibroids largest 7 x 7 x 7 cm.  Reports heavy monthly vaginal bleeding that last for several days followed by spotting for the rest of the month.  Last year was on Provera that made her vaginal bleeding worse so she stopped taking it.  Was on twice daily iron supplementation that was changed recently to every other day.  Had outpatient labs performed yesterday that demonstrated hemoglobin of 6.7 and recommended to come to the emergency department for transfusion.  Patient denies any symptoms.  No chest pain or pressure, no shortness of breath, no weakness, no lightheadedness, ambulatory without difficulties.  Denies any pelvic pain or pressure.  Denies any rectal bleeding, no hematuria, no hematemesis.  No other complaints or concerns at this time.      Review of patient's allergies indicates:   Allergen Reactions    Shellfish containing products Hives and Itching     Past Medical History:   Diagnosis Date    Abnormal Pap smear of cervix     Anemia      Past Surgical History:   Procedure Laterality Date     SECTION       SECTION      CHOLECYSTECTOMY      DILATION AND CURETTAGE OF UTERUS      HYSTEROSCOPY WITH DILATION AND CURETTAGE OF UTERUS N/A 2022    Procedure: HYSTEROSCOPY, WITH DILATION AND CURETTAGE OF UTERUS;  Surgeon: Susie Thibodeaux MD;  Location: [facility];  Service: OB/GYN;  Laterality: N/A;  **MYOSURE**    TUBAL LIGATION       Family History   Problem Relation Age of Onset    Hypertension Mother     No Known Problems Father      Social History     Tobacco Use    Smoking status: Never    Smokeless tobacco: Never    Substance Use Topics    Alcohol use: Never    Drug use: Never     Review of Systems   Constitutional:  Negative for chills, diaphoresis and fever.   HENT:  Negative for congestion, rhinorrhea, sinus pain and sore throat.    Eyes:  Negative for pain, discharge and itching.   Respiratory:  Negative for cough, chest tightness and shortness of breath.    Cardiovascular:  Negative for chest pain and palpitations.   Gastrointestinal:  Negative for abdominal pain, nausea and vomiting.   Genitourinary:  Positive for menstrual problem and vaginal bleeding. Negative for dysuria, flank pain, hematuria, vaginal discharge and vaginal pain.   Musculoskeletal:  Negative for back pain and myalgias.   Skin:  Negative for color change and rash.   Neurological:  Negative for dizziness, weakness and headaches.   Psychiatric/Behavioral:  Negative for confusion. The patient is not hyperactive.      Physical Exam     Initial Vitals [05/30/23 1236]   BP Pulse Resp Temp SpO2   137/72 103 16 99.3 °F (37.4 °C) 99 %      MAP       --         Physical Exam    Vitals reviewed.  Constitutional: She appears well-developed and well-nourished. She is not diaphoretic. No distress.   HENT:   Head: Normocephalic and atraumatic.   Eyes: Conjunctivae and EOM are normal. Pupils are equal, round, and reactive to light.   Neck: Neck supple. No tracheal deviation present.   Normal range of motion.  Cardiovascular:  Normal rate, regular rhythm, normal heart sounds and intact distal pulses.           Pulmonary/Chest: Breath sounds normal. No respiratory distress.   Abdominal: Abdomen is soft. There is no abdominal tenderness. There is no rebound, no guarding, no tenderness at McBurney's point and negative Zapata's sign. negative Rovsing's sign  Musculoskeletal:         General: Normal range of motion.      Cervical back: Normal range of motion and neck supple.     Neurological: She is alert and oriented to person, place, and time. She has normal strength.  GCS score is 15. GCS eye subscore is 4. GCS verbal subscore is 5. GCS motor subscore is 6.   Skin: Skin is warm and dry. Capillary refill takes less than 2 seconds. No rash noted.   Psychiatric: She has a normal mood and affect. Her behavior is normal. Judgment and thought content normal.       ED Course   Critical Care    Date/Time: 5/30/2023 1:41 PM  Performed by: Giovanny Brady MD  Authorized by: Giovanny Brady MD   Total critical care time (exclusive of procedural time) : 0 minutes  Critical care time was exclusive of separately billable procedures and treating other patients.  Critical care was time spent personally by me on the following activities: evaluation of patient's response to treatment, obtaining history from patient or surrogate, ordering and review of laboratory studies, pulse oximetry, review of old charts, development of treatment plan with patient or surrogate, examination of patient, ordering and performing treatments and interventions, ordering and review of radiographic studies and re-evaluation of patient's condition.  Comments: Anemia requiring transfusion.      Labs Reviewed   COMPREHENSIVE METABOLIC PANEL - Abnormal; Notable for the following components:       Result Value    Globulin 4.1 (*)     Albumin/Globulin Ratio 0.9 (*)     All other components within normal limits   URINALYSIS, REFLEX TO URINE CULTURE - Abnormal; Notable for the following components:    Blood, UA 2+ (*)     Bacteria, UA Trace (*)     Squamous Epithelial Cells, UA Trace (*)     Mucous, UA Trace (*)     All other components within normal limits   CBC WITH DIFFERENTIAL - Abnormal; Notable for the following components:    RBC 3.68 (*)     Hgb 6.7 (*)     Hct 24.7 (*)     MCV 67.1 (*)     MCH 18.2 (*)     MCHC 27.1 (*)     RDW 18.6 (*)     MPV 11.9 (*)     All other components within normal limits   HEMOGLOBIN AND HEMATOCRIT, BLOOD - Abnormal; Notable for the following components:    Hgb 7.3 (*)     Hct 25.5 (*)     All  other components within normal limits   HCG, QUANTITATIVE - Normal   CBC W/ AUTO DIFFERENTIAL    Narrative:     The following orders were created for panel order CBC W/ AUTO DIFFERENTIAL.  Procedure                               Abnormality         Status                     ---------                               -----------         ------                     CBC with Differential[[id number]]        Abnormal            Final result                 Please view results for these tests on the individual orders.   PROTIME-INR   EXTRA TUBES    Narrative:     The following orders were created for panel order EXTRA TUBES.  Procedure                               Abnormality         Status                     ---------                               -----------         ------                     Light Green Top Hold[[id number]]                             In process                 Gold Top Hold[[id number]]                                    In process                   Please view results for these tests on the individual orders.   LIGHT GREEN TOP HOLD   GOLD TOP HOLD   POCT URINE PREGNANCY   TYPE & SCREEN   PREPARE RBC SOFT          Imaging Results              US Pelvis Comp with Transvag NON-OB (xpd (Final result)  Result time 05/30/23 15:04:19      Final result by Puma Wise MD (05/30/23 15:04:19)                   Impression:      1. Limited assessment.  Enlarged uterus with at least 2 suspected fibroids.  2. Ovaries not visualized.      Electronically signed by: Puma Wise  Date:    05/30/2023  Time:    15:04               Narrative:    EXAMINATION:  US PELVIS COMP WITH TRANSVAG NON-OB (XPD)    CLINICAL HISTORY:  worsening vaginal bleeding, anemic, fibroids 6 months ago;    TECHNIQUE:  Transabdominal and endovaginal ultrasound of the pelvis.    COMPARISON:  Ultrasound 05/26/2022    FINDINGS:  Poor overall uterine visualization due to decreased sonographic penetration.  Uterus is enlarged with at least 2  suspected fibroids on the transabdominal images.  These measure up to 4.2 and 6.4 cm.  Obscured endometrial stripe.  Ovaries not visualized.  No large volume pelvic ascites.    Measurements:    Uterus: 11.7 x 9.4 x 8.2 cm    Right ovary: Obscured    Left ovary: Obscured                                       Medications - No data to display  Medical Decision Making:   History:   Old Medical Records: I decided to obtain old medical records.  Initial Assessment:   52-year-old female with persistent vaginal bleeding with reported anemia  Differential Diagnosis:   Dysfunctional uterine bleeding  Uterine fibroids  Cancer  Chronic anemia  Hemoglobinopathies  Coagulopathies  Clinical Tests:   Lab Tests: Ordered and Reviewed  Radiological Study: Reviewed and Ordered  ED Management:  Patient in no acute distress, stable vitals and no complaints.  Denies even exertional dyspnea, weakness, fatigue, etc.  Has previously been offered hysterectomy but declined.  Persistent vaginal bleeding.  Asymptomatic even know hemoglobin less than 7.  Extensive bedside conversation had with patient.  Was ultimately agreeable to labs, gyn consultation and transfusion.  Labs confirmed hemoglobin yesterday of 6.7.  Transfusion ordered and awaiting IV access for administration (initial lost).  Gynecology saw the patient and recommended outpatient follow up.  Did not recommend any additional interventions or meds at this time.  Patient declined admission and requested transfusion and discharge directly from the emergency department.  All parties voiced understanding with his request.  At 5:15pm patient signed out to my partner Dr. Yan for follow-up post transfusion, repeat CBC and discharge.  Significant time was spent in isolation managing this patient's care. (Caitlyn)                     7:24 PM    At this time pt stable. Repeat hemoglobin 7.3. A second unit of blood was offered but patient states feeling fine and ready for discharge. States  "Gyn will start her on a new medication to try her vaginal bleeding and will see her in 6 weeks. States she just came because her PCP call her with the low "blood count" but she was feeling fine. Was instructed to decrease her iron supplementation to every other day.  Recommendation to return to ED if concerning symptoms of anemia and to return to her iron supplementation BID.     Clinical Impression:   Final diagnoses:  [N93.8] Dysfunctional uterine bleeding (Primary)  [D64.9] Anemia, unspecified type  [D21.9] Fibroids        ED Disposition Condition    Discharge Stable          ED Prescriptions       Medication Sig Dispense Start Date End Date Auth. Provider    ferrous sulfate 325 (65 FE) MG EC tablet Take 1 tablet (325 mg total) by mouth 2 (two) times daily. 60 tablet 5/30/2023 -- Jewel Cerrato MD          Follow-up Information       Follow up With Specialties Details Why Contact Info    [facility] In 1 week  [address] [facility] [address]  [phone]      [facility] Emergency Medicine  If symptoms worsen [address] [id number] [phone]    [facility] Gynecology In 1 month  [address] [facility] [id number]             Giovanny Brady MD  05/30/23 1721       Jewel Cerrato MD  05/30/23 1927       Jewel Cerrato MD  05/30/23 1928    "

## 2023-05-30 NOTE — CONSULTS
[facility] Consult H&P     Subjective:      History of Present Illness:  Kely Sparks is a 52 y.o.  who presented to [facility] ED today per recommendation for blood transfusion from her primary care physician based upon on outpatient labs. Patient is asymptomatic.    Patient with known AUB-L and symptomatic anemia requiring previous transfusions. She was previously worked up for hysterectomy however patient wasn't ready at the time to pursue surgery. She was started on PO provera at that time, which she discontinued due to reported worsening in symptoms. She reports regular cyclic heavy menses, often occurring twice per month. Patient denies any other complaints. Denies fatigue, weakness, or SOB. Patient interested in continuing with medical management. Is not yet ready for surgical management though she desires this in the future.     Review of Systems:  Pertinent items are noted in HPI. All other systems are reviewed and are negative.    Past Medical History:  Past Medical History:   Diagnosis Date    Abnormal Pap smear of cervix     Anemia        Past Surgical History:  Past Surgical History:   Procedure Laterality Date     SECTION       SECTION      CHOLECYSTECTOMY      DILATION AND CURETTAGE OF UTERUS      HYSTEROSCOPY WITH DILATION AND CURETTAGE OF UTERUS N/A 2022    Procedure: HYSTEROSCOPY, WITH DILATION AND CURETTAGE OF UTERUS;  Surgeon: Susie Thibodeaux MD;  Location: [facility];  Service: OB/GYN;  Laterality: N/A;  **MYOSURE**    TUBAL LIGATION         Obstetrical History:  OB History    Para Term  AB Living   2 2 2     2   SAB IAB Ectopic Multiple Live Births           2      # Outcome Date GA Lbr Kevin/2nd Weight Sex Delivery Anes PTL Lv   2 Term     M CS-LTranv  N LESLIE   1 Term     F CS-LTranv  N LESLIE      Obstetric Comments   BB 8lb 4oz   Triad: I/5-7       Gynecologic History:   No LMP recorded. Patient is perimenopausal.  STD history:  Denies  Pap smear history: Denies abnormal paps    Allergies:  Review of patient's allergies indicates:   Allergen Reactions    Shellfish containing products Hives and Itching     Medications:   In-Hospital Scheduled Medications:     In-Hospital PRN Medications:     In-Hospital IV Infusion Medications:     Home Medications:  Prior to Admission medications    Medication Sig Start Date End Date Taking? Authorizing Provider   ferrous sulfate 325 (65 FE) MG EC tablet Take 1 tablet (325 mg total) by mouth every other day. 23   Jayshree Albarado DO   ibuprofen (ADVIL,MOTRIN) 600 MG tablet Take 1,200 mg by mouth 2 (two) times daily. 23   Historical Provider   medroxyPROGESTERone (PROVERA) 10 MG tablet Take 2 tablets (20 mg total) by mouth once daily. 23  Jayshree Albarado DO   methocarbamoL (ROBAXIN) 750 MG Tab Take 750 mg by mouth every evening. 23   Historical Provider       Family History:  Family History   Problem Relation Age of Onset    Hypertension Mother     No Known Problems Father        Social History:  Social History     Tobacco Use    Smoking status: Never    Smokeless tobacco: Never   Substance Use Topics    Alcohol use: Never    Drug use: Never        Objective:   Last 24 Hour Vital Signs:  BP  Min: 137/72  Max: 137/72  Temp  Av.3 °F (37.4 °C)  Min: 99.3 °F (37.4 °C)  Max: 99.3 °F (37.4 °C)  Pulse  Av  Min: 103  Max: 103  Resp  Av  Min: 16  Max: 16  SpO2  Av %  Min: 99 %  Max: 99 %  Height  Av' (152.4 cm)  Min: 5' (152.4 cm)  Max: 5' (152.4 cm)  Weight  Av.4 kg (252 lb 3.3 oz)  Min: 114.4 kg (252 lb 3.3 oz)  Max: 114.4 kg (252 lb 3.3 oz)  No intake/output data recorded.  Body mass index is 49.26 kg/m².    Physical Examination:  Vitals:    23 1236   BP: 137/72   Pulse: 103   Resp: 16   Temp: 99.3 °F (37.4 °C)   TempSrc: Oral   SpO2: 99%   Weight: 114.4 kg (252 lb 3.3 oz)   Height: 5' (1.524 m)       Body mass index is 49.26 kg/m².    Gen: Alert,  cooperative, no distress, appears stated age  CV: RRR  Chest: CTABL, no wheezes/rales/rhonchi  Abdomen: Soft, non-tender, bowel sounds active all four quadrants, no masses  Extrem: Extremities normal, atraumatic, no cyanosis or edema.  No calf tenderness or erythema.    Laboratory Results:  H/H 6.7/24.7    Radiology:  US Pelvis Comp with Transvag NON-OB (xpd    Result Date: 5/30/2023  EXAMINATION: US PELVIS COMP WITH TRANSVAG NON-OB (XPD) CLINICAL HISTORY: worsening vaginal bleeding, anemic, fibroids 6 months ago; TECHNIQUE: Transabdominal and endovaginal ultrasound of the pelvis. COMPARISON: Ultrasound 05/26/2022 FINDINGS: Poor overall uterine visualization due to decreased sonographic penetration.  Uterus is enlarged with at least 2 suspected fibroids on the transabdominal images.  These measure up to 4.2 and 6.4 cm.  Obscured endometrial stripe.  Ovaries not visualized.  No large volume pelvic ascites. Measurements: Uterus: 11.7 x 9.4 x 8.2 cm Right ovary: Obscured Left ovary: Obscured     1. Limited assessment.  Enlarged uterus with at least 2 suspected fibroids. 2. Ovaries not visualized. Electronically signed by: Puma Wise Date:    05/30/2023 Time:    15:04       Assessment:     Kely Sparks is a 52 y.o. female with AUB-L with critical H/H requiring transfusion. Asymptomatic.       Recommendations:     Agree with transfusion based on critical H/H  Patient previously w/u for AUB-L and hysterectomy for definitive surgical management with our clinic. Patient still not ready to pursue.   She would like to continue with medical management, not interested in restarting provera as she believes this worsened her symptoms. Counseled patient on proper use of medication and all other options including hormonal and nonhormonal. Patient elects for agygestin. Aygestin 5mg sent to patient's pharmacy. Will F/u outpatient. Message sent to schedulers for appointment request.    Discussed with staff,   [place]    Ronald Mckeon MD  [facility]  PGY-2

## 2023-05-30 NOTE — TELEPHONE ENCOUNTER
Informed patient her H/H is 6.7/25.2. Patient informed to go to ER for evaluation and treatment for anemia. Patient verbalized understanding of information given to her today.

## 2023-05-31 ENCOUNTER — OFFICE VISIT (OUTPATIENT)
Dept: INTERNAL MEDICINE | Facility: CLINIC | Age: 53
End: 2023-05-31
Payer: COMMERCIAL

## 2023-05-31 VITALS
WEIGHT: 249 LBS | HEIGHT: 61 IN | TEMPERATURE: 98 F | RESPIRATION RATE: 18 BRPM | HEART RATE: 74 BPM | DIASTOLIC BLOOD PRESSURE: 68 MMHG | BODY MASS INDEX: 47.01 KG/M2 | SYSTOLIC BLOOD PRESSURE: 120 MMHG

## 2023-05-31 DIAGNOSIS — D50.0 IRON DEFICIENCY ANEMIA DUE TO CHRONIC BLOOD LOSS: Primary | ICD-10-CM

## 2023-05-31 DIAGNOSIS — Z00.00 WELL ADULT EXAM: ICD-10-CM

## 2023-05-31 DIAGNOSIS — E66.01 OBESITY, CLASS III, BMI 40-49.9 (MORBID OBESITY): ICD-10-CM

## 2023-05-31 DIAGNOSIS — R31.9 HEMATURIA, UNSPECIFIED TYPE: ICD-10-CM

## 2023-05-31 LAB — PATH REV: NORMAL

## 2023-05-31 PROCEDURE — 99214 OFFICE O/P EST MOD 30 MIN: CPT | Mod: S$PBB,,, | Performed by: NURSE PRACTITIONER

## 2023-05-31 PROCEDURE — 99214 PR OFFICE/OUTPT VISIT, EST, LEVL IV, 30-39 MIN: ICD-10-PCS | Mod: S$PBB,,, | Performed by: NURSE PRACTITIONER

## 2023-05-31 PROCEDURE — 99214 OFFICE O/P EST MOD 30 MIN: CPT | Mod: PBBFAC | Performed by: NURSE PRACTITIONER

## 2023-05-31 NOTE — PROGRESS NOTES
Patient ID: [id number]     Chief Complaint: LAB RESULTS        HPI:     GIANLUCA Sparks is a 52 y.o. female here today for a follow up lab results.         ----------------------------  Abnormal Pap smear of cervix  Anemia     Past Surgical History:   Procedure Laterality Date     SECTION       SECTION      CHOLECYSTECTOMY      DILATION AND CURETTAGE OF UTERUS      HYSTEROSCOPY WITH DILATION AND CURETTAGE OF UTERUS N/A 2022    Procedure: HYSTEROSCOPY, WITH DILATION AND CURETTAGE OF UTERUS;  Surgeon: Susie Thibodeaux MD;  Location: [facility];  Service: OB/GYN;  Laterality: N/A;  **MYOSURE**    TUBAL LIGATION         Review of patient's allergies indicates:   Allergen Reactions    Shellfish containing products Hives and Itching       Current Outpatient Medications   Medication Instructions    ferrous sulfate 325 mg, Oral, 2 times daily    ibuprofen (ADVIL,MOTRIN) 1,200 mg, Oral, 2 times daily    medroxyPROGESTERone (PROVERA) 20 mg, Oral, Daily    methocarbamoL (ROBAXIN) 750 mg, Oral, Nightly       Social History     Socioeconomic History    Marital status: Single   Tobacco Use    Smoking status: Never    Smokeless tobacco: Never   Substance and Sexual Activity    Alcohol use: Never    Drug use: Never    Sexual activity: Not Currently     Partners: Male     Social Determinants of Health     Financial Resource Strain: High Risk    Difficulty of Paying Living Expenses: Hard   Food Insecurity: Food Insecurity Present    Worried About Running Out of Food in the Last Year: Sometimes true    Ran Out of Food in the Last Year: Sometimes true   Transportation Needs: No Transportation Needs    Lack of Transportation (Medical): No    Lack of Transportation (Non-Medical): No   Physical Activity: Sufficiently Active    Days of Exercise per Week: 3 days    Minutes of Exercise per Session: 60 min   Stress: Stress Concern Present    Feeling of Stress : Very much   Social Connections: Unknown     Frequency of Social Gatherings with Friends and Family: Twice a week    Attends Worship Services: More than 4 times per year    Active Member of Clubs or Organizations: No    Attends Club or Organization Meetings: Never   Housing Stability: High Risk    Unable to Pay for Housing in the Last Year: Yes    Number of Places Lived in the Last Year: 1    Unstable Housing in the Last Year: No        Family History   Problem Relation Age of Onset    Hypertension Mother     No Known Problems Father         Patient Care Team:  JUANA Tavares as PCP - General (Family Medicine)     Subjective:     Review of Systems     See HPI for details    Constitutional: Denies Change in appetite. Denies Chills. Denies Fever. Denies Night sweats.  Eye: Denies Blurred vision. Denies Discharge. Denies Eye pain.  ENT: Denies Decreased hearing. Denies Sore throat. Denies Swollen glands.  Respiratory: Denies Cough. Denies Shortness of breath. Denies Shortness of breath with exertion. Denies Wheezing.  Cardiovascular: DeniesChest pain at rest. Denies Chest pain with exertion. Denies Irregular heartbeat. Denies Palpitations. Denies Edema.  Gastrointestinal: Denies Abdominal pain. DeniesDiarrhea. Denies Nausea. Denies Vomiting. Denies Hematemesis or Hematochezia.  Genitourinary: Denies Dysuria. Denies Urinary frequency. Denies Urinary urgency. Denies Blood in urine.  Endocrine: Denies Cold intolerance. Denies Excessive thirst. Denies Heat intolerance. Denies Weight loss. Denies Weight gain.  Musculoskeletal: Denies Painful joints. Denies Weakness.  Integumentary: Denies Rash. Denies Itching. Denies Dry skin.  Neurologic: Denies Dizziness. Denies Fainting. Denies Headache.  Psychiatric: Denies Depression. Denies Anxiety. Denies Suicidal/Homicidal ideations.    All Other ROS: Negative except as stated in HPI.       Objective:     Visit Vitals  /68 (BP Location: Right arm, Patient Position: Sitting, BP Method: Large (Automatic))   Pulse  "74   Temp 98 °F (36.7 °C) (Oral)   Resp 18   Ht 5' 1" (1.549 m)   Wt 112.9 kg (249 lb)   BMI 47.05 kg/m²       Physical Exam    General: Alert and oriented, No acute distress.  Head: Normocephalic, Atraumatic.  Eye: Pupils are equal, round and reactive to light, Extraocular movements are intact, Sclera non-icteric.  Ears/Nose/Throat: Normal, Mucosa moist,Clear.  Neck/Thyroid: Supple, Non-tender, No carotid bruit, No lymphadenopathy, No JVD, Full range of motion.  Respiratory: Clear to auscultation bilaterally; No wheezes, rales or rhonchi,Non-labored respirations, Symmetrical chest wall expansion.  Cardiovascular: Regular rate and rhythm, S1/S2 normal, No murmurs, rubs or gallops.  Gastrointestinal: Soft, Non-tender, Non-distended, Normal bowel sounds, No palpable organomegaly.  Musculoskeletal: Normal range of motion.  Integumentary: Warm, Dry, Intact, No suspicious lesions or rashes.  Extremities: No clubbing, cyanosis or edema  Neurologic: No focal deficits, Cranial Nerves II-XII are grossly intact, Motor strength normal upper and lower extremities, Sensory exam intact.  Psychiatric: Normal interaction, Coherent speech, Euthymic mood, Appropriate affect       Labs Reviewed:     Chemistry:  Lab Results   Component Value Date     05/30/2023    K 4.1 05/30/2023    CHLORIDE 106 05/30/2023    BUN 13.9 05/30/2023    CREATININE 0.78 05/30/2023    EGFRNORACEVR >60 05/30/2023    GLUCOSE 79 05/30/2023    CALCIUM 9.5 05/30/2023    ALKPHOS 63 05/30/2023    LABPROT 7.8 05/30/2023    ALBUMIN 3.7 05/30/2023    AST 25 05/30/2023    ALT 16 05/30/2023    [id number] 24.4 (L) 01/21/2023        Lab Results   Component Value Date    HGBA1C 5.2 10/19/2022        Hematology:  Lab Results   Component Value Date    WBC 8.78 05/30/2023    HGB 7.3 (L) 05/30/2023    HCT 25.5 (L) 05/30/2023     05/30/2023       Lipid Panel:  Lab Results   Component Value Date    CHOL 139 10/19/2022    HDL 40 10/19/2022    LDL 93.00 10/19/2022 "    TRIG 29 (L) 10/19/2022    TOTALCHOLEST 3 10/19/2022        Urine:  Lab Results   Component Value Date    COLORUA Light-Yellow 05/30/2023    APPEARANCEUA Clear 05/30/2023    SGUA 1.019 05/30/2023    PHUA 5.5 05/30/2023    PROTEINUA Negative 05/30/2023    GLUCOSEUA Normal 05/30/2023    KETONESUA Negative 05/30/2023    BLOODUA 2+ (A) 05/30/2023    NITRITESUA Negative 05/30/2023    LEUKOCYTESUR Negative 05/30/2023    RBCUA 0-5 05/30/2023    WBCUA 0-5 05/30/2023    BACTERIA Trace (A) 05/30/2023    SQEPUA Trace (A) 05/30/2023    HYALINECASTS None Seen 05/30/2023        Assessment:       ICD-10-CM ICD-9-CM   1. Anemia  D50.0 280.0   2. Obesity, Class III, BMI 40-49.9 (morbid obesity)  E66.01 278.01   3. Well adult exam  Z00.00 V70.0   4. Hematuria, unspecified type  R31.9 599.70        Plan:     1. Anemia  Pt was seen in ED yesterday for H/H 6.7/24.7. Pt received 1 unit RBCs and refused 2nd unit due to feeling fine. Pt's iron was increase to BID. Will repeat CBC in 1 month. Cont Ferrous Sulfate 325 mg 1 tab po BID. Will refill on next appt. Pt asymptomatic.    2. Obesity, Class III, BMI 40-49.9 (morbid obesity)  Low fat diet and exercise. Education provided.     3. Well adult exam  Labs in 1 month and 3 months. Keep GYN appt when scheduled as they were consulted in ER and informed her they would try her on another progesterone medication as she was not tolerating Provera in the past. UTD MMG. Pt states she still has not received the cologuard testing supplies even after re-ordering the test. Pt's address and phone number verified in chart. Will contact [employer] concerning supplies.         Follow up in about 1 month (around 6/30/2023) for Telemed with lab 1 week prior to appt. . In addition to their scheduled follow up, the patient has also been instructed to follow up on as needed basis.     No future appointments.     Sera Villarreal, SOPHIAP

## 2023-06-02 RX ORDER — NORETHINDRONE 5 MG/1
5 TABLET ORAL DAILY
Qty: 30 TABLET | Refills: 11 | Status: ON HOLD | OUTPATIENT
Start: 2023-06-02 | End: 2023-07-31 | Stop reason: HOSPADM

## 2023-06-12 ENCOUNTER — TELEPHONE (OUTPATIENT)
Dept: GYNECOLOGY | Facility: CLINIC | Age: 53
End: 2023-06-12
Payer: COMMERCIAL

## 2023-06-12 NOTE — TELEPHONE ENCOUNTER
Please call patient and let her know we set up her gyn follow up with us on 7/17/23 at 9:45 am. Thank you.       ----- Message from Ronald Mckeon MD sent at 6/9/2023  5:30 AM CDT -----  Regarding: Appointment request  Good morning,     Please schedule this patient for next available appointment for ED follow up of AUB-L.    Thank you    Ronald Mckeon MD  [facility]  PGY-2

## 2023-06-28 DIAGNOSIS — D50.0 IRON DEFICIENCY ANEMIA DUE TO CHRONIC BLOOD LOSS: Primary | ICD-10-CM

## 2023-07-23 ENCOUNTER — HOSPITAL ENCOUNTER (INPATIENT)
Facility: HOSPITAL | Age: 53
LOS: 8 days | Discharge: HOME | DRG: 175 | End: 2023-07-31
Attending: EMERGENCY MEDICINE | Admitting: INTERNAL MEDICINE
Payer: COMMERCIAL

## 2023-07-23 DIAGNOSIS — I26.99 PULMONARY EMBOLISM, UNSPECIFIED CHRONICITY, UNSPECIFIED PULMONARY EMBOLISM TYPE, UNSPECIFIED WHETHER ACUTE COR PULMONALE PRESENT: ICD-10-CM

## 2023-07-23 DIAGNOSIS — D50.0 IRON DEFICIENCY ANEMIA DUE TO CHRONIC BLOOD LOSS: ICD-10-CM

## 2023-07-23 DIAGNOSIS — I26.99 ACUTE PULMONARY EMBOLISM, UNSPECIFIED PULMONARY EMBOLISM TYPE, UNSPECIFIED WHETHER ACUTE COR PULMONALE PRESENT: ICD-10-CM

## 2023-07-23 DIAGNOSIS — R50.9 FEVER, UNSPECIFIED: ICD-10-CM

## 2023-07-23 DIAGNOSIS — N93.9 ABNORMAL UTERINE BLEEDING (AUB): ICD-10-CM

## 2023-07-23 DIAGNOSIS — R06.09 DYSPNEA ON EXERTION: ICD-10-CM

## 2023-07-23 DIAGNOSIS — R06.02 SHORTNESS OF BREATH: Primary | ICD-10-CM

## 2023-07-23 DIAGNOSIS — D64.9 SYMPTOMATIC ANEMIA: ICD-10-CM

## 2023-07-23 DIAGNOSIS — N93.8 DUB (DYSFUNCTIONAL UTERINE BLEEDING): ICD-10-CM

## 2023-07-23 DIAGNOSIS — Z01.818 PREOPERATIVE EXAM FOR GYNECOLOGIC SURGERY: ICD-10-CM

## 2023-07-23 LAB
ALBUMIN SERPL-MCNC: 3.2 G/DL (ref 3.5–5)
ALBUMIN/GLOB SERPL: 0.8 RATIO (ref 1.1–2)
ALP SERPL-CCNC: 52 UNIT/L (ref 40–150)
ALT SERPL-CCNC: 28 UNIT/L (ref 0–55)
ANISOCYTOSIS BLD QL SMEAR: ABNORMAL
AST SERPL-CCNC: 38 UNIT/L (ref 5–34)
BASOPHILS # BLD AUTO: 0.03 X10(3)/MCL
BASOPHILS NFR BLD AUTO: 0.2 %
BILIRUBIN DIRECT+TOT PNL SERPL-MCNC: 0.7 MG/DL
BNP BLD-MCNC: 57.3 PG/ML
BUN SERPL-MCNC: 9.2 MG/DL (ref 9.8–20.1)
CALCIUM SERPL-MCNC: 8.7 MG/DL (ref 8.4–10.2)
CHLORIDE SERPL-SCNC: 105 MMOL/L (ref 98–107)
CO2 SERPL-SCNC: 20 MMOL/L (ref 22–29)
CREAT SERPL-MCNC: 0.93 MG/DL (ref 0.55–1.02)
D DIMER PPP IA.FEU-MCNC: 4.81 UG/ML FEU (ref 0–0.5)
ELLIPTOCYTOSIS (OHS): SLIGHT
EOSINOPHIL # BLD AUTO: 0.06 X10(3)/MCL (ref 0–0.9)
EOSINOPHIL NFR BLD AUTO: 0.4 %
ERYTHROCYTE [DISTWIDTH] IN BLOOD BY AUTOMATED COUNT: 19 % (ref 11.5–17)
FLUAV AG UPPER RESP QL IA.RAPID: NOT DETECTED
FLUBV AG UPPER RESP QL IA.RAPID: NOT DETECTED
GFR SERPLBLD CREATININE-BSD FMLA CKD-EPI: >60 MLS/MIN/1.73/M2
GLOBULIN SER-MCNC: 4.2 GM/DL (ref 2.4–3.5)
GLUCOSE SERPL-MCNC: 101 MG/DL (ref 74–100)
GROUP & RH: NORMAL
HCT VFR BLD AUTO: 19.8 % (ref 37–47)
HCT VFR BLD AUTO: 24.5 % (ref 37–47)
HGB BLD-MCNC: 5.3 G/DL (ref 12–16)
HGB BLD-MCNC: 6.6 G/DL (ref 12–16)
HYPOCHROMIA BLD QL SMEAR: ABNORMAL
IMM GRANULOCYTES # BLD AUTO: 0.06 X10(3)/MCL (ref 0–0.04)
IMM GRANULOCYTES NFR BLD AUTO: 0.4 %
INDIRECT COOMBS GEL: NORMAL
LYMPHOCYTES # BLD AUTO: 1.25 X10(3)/MCL (ref 0.6–4.6)
LYMPHOCYTES NFR BLD AUTO: 8.4 %
MACROCYTES BLD QL SMEAR: SLIGHT
MCH RBC QN AUTO: 17.8 PG (ref 27–31)
MCHC RBC AUTO-ENTMCNC: 26.8 G/DL (ref 33–36)
MCV RBC AUTO: 66.7 FL (ref 80–94)
MICROCYTES BLD QL SMEAR: ABNORMAL
MONOCYTES # BLD AUTO: 1.84 X10(3)/MCL (ref 0.1–1.3)
MONOCYTES NFR BLD AUTO: 12.4 %
NEUTROPHILS # BLD AUTO: 11.62 X10(3)/MCL (ref 2.1–9.2)
NEUTROPHILS NFR BLD AUTO: 78.2 %
NRBC BLD AUTO-RTO: 0.1 %
OVALOCYTES (OLG): SLIGHT
PLATELET # BLD AUTO: 269 X10(3)/MCL (ref 130–400)
PLATELET # BLD EST: NORMAL 10*3/UL
PMV BLD AUTO: 12 FL (ref 7.4–10.4)
POLYCHROMASIA BLD QL SMEAR: ABNORMAL
POTASSIUM SERPL-SCNC: 3 MMOL/L (ref 3.5–5.1)
PROT SERPL-MCNC: 7.4 GM/DL (ref 6.4–8.3)
RBC # BLD AUTO: 2.97 X10(6)/MCL (ref 4.2–5.4)
RBC MORPH BLD: ABNORMAL
SARS-COV-2 RNA RESP QL NAA+PROBE: NOT DETECTED
SODIUM SERPL-SCNC: 137 MMOL/L (ref 136–145)
SPECIMEN OUTDATE: NORMAL
TROPONIN I SERPL-MCNC: 0.01 NG/ML (ref 0–0.04)
WBC # SPEC AUTO: 14.86 X10(3)/MCL (ref 4.5–11.5)

## 2023-07-23 PROCEDURE — 11000001 HC ACUTE MED/SURG PRIVATE ROOM

## 2023-07-23 PROCEDURE — 21400001 HC TELEMETRY ROOM

## 2023-07-23 PROCEDURE — 86900 BLOOD TYPING SEROLOGIC ABO: CPT | Performed by: EMERGENCY MEDICINE

## 2023-07-23 PROCEDURE — 85014 HEMATOCRIT: CPT | Performed by: STUDENT IN AN ORGANIZED HEALTH CARE EDUCATION/TRAINING PROGRAM

## 2023-07-23 PROCEDURE — 25000003 PHARM REV CODE 250: Performed by: STUDENT IN AN ORGANIZED HEALTH CARE EDUCATION/TRAINING PROGRAM

## 2023-07-23 PROCEDURE — 0240U COVID/FLU A&B PCR: CPT | Performed by: EMERGENCY MEDICINE

## 2023-07-23 PROCEDURE — 63600175 PHARM REV CODE 636 W HCPCS: Performed by: STUDENT IN AN ORGANIZED HEALTH CARE EDUCATION/TRAINING PROGRAM

## 2023-07-23 PROCEDURE — 86920 COMPATIBILITY TEST SPIN: CPT | Performed by: EMERGENCY MEDICINE

## 2023-07-23 PROCEDURE — 99285 EMERGENCY DEPT VISIT HI MDM: CPT | Mod: 25

## 2023-07-23 PROCEDURE — 84484 ASSAY OF TROPONIN QUANT: CPT | Performed by: EMERGENCY MEDICINE

## 2023-07-23 PROCEDURE — P9016 RBC LEUKOCYTES REDUCED: HCPCS | Performed by: EMERGENCY MEDICINE

## 2023-07-23 PROCEDURE — 85379 FIBRIN DEGRADATION QUANT: CPT | Performed by: EMERGENCY MEDICINE

## 2023-07-23 PROCEDURE — 87040 BLOOD CULTURE FOR BACTERIA: CPT | Performed by: STUDENT IN AN ORGANIZED HEALTH CARE EDUCATION/TRAINING PROGRAM

## 2023-07-23 PROCEDURE — 83880 ASSAY OF NATRIURETIC PEPTIDE: CPT | Performed by: EMERGENCY MEDICINE

## 2023-07-23 PROCEDURE — 80053 COMPREHEN METABOLIC PANEL: CPT | Performed by: EMERGENCY MEDICINE

## 2023-07-23 PROCEDURE — 85025 COMPLETE CBC W/AUTO DIFF WBC: CPT | Performed by: EMERGENCY MEDICINE

## 2023-07-23 PROCEDURE — 86920 COMPATIBILITY TEST SPIN: CPT

## 2023-07-23 PROCEDURE — 93005 ELECTROCARDIOGRAM TRACING: CPT

## 2023-07-23 RX ORDER — TALC
6 POWDER (GRAM) TOPICAL NIGHTLY PRN
Status: DISCONTINUED | OUTPATIENT
Start: 2023-07-23 | End: 2023-07-31 | Stop reason: HOSPADM

## 2023-07-23 RX ORDER — SODIUM CHLORIDE 0.9 % (FLUSH) 0.9 %
10 SYRINGE (ML) INJECTION
Status: DISCONTINUED | OUTPATIENT
Start: 2023-07-23 | End: 2023-07-31 | Stop reason: HOSPADM

## 2023-07-23 RX ORDER — DIPHENHYDRAMINE HYDROCHLORIDE 50 MG/ML
25 INJECTION INTRAMUSCULAR; INTRAVENOUS ONCE
Status: DISCONTINUED | OUTPATIENT
Start: 2023-07-23 | End: 2023-07-23

## 2023-07-23 RX ORDER — LABETALOL HCL 20 MG/4 ML
10 SYRINGE (ML) INTRAVENOUS EVERY 4 HOURS PRN
Status: DISCONTINUED | OUTPATIENT
Start: 2023-07-23 | End: 2023-07-31 | Stop reason: HOSPADM

## 2023-07-23 RX ORDER — DIPHENHYDRAMINE HYDROCHLORIDE 50 MG/ML
25 INJECTION INTRAMUSCULAR; INTRAVENOUS ONCE
Status: COMPLETED | OUTPATIENT
Start: 2023-07-23 | End: 2023-07-24

## 2023-07-23 RX ORDER — HYDROCODONE BITARTRATE AND ACETAMINOPHEN 500; 5 MG/1; MG/1
TABLET ORAL
Status: DISCONTINUED | OUTPATIENT
Start: 2023-07-23 | End: 2023-07-24

## 2023-07-23 RX ORDER — NORETHINDRONE 5 MG/1
5 TABLET ORAL DAILY
Status: DISCONTINUED | OUTPATIENT
Start: 2023-07-24 | End: 2023-07-27

## 2023-07-23 RX ORDER — ONDANSETRON 2 MG/ML
4 INJECTION INTRAMUSCULAR; INTRAVENOUS EVERY 8 HOURS PRN
Status: DISCONTINUED | OUTPATIENT
Start: 2023-07-23 | End: 2023-07-31 | Stop reason: HOSPADM

## 2023-07-23 RX ORDER — NORETHINDRONE 5 MG/1
5 TABLET ORAL DAILY
Status: DISCONTINUED | OUTPATIENT
Start: 2023-07-24 | End: 2023-07-23

## 2023-07-23 RX ORDER — POTASSIUM CHLORIDE 20 MEQ/1
40 TABLET, EXTENDED RELEASE ORAL ONCE
Status: COMPLETED | OUTPATIENT
Start: 2023-07-23 | End: 2023-07-23

## 2023-07-23 RX ORDER — ACETAMINOPHEN 325 MG/1
650 TABLET ORAL EVERY 8 HOURS PRN
Status: DISCONTINUED | OUTPATIENT
Start: 2023-07-23 | End: 2023-07-31 | Stop reason: HOSPADM

## 2023-07-23 RX ORDER — LANOLIN ALCOHOL/MO/W.PET/CERES
1 CREAM (GRAM) TOPICAL DAILY
Status: DISCONTINUED | OUTPATIENT
Start: 2023-07-24 | End: 2023-07-24

## 2023-07-23 RX ORDER — POTASSIUM CHLORIDE 7.45 MG/ML
40 INJECTION INTRAVENOUS ONCE
Status: COMPLETED | OUTPATIENT
Start: 2023-07-23 | End: 2023-07-24

## 2023-07-23 RX ADMIN — PIPERACILLIN AND TAZOBACTAM 4.5 G: 4; .5 INJECTION, POWDER, LYOPHILIZED, FOR SOLUTION INTRAVENOUS; PARENTERAL at 08:07

## 2023-07-23 RX ADMIN — SODIUM CHLORIDE 1000 ML: 9 INJECTION, SOLUTION INTRAVENOUS at 06:07

## 2023-07-23 RX ADMIN — ACETAMINOPHEN 650 MG: 325 TABLET, FILM COATED ORAL at 05:07

## 2023-07-23 RX ADMIN — POTASSIUM CHLORIDE 40 MEQ: 1500 TABLET, EXTENDED RELEASE ORAL at 05:07

## 2023-07-23 RX ADMIN — VANCOMYCIN HYDROCHLORIDE 750 MG: 750 INJECTION, POWDER, LYOPHILIZED, FOR SOLUTION INTRAVENOUS at 09:07

## 2023-07-23 NOTE — CONSULTS
[facility] Consult Note    Subjective:      History of Present Illness:  Kely Sparks is a 52 y.o.  who presented to [facility] ED due to shortness of breath. Patient reports this started a few days ago. She denies any significant chest pain, lightheadedness or dizziness. Was feeling okay until arriving to the ED when she began having a fever. Denies nausea or vomiting or any other complaints. She is not currently having vaginal bleeding and has not. She had a 7 day perior starting  and no bleeding since then.    Upon arrival to the ED had a temperature and was also to have an elevated D-Dimer currently admitted to medicine for shortness of breaht with GYN consulted for symptomatic anemia presumed to be 2/2 known AUB-L    In terms of h/o AUB-L/M (per chart review)  2015: followed with [facility] GYN and had EMB demonstrating  endoemtrial polyp with focal atypical hyperplasia  2015: D&C  with curettings notable for secretory endometrium, subsequently had Mirena IUD placed which fell out in 2020: represented requiring blood transfusion, was worked up for hysterectomy and unable to do in office EMB 2/2 obstructing fibroids, taken for [facility] D&C showing large fibroids, following this also did not want definitive management, EMB at time benign endometrium and endocervix  2023: Additional transfusion, reports has stopped provera, switched to Aygestin again declined surgical management, was sent home with aygestin. She has taken 5mg aygestin daily and has only had one period which she remarks was as heavy as prior.    Review of Systems:  Pertinent items are noted in HPI. All other systems are reviewed and are negative.    Past Medical History:  Past Medical History:   Diagnosis Date    Abnormal Pap smear of cervix     Anemia     Chronic back pain     Chronic hypertension     Obesity, Class III, BMI 40-49.9 (morbid obesity)        Past Surgical History:  Past Surgical History:   Procedure Laterality  Date     SECTION      x2    CHOLECYSTECTOMY      HYSTEROSCOPY WITH DILATION AND CURETTAGE OF UTERUS N/A 2022    Procedure: HYSTEROSCOPY, WITH DILATION AND CURETTAGE OF UTERUS;  Surgeon: Susie Thibodeaux MD;  Location: [facility];  Service: OB/GYN;  Laterality: N/A;  **MYOSURE**    TUBAL LIGATION         Obstetrical History:  OB History    Para Term  AB Living   2 2 2     2   SAB IAB Ectopic Multiple Live Births           2      # Outcome Date GA Lbr Kevin/2nd Weight Sex Delivery Anes PTL Lv   2 Term     M CS-LTranv  N LESLIE   1 Term     F CS-LTranv  N LESLIE      Obstetric Comments   BB 8lb 4oz   Triad:        Gynecologic History:   Triad:   LMP: 2022; has had intermittent spotting since  Contraception: BTL  Perimenopausal; FSH 41  Remote hx trichomonas and chlamydia, s/p tx  Remote hx abnorma pap; denies biopsy or excisional procedure      Allergies:  Review of patient's allergies indicates:   Allergen Reactions    Shellfish containing products Hives and Itching     Medications:   In-Hospital Scheduled Medications:     In-Hospital PRN Medications:     In-Hospital IV Infusion Medications:     Home Medications:  Current Outpatient Medications   Medication Instructions    ferrous sulfate 325 mg, Oral, 2 times daily    ibuprofen (ADVIL,MOTRIN) 1,200 mg, Oral, 2 times daily    medroxyPROGESTERone (PROVERA) 20 mg, Oral, Daily    methocarbamoL (ROBAXIN) 750 mg, Oral, Nightly    norethindrone (AYGESTIN) 5 mg, Oral, Daily        Family History:  Family History   Problem Relation Age of Onset    Hypertension Mother     No Known Problems Father        Social History:  Social History     Tobacco Use    Smoking status: Never    Smokeless tobacco: Never   Substance Use Topics    Alcohol use: Never    Drug use: Never        Objective:   Last 24 Hour Vital Signs:  BP  Min: 122/70  Max: 152/73  Temp  Av.9 °F (38.3 °C)  Min: 99.5 °F (37.5 °C)  Max: 102.2 °F (39 °C)  Pulse  Av.2   Min: 91  Max: 115  Resp  Av.3  Min: 19  Max: 36  SpO2  Av.6 %  Min: 96 %  Max: 100 %  Height  Av' (152.4 cm)  Min: 5' (152.4 cm)  Max: 5' (152.4 cm)  Weight  Av.3 kg (251 lb 15.8 oz)  Min: 114.3 kg (251 lb 15.8 oz)  Max: 114.3 kg (251 lb 15.8 oz)  No intake/output data recorded.  Body mass index is 49.21 kg/m².    Physical Examination:  Vitals:    23 1738 23 1812 23 1837 23 1938   BP: (!) (P) 146/77 (!) 151/77  139/84   Pulse: (P) 105 100  100   Resp: (!) (P) 22 (!) 26  20   Temp: (!) (P) 102 °F (38.9 °C) (!) 102.2 °F (39 °C) (!) 101 °F (38.3 °C) 99.8 °F (37.7 °C)   TempSrc: (P) Oral Oral  Oral   SpO2: (P) 100% 100%  96%   Weight:       Height:           Body mass index is 49.21 kg/m².    Gen: Alert, oriented though shivering at the time.   CV: RRR  Chest: CTABL, no wheezes/rales/rhonchi  Abdomen: Soft, non-tender, bowel sounds active all four quadrants, no masses  Incision: well healed pfannensteil  Extrem: Extremities normal, atraumatic, no cyanosis. Taught skin with non-pitting edema in bilateral lower extremities      LABS  Trended:  Recent Labs   Lab 23  1457   WBC 14.86*   HGB 5.3*   HCT 19.8*      MCV 66.7*   RDW 19.0*      K 3.0*   CO2 20*   BUN 9.2*   CREATININE 0.93   CALCIUM 8.7   ALBUMIN 3.2*   BILITOT 0.7   AST 38*   ALT 28   ALKPHOS 52     Recent Results (from the past 24 hour(s))   Comprehensive metabolic panel    Collection Time: 23  2:57 PM   Result Value Ref Range    Sodium Level 137 136 - 145 mmol/L    Potassium Level 3.0 (L) 3.5 - 5.1 mmol/L    Chloride 105 98 - 107 mmol/L    Carbon Dioxide 20 (L) 22 - 29 mmol/L    Glucose Level 101 (H) 74 - 100 mg/dL    Blood Urea Nitrogen 9.2 (L) 9.8 - 20.1 mg/dL    Creatinine 0.93 0.55 - 1.02 mg/dL    Calcium Level Total 8.7 8.4 - 10.2 mg/dL    Protein Total 7.4 6.4 - 8.3 gm/dL    Albumin Level 3.2 (L) 3.5 - 5.0 g/dL    Globulin 4.2 (H) 2.4 - 3.5 gm/dL    Albumin/Globulin Ratio 0.8 (L) 1.1 -  2.0 ratio    Bilirubin Total 0.7 <=1.5 mg/dL    Alkaline Phosphatase 52 40 - 150 unit/L    Alanine Aminotransferase 28 0 - 55 unit/L    Aspartate Aminotransferase 38 (H) 5 - 34 unit/L    eGFR >60 mls/min/1.73/m2   Brain natriuretic peptide    Collection Time: 07/23/23  2:57 PM   Result Value Ref Range    Natriuretic Peptide 57.3 <=100.0 pg/mL   Troponin I    Collection Time: 07/23/23  2:57 PM   Result Value Ref Range    Troponin-I 0.012 0.000 - 0.045 ng/mL   CBC with Differential    Collection Time: 07/23/23  2:57 PM   Result Value Ref Range    WBC 14.86 (H) 4.50 - 11.50 x10(3)/mcL    RBC 2.97 (L) 4.20 - 5.40 x10(6)/mcL    Hgb 5.3 (LL) 12.0 - 16.0 g/dL    Hct 19.8 (LL) 37.0 - 47.0 %    MCV 66.7 (L) 80.0 - 94.0 fL    MCH 17.8 (L) 27.0 - 31.0 pg    MCHC 26.8 (L) 33.0 - 36.0 g/dL    RDW 19.0 (H) 11.5 - 17.0 %    Platelet 269 130 - 400 x10(3)/mcL    MPV 12.0 (H) 7.4 - 10.4 fL    Neut % 78.2 %    Lymph % 8.4 %    Mono % 12.4 %    Eos % 0.4 %    Basophil % 0.2 %    Lymph # 1.25 0.6 - 4.6 x10(3)/mcL    Neut # 11.62 (H) 2.1 - 9.2 x10(3)/mcL    Mono # 1.84 (H) 0.1 - 1.3 x10(3)/mcL    Eos # 0.06 0 - 0.9 x10(3)/mcL    Baso # 0.03 <=0.2 x10(3)/mcL    IG# 0.06 (H) 0 - 0.04 x10(3)/mcL    IG% 0.4 %    NRBC% 0.1 %   Blood Smear Microscopic Exam    Collection Time: 07/23/23  2:57 PM   Result Value Ref Range    RBC Morph Abnormal (A) Normal    Anisocytosis 2+ (A) (none)    Elliptocytosis Slight (A) (none)    Hypochromasia 1+ (A) (none)    Macrocytosis Slight (A) (none)    Microcytosis 2+ (A) (none)    Ovalocytes Slight (A) (none)    Polychromasia 2+ (A) (none)    Platelets Normal Normal, Adequate   Prepare RBC 3 Units; Symptomatic anemia    Collection Time: 07/23/23  2:57 PM   Result Value Ref Range    UNIT NUMBER [id number]     UNIT ABO/RH O POS     DISPENSE STATUS Issued     Unit Expiration [id number]     Product Code C4498C45     Unit Blood Type Code 5100     CROSSMATCH INTERPRETATION Compatible     UNIT NUMBER [id number]      UNIT ABO/RH O POS     DISPENSE STATUS Issued     Unit Expiration [id number]     Product Code J8682G32     Unit Blood Type Code 5100     CROSSMATCH INTERPRETATION Compatible     UNIT NUMBER [id number]     UNIT ABO/RH O POS     DISPENSE STATUS Selected     Unit Expiration [id number]     Product Code J6398J36     Unit Blood Type Code 5100     CROSSMATCH INTERPRETATION Compatible    Type & Screen    Collection Time: 07/23/23  2:57 PM   Result Value Ref Range    Group & Rh O POS     Indirect Glenda GEL NEG     Specimen Outdate 07/26/2023 23:59    COVID/FLU A&B PCR    Collection Time: 07/23/23  3:22 PM   Result Value Ref Range    Influenza A PCR Not Detected Not Detected    Influenza B PCR Not Detected Not Detected    SARS-CoV-2 PCR Not Detected Not Detected, Negative, Invalid   D dimer, quantitative    Collection Time: 07/23/23  3:45 PM   Result Value Ref Range    D-Dimer 4.81 (H) 0.00 - 0.50 ug/mL FEU        Radiology:  US Pelvis Comp with Transvag NON-OB (xpd    Result Date: 5/30/2023  EXAMINATION: US PELVIS COMP WITH TRANSVAG NON-OB (XPD) CLINICAL HISTORY: worsening vaginal bleeding, anemic, fibroids 6 months ago; TECHNIQUE: Transabdominal and endovaginal ultrasound of the pelvis. COMPARISON: Ultrasound 05/26/2022 FINDINGS: Poor overall uterine visualization due to decreased sonographic penetration.  Uterus is enlarged with at least 2 suspected fibroids on the transabdominal images.  These measure up to 4.2 and 6.4 cm.  Obscured endometrial stripe.  Ovaries not visualized.  No large volume pelvic ascites. Measurements: Uterus: 11.7 x 9.4 x 8.2 cm Right ovary: Obscured Left ovary: Obscured     1. Limited assessment.  Enlarged uterus with at least 2 suspected fibroids. 2. Ovaries not visualized. Electronically signed by: Puma Wise Date:    05/30/2023 Time:    15:04       Assessment:     Kely Sparks is a 52 y.o. female admitted to medicine for shortness of breath work up, also with anemia  thought to be 2/2 AUB-L       Recommendations:     Agree with transfusion based on critical H/H, continue work up of anemia and shortness of breath per internal medicine to ensure no other etiology of her anemia  Now also meeting sepsis criteria with only symptom of shortness of breath, continue management per primar  Patient previously w/u for AUB-L and hysterectomy for definitive surgical management with our clinic.   EMB 6/2022 demonstrating benign strips of endometrial and endocervical tissue  Pap 6/2022 NILM/HPV-  She has been on aygestin for the past 6 weeks and continues to need blood transfusion from heavy menses (dropped from H/H 7.3/25.5 --> 5.3/19.8 with one  period)  Given that patient is continuing to have heavy bleeding requiring tranfusion, may reconsider re-biopsing though this can be performed outpatient   Will also re-discuss in AM further management, if VTE negative can consider increasing aygestin and continue to recommend surgical management , if VTE positive will have risk/benefit discussion due to theoretical risk of aygestin with VTE (though with low dosing should not be problematic)     Discussed with staff, Dr. Jonas Castellano  [facility], PGY4          (Aware/agree with above-[facility])

## 2023-07-23 NOTE — ED PROVIDER NOTES
Encounter Date: 2023       History     Chief Complaint   Patient presents with    Shortness of Breath     Shortness of breath for a few days hx of anemia      Patient presenting from home with acute onset of shortness of breath after getting off work Friday, she states it is worse with activity, and she also notices bilateral ankle swelling.  Patient states she has no sick contacts, she does not smoke or drink, she is followed by Dr. Sera Villarreal in the Internal Medicine [facility], and her past medical history is dysfunctional uterine bleeding which causes anemia    The history is provided by the patient. No  was used.   Shortness of Breath  This is a new problem. The average episode lasts 2 days. The problem occurs intermittently.The current episode started 2 days ago. The problem has been gradually worsening. Associated symptoms include leg swelling.   Review of patient's allergies indicates:   Allergen Reactions    Shellfish containing products Hives and Itching     Past Medical History:   Diagnosis Date    Abnormal Pap smear of cervix     Anemia      Past Surgical History:   Procedure Laterality Date     SECTION       SECTION      CHOLECYSTECTOMY      DILATION AND CURETTAGE OF UTERUS      HYSTEROSCOPY WITH DILATION AND CURETTAGE OF UTERUS N/A 2022    Procedure: HYSTEROSCOPY, WITH DILATION AND CURETTAGE OF UTERUS;  Surgeon: Susie Thibodeaux MD;  Location: [facility];  Service: OB/GYN;  Laterality: N/A;  **MYOSURE**    TUBAL LIGATION       Family History   Problem Relation Age of Onset    Hypertension Mother     No Known Problems Father      Social History     Tobacco Use    Smoking status: Never    Smokeless tobacco: Never   Substance Use Topics    Alcohol use: Never    Drug use: Never     Review of Systems   Respiratory:  Positive for shortness of breath.    Cardiovascular:  Positive for leg swelling.   All other systems reviewed and are  negative.    Physical Exam     Initial Vitals [07/23/23 1426]   BP Pulse Resp Temp SpO2   133/73 (!) 115 (!) 24 99.5 °F (37.5 °C) 100 %      MAP       --         Physical Exam    Vitals reviewed.  Constitutional: She appears well-developed and well-nourished.   Eyes: EOM are normal. Pupils are equal, round, and reactive to light.   Neck:   Normal range of motion.  Cardiovascular:  Regular rhythm.           Tachycardia   Pulmonary/Chest: Breath sounds normal.   Abdominal: Abdomen is soft. Bowel sounds are normal.   Musculoskeletal:      Cervical back: Normal range of motion.     Neurological: She is alert and oriented to person, place, and time.   Skin: Skin is warm and dry.   Bilateral lower extremity edema       ED Course   Procedures  Labs Reviewed   CBC W/ AUTO DIFFERENTIAL    Narrative:     The following orders were created for panel order CBC auto differential.  Procedure                               Abnormality         Status                     ---------                               -----------         ------                     CBC with Differential[[id number]]                            In process                   Please view results for these tests on the individual orders.   COMPREHENSIVE METABOLIC PANEL   COVID/FLU A&B PCR   B-TYPE NATRIURETIC PEPTIDE   TROPONIN I   CBC WITH DIFFERENTIAL   EXTRA TUBES    Narrative:     The following orders were created for panel order EXTRA TUBES.  Procedure                               Abnormality         Status                     ---------                               -----------         ------                     Light Blue Top Hold[[id number]]                              In process                 Red Top Hold[[id number]]                                     In process                 Red Top Hold[[id number]]                                     In process                 Gold Top Hold[[id number]]                                    In process                 Pink Top  Hold[[id number]]                                    In process                   Please view results for these tests on the individual orders.   LIGHT BLUE TOP HOLD   RED TOP HOLD   RED TOP HOLD   GOLD TOP HOLD   PINK TOP HOLD   D DIMER, QUANTITATIVE     EKG Readings: (Independently Interpreted)   EKG July 23, 2023, time 14:24, rate 106, sinus tachycardia, normal axis, normal intervals, no STEMI   ECG Results              EKG 12-lead (In process)  Result time 07/23/23 14:44:12      In process by Interface, Lab In [facility] (07/23/23 14:44:12)                   Narrative:    Test Reason : R06.02,    Vent. Rate : 106 BPM     Atrial Rate : 106 BPM     P-R Int : 134 ms          QRS Dur : 080 ms      QT Int : 342 ms       P-R-T Axes : 058 026 031 degrees     QTc Int : 454 ms    Sinus tachycardia  Cannot rule out Anterior infarct ,age undetermined  Abnormal ECG  No previous ECGs available    Referred By: [email or web address]   SELF           Confirmed By:                                   Imaging Results    None          Medications - No data to display  Medical Decision Making:   Initial Assessment:   Shortness of breath  Differential Diagnosis:   Pneumonia, cardiac, infectious, PE, symptomatic anemia  ED Management:  Patient's presentation with history of chronic anemia associated with dysfunctional uterine bleeding a full workup was performed cardiac was negative, her H&H came back at 5 and 14, indicating that patient shortness of breath is likely due to symptomatic anemia, Hospital Medicine was consulted for admission, and gyn was consulted for dysfunctional uterine bleeding  Other:   I have discussed this case with another health care provider.       <> Summary of the Discussion: See above ED management                        Clinical Impression:   Final diagnoses:  [R06.02] Shortness of breath               Edward Baca MD  07/23/23 [id number]

## 2023-07-23 NOTE — CARE UPDATE
Received call from nursing reporting patient spiking temperatures of 100.7, 101.3, 102.2 with possible blood in urine. UA sent for analysis. Immediately went to evaluate patient. Patient appeared comfortable and was hemodynamically stable. She was asymptomatic and denied any acute complaints. Instructed nursing to place a second peripheral IV for concurrent blood and fluid administration. Will continue to monitor vitals and H/H.

## 2023-07-23 NOTE — H&P
[facility] Medicine Wards   History & Physical Note     Resident Team: [facility] Medicine List 3  Attending Physician: Destiny Alba MD  Resident: Lashanda Moran DO  Date of Admit: 2023    Chief Complaint:     Shortness of Breath (Shortness of breath for a few days hx of anemia )      Subjective:      History of Present Illness:  Kely Sparks is a 52 y.o.  female with a history of abnormal uterine bleeding who presented to [facility] ED on 2023  with complaint of feeling short of breath.  Patient reports that she began feeling short of breath on 2023.  She reports that she is a history of symptomatic anemia secondary to abnormal uterine bleeding.  She reports her last menstrual cycle was on  with heavy flow and multiple clots.  She reports that her cycle completed at the beginning of July however she is been spotting since then.  She reports no abnormal uterine discharge, foul odors, rashes, hematuria, hematemesis, hematochezia, melena, bright red blood per rectum, bruises, hemoptysis.  She does report ongoing dyspnea on exertion since onset of symptoms, and reports bilateral lower extremity swelling.  Reports no chest pains, no palpitations, no fevers, no chills, no sick contacts, no trauma, no abdominal pain, no nausea, no vomiting, no diarrhea.  Has no other complaints at this time.  In the ED, found to have H/H of 5.3/19.8.  [facility] was consulted for admission.      Past Medical History:   has a past medical history of Abnormal Pap smear of cervix and Anemia.     Past Surgical History:   has a past surgical history that includes  section; Cholecystectomy;  section; Dilation and curettage of uterus; Tubal ligation; and Hysteroscopy with dilation and curettage of uterus (N/A, 2022).     Family History:  family history includes Hypertension in her mother; No Known Problems in her father.     Social History:   reports that she has never smoked. She has never  used smokeless tobacco. She reports that she does not drink alcohol and does not use drugs.     Allergies:  is allergic to shellfish containing products.     Home Medications:  Current Outpatient Medications   Medication Instructions    ferrous sulfate 325 mg, Oral, 2 times daily    ibuprofen (ADVIL,MOTRIN) 1,200 mg, Oral, 2 times daily    medroxyPROGESTERone (PROVERA) 20 mg, Oral, Daily    methocarbamoL (ROBAXIN) 750 mg, Oral, Nightly    norethindrone (AYGESTIN) 5 mg, Oral, Daily          Review of Systems:  Constitutional: no fever, reports some fatigue, weakness  Eye: no vision loss, eye redness, drainage, or pain  ENMT: no sore throat, ear pain, sinus pain/congestion, nasal congestion/drainage  Respiratory: no cough, no wheezing, reports dyspnea on exertion, no shortness a breath at baseline  Cardiovascular: no chest pain, no palpitations, reports bilateral lower extremity edema  Gastrointestinal: no nausea, vomiting, or diarrhea. No abdominal pain  Genitourinary: no dysuria, no urinary frequency or urgency, no hematuria  Hema/Lymph: no abnormal bruising or bleeding  Endocrine: no heat or cold intolerance, no excessive thirst or excessive urination  Musculoskeletal: no muscle or joint pain, no joint swelling  Integumentary: no skin rash or abnormal lesion  Neurologic: no headache, no dizziness, no weakness or numbness       Objective:     Vital Signs (Most Recent):  Temp: 99.5 °F (37.5 °C) (07/23/23 1426)  Pulse: 97 (07/23/23 1531)  Resp: (!) 23 (07/23/23 1531)  BP: 134/71 (07/23/23 1531)  SpO2: 100 % (07/23/23 1531) Vital Signs (24h Range):  Temp:  [99.5 °F (37.5 °C)] 99.5 °F (37.5 °C)  Pulse:  [] 97  Resp:  [21-36] 23  SpO2:  [99 %-100 %] 100 %  BP: (131-139)/(71-76) 134/71       Physical Examination:  General:  Obese female, resting comfortably in bed, in no acute distress   Eye: EOMI, clear conjunctiva, anicteric  HENT: Head-normocephalic and atraumatic  Neck:  No gross thyromegaly, trachea midline,  supple  Respiratory: clear to auscultation bilaterally without wheezes, rales, rhonchi  Cardiovascular: regular rate and rhythm without murmurs.  No gallops or rubs no JVD.  Gastrointestinal:  Obese abdomen, soft, nontender, no organomegaly, bowel sounds in all 4 quadrants  Genitourinary: no CVA tenderness to palpation  Musculoskeletal:  No gross deformities, edema noted 1+ nonpitting to bilateral ankles  Integumentary: no rashes or skin lesions on exposed present  Neurologic: no signs of peripheral neurological deficit, motor/sensory function intact  Psychiatric:  alert and oriented, cognitive function intact, cooperative with exam, good eye contact, judgement and insight intact, mood and affect full range.    Laboratory:  Most Recent Data:  CBC:  Recent Labs   Lab 07/23/23  1457   WBC 14.86*   HGB 5.3*   HCT 19.8*      MCV 66.7*   RDW 19.0*     CMP:  Recent Labs   Lab 07/23/23  1457   K 3.0*   CO2 20*   BUN 9.2*   CREATININE 0.93   ALBUMIN 3.2*   BILITOT 0.7   AST 38*   ALKPHOS 52   ALT 28     Coags:   Recent Labs   Lab 07/23/23  1457   LABPROT 7.4     DM:   Recent Labs   Lab 07/23/23  1457   CREATININE 0.93     Cardiac:   Recent Labs   Lab 07/23/23  1457   TROPONINI 0.012   BNP 57.3     Urinalysis:   Lab Results   Component Value Date    LABURIN (A) 10/19/2022     >/= 100,000 colonies/ml Streptococcus agalactiae (Group B)    APPEARANCEUA Clear 05/30/2023    PROTEINUA Negative 05/30/2023    UROBILINOGEN Normal 05/30/2023    BILIRUBINUA Negative 05/30/2023    RBCUA 0-5 05/30/2023    WBCUA 0-5 05/30/2023       Trended Cardiac Data:  Recent Labs   Lab 07/23/23  1457   TROPONINI 0.012   BNP 57.3       Other Results:  EKG (my interpretation):  Sinus tachycardia  Radiology:  Imaging Results              X-Ray Chest AP Portable (Final result)  Result time 07/23/23 15:30:31      Final result by Rodrigue Obrien MD (07/23/23 15:30:31)                   Impression:      No acute cardiopulmonary  process.      Electronically signed by: Rodrigue Obrien  Date:    07/23/2023  Time:    15:30               Narrative:    EXAMINATION:  XR CHEST AP PORTABLE    CLINICAL HISTORY:  SOB;    TECHNIQUE:  Single view of the chest    COMPARISON:  03/03/2015    FINDINGS:  No focal opacification, pleural effusion, or pneumothorax.    The cardiac silhouette remains prominent.    No acute osseous abnormality.                                         Lines/Drains/Airways       Peripheral Intravenous Line  Duration                  Peripheral IV - Single Lumen 07/23/23 1437 18 G Right Antecubital <1 day                     Assessment & Plan:     Symptomatic microcytic anemia  Abnormal uterine bleeding  - gyn consulted, appreciate their assistance   -will defer hormone therapy to gyn  - Hgb 5.3 on admit, baseline around 8  - currently hemodynamically stable  - Type and screen, blood consent obtained  - Transfuse for Hemoglobin <7  - hx of MISTI, will continue supplementation     SIRS 4 or 4  - after initial admission, patient resulted as SIRS 3 or 4 with T-max of 101.3°, pulse 101, RR greater than 20, leukocytosis at 15  -will pan culture, CT thorax pending for below, UA pending  -begin broad-spectrum coverage vanco/Zosyn (day 1)    BALLARD  - likely 2/2 #1  - will get TTE in AM to r/o CHF given BLE edema and BALLARD  - D-dimer elevation in ED, will get CT PE to rule out    Hypokalemia  - K of 3.0 on admit   -will replete electrolytes as needed  -monitor with a.m. BMP    Hyperchloremic non-anion gap metabolic acidosis  - will monitor after pRBC transfusion       CODE STATUS: Full Code   Access: Peripheral  Analgesia:  Acetaminophen p.o.  Diet: Diet Adult Regular  DVT Prophylaxis: Teds/SCDs  GI Prophylaxis: none needed  O2:  Room air  Fluids:  PRBC x3 units  Consults:  OBGYN    Disposition: day 0 of admission for  symptomatic anemia secondary to abnormal uterine bleeding, pending PRBC x3, monitoring H&H, TTE in a.m., OBGYN following    Ali  Dean DO  [facility], [facility]  07/23/2023

## 2023-07-24 PROBLEM — N93.8 DUB (DYSFUNCTIONAL UTERINE BLEEDING): Status: ACTIVE | Noted: 2023-07-24

## 2023-07-24 PROBLEM — R06.09 DYSPNEA ON EXERTION: Status: ACTIVE | Noted: 2023-07-24

## 2023-07-24 PROBLEM — I26.99 PULMONARY EMBOLISM: Status: ACTIVE | Noted: 2023-07-24

## 2023-07-24 PROBLEM — D64.9 SYMPTOMATIC ANEMIA: Status: ACTIVE | Noted: 2023-07-24

## 2023-07-24 PROBLEM — R50.9 FEVER, UNSPECIFIED: Status: ACTIVE | Noted: 2023-07-24

## 2023-07-24 PROBLEM — R06.02 SHORTNESS OF BREATH: Status: ACTIVE | Noted: 2023-07-24

## 2023-07-24 LAB
A-ADO2 BLOOD GAS (OHS): 139 MMHG
ALBUMIN SERPL-MCNC: 3.3 G/DL (ref 3.5–5)
ALBUMIN/GLOB SERPL: 0.8 RATIO (ref 1.1–2)
ALLENS TEST BLOOD GAS (OHS): YES
ALP SERPL-CCNC: 47 UNIT/L (ref 40–150)
ALT SERPL-CCNC: 37 UNIT/L (ref 0–55)
ANISOCYTOSIS BLD QL SMEAR: ABNORMAL
APTT PPP: 20.5 SECONDS
AST SERPL-CCNC: 44 UNIT/L (ref 5–34)
AV INDEX (PROSTH): 0.53
AV MEAN GRADIENT: 7 MMHG
AV PEAK GRADIENT: 12 MMHG
AV VALVE AREA: 1.64 CM2
AV VELOCITY RATIO: 0.52
BASE EXCESS BLD CALC-SCNC: -1 MMOL/L (ref -2–2)
BASOPHILS # BLD AUTO: 0.03 X10(3)/MCL
BASOPHILS # BLD AUTO: 0.05 X10(3)/MCL
BASOPHILS NFR BLD AUTO: 0.2 %
BASOPHILS NFR BLD AUTO: 0.4 %
BILIRUBIN DIRECT+TOT PNL SERPL-MCNC: 1.4 MG/DL
BLOOD GAS SAMPLE TYPE (OHS): ABNORMAL
BSA FOR ECHO PROCEDURE: 2.2 M2
BUN SERPL-MCNC: 6.4 MG/DL (ref 9.8–20.1)
BURR CELLS (OLG): ABNORMAL
CALCIUM SERPL-MCNC: 8.9 MG/DL (ref 8.4–10.2)
CHLORIDE SERPL-SCNC: 108 MMOL/L (ref 98–107)
CO2 BLDA-SCNC: 24.6 MMOL/L (ref 22–26)
CO2 SERPL-SCNC: 17 MMOL/L (ref 22–29)
COHGB MFR BLDA: 2.7 % (ref 0.5–1.5)
CREAT SERPL-MCNC: 0.88 MG/DL (ref 0.55–1.02)
CV ECHO LV RWT: 0.45 CM
DOP CALC AO PEAK VEL: 1.73 M/S
DOP CALC AO VTI: 37.6 CM
DOP CALC LVOT AREA: 3.1 CM2
DOP CALC LVOT DIAMETER: 1.98 CM
DOP CALC LVOT PEAK VEL: 0.9 M/S
DOP CALC LVOT STROKE VOLUME: 61.55 CM3
DOP CALC MV VTI: 30.3 CM
DOP CALCLVOT PEAK VEL VTI: 20 CM
DRAWN BY BLOOD GAS (OHS): ABNORMAL
E WAVE DECELERATION TIME: 199.36 MSEC
E/A RATIO: 1.41
ECHO LV POSTERIOR WALL: 1.12 CM (ref 0.6–1.1)
EJECTION FRACTION: 49 %
EOSINOPHIL # BLD AUTO: 0.06 X10(3)/MCL (ref 0–0.9)
EOSINOPHIL # BLD AUTO: 0.14 X10(3)/MCL (ref 0–0.9)
EOSINOPHIL NFR BLD AUTO: 0.4 %
EOSINOPHIL NFR BLD AUTO: 1 %
ERYTHROCYTE [DISTWIDTH] IN BLOOD BY AUTOMATED COUNT: 21.5 % (ref 11.5–17)
ERYTHROCYTE [DISTWIDTH] IN BLOOD BY AUTOMATED COUNT: 21.7 % (ref 11.5–17)
FIO2 BLOOD GAS (OHS): 36 %
FRACTIONAL SHORTENING: 30 % (ref 28–44)
GFR SERPLBLD CREATININE-BSD FMLA CKD-EPI: >60 MLS/MIN/1.73/M2
GLOBULIN SER-MCNC: 4.4 GM/DL (ref 2.4–3.5)
GLUCOSE SERPL-MCNC: 105 MG/DL (ref 74–100)
HCO3 BLDA-SCNC: 23.5 MMOL/L (ref 22–26)
HCT VFR BLD AUTO: 24.3 % (ref 37–47)
HCT VFR BLD AUTO: 25.1 % (ref 37–47)
HCT VFR BLD AUTO: 29.1 % (ref 37–47)
HGB BLD-MCNC: 7.1 G/DL (ref 12–16)
HGB BLD-MCNC: 7.3 G/DL (ref 12–16)
HGB BLD-MCNC: 8.3 G/DL (ref 12–16)
HR MV ECHO: 79 BPM
HYPOCHROMIA BLD QL SMEAR: ABNORMAL
IMM GRANULOCYTES # BLD AUTO: 0.06 X10(3)/MCL (ref 0–0.04)
IMM GRANULOCYTES # BLD AUTO: 0.12 X10(3)/MCL (ref 0–0.04)
IMM GRANULOCYTES NFR BLD AUTO: 0.4 %
IMM GRANULOCYTES NFR BLD AUTO: 0.8 %
INR BLD: 1.25 (ref 0–1.3)
INTERVENTRICULAR SEPTUM: 1.02 CM (ref 0.6–1.1)
LEFT ATRIUM SIZE: 4.42 CM
LEFT INTERNAL DIMENSION IN SYSTOLE: 3.46 CM (ref 2.1–4)
LEFT VENTRICLE DIASTOLIC VOLUME INDEX: 37.51 ML/M2
LEFT VENTRICLE DIASTOLIC VOLUME: 77.27 ML
LEFT VENTRICLE MASS INDEX: 95 G/M2
LEFT VENTRICLE SYSTOLIC VOLUME INDEX: 19.7 ML/M2
LEFT VENTRICLE SYSTOLIC VOLUME: 40.52 ML
LEFT VENTRICULAR INTERNAL DIMENSION IN DIASTOLE: 4.94 CM (ref 3.5–6)
LEFT VENTRICULAR MASS: 195.57 G
LPM (OHS): 4
LV LATERAL E/E' RATIO: 7.55 M/S
LVOT MG: 1.87 MMHG
LVOT MV: 0.63 CM/S
LYMPHOCYTES # BLD AUTO: 0.85 X10(3)/MCL (ref 0.6–4.6)
LYMPHOCYTES # BLD AUTO: 1.7 X10(3)/MCL (ref 0.6–4.6)
LYMPHOCYTES NFR BLD AUTO: 12.5 %
LYMPHOCYTES NFR BLD AUTO: 5.5 %
MAGNESIUM SERPL-MCNC: 1.9 MG/DL (ref 1.6–2.6)
MCH RBC QN AUTO: 20.4 PG (ref 27–31)
MCH RBC QN AUTO: 20.4 PG (ref 27–31)
MCHC RBC AUTO-ENTMCNC: 29.1 G/DL (ref 33–36)
MCHC RBC AUTO-ENTMCNC: 29.2 G/DL (ref 33–36)
MCV RBC AUTO: 69.8 FL (ref 80–94)
MCV RBC AUTO: 70.3 FL (ref 80–94)
METHGB MFR BLDA: 1 % (ref 0–1.5)
MICROCYTES BLD QL SMEAR: ABNORMAL
MONOCYTES # BLD AUTO: 1.37 X10(3)/MCL (ref 0.1–1.3)
MONOCYTES # BLD AUTO: 1.67 X10(3)/MCL (ref 0.1–1.3)
MONOCYTES NFR BLD AUTO: 12.3 %
MONOCYTES NFR BLD AUTO: 8.9 %
MV MEAN GRADIENT: 2 MMHG
MV PEAK A VEL: 0.59 M/S
MV PEAK E VEL: 0.83 M/S
MV PEAK GRADIENT: 4 MMHG
MV VALVE AREA BY CONTINUITY EQUATION: 2.03 CM2
NEUTROPHILS # BLD AUTO: 13.01 X10(3)/MCL (ref 2.1–9.2)
NEUTROPHILS # BLD AUTO: 9.94 X10(3)/MCL (ref 2.1–9.2)
NEUTROPHILS NFR BLD AUTO: 73.4 %
NEUTROPHILS NFR BLD AUTO: 84.2 %
NRBC BLD AUTO-RTO: 0.4 %
NRBC BLD AUTO-RTO: 0.5 %
O2 HB BLOOD GAS (OHS): 93.2 % (ref 94–100)
OHS LV EJECTION FRACTION SIMPSONS BIPLANE MOD: 5 %
OXYGEN DEVICE BLOOD GAS (OHS): ABNORMAL
OXYHGB MFR BLDA: 9 G/DL (ref 12–18)
PAO2 BLOOD GAS (OHS): 210 MMHG
PCO2 BLDA: 37 MMHG (ref 35–45)
PH BLDA: 7.41 [PH] (ref 7.35–7.45)
PHOSPHATE SERPL-MCNC: 2.7 MG/DL (ref 2.3–4.7)
PISA MRMAX VEL: 5.25 M/S
PISA TR MAX VEL: 2.83 M/S
PLATELET # BLD AUTO: 125 X10(3)/MCL (ref 130–400)
PLATELET # BLD AUTO: 214 X10(3)/MCL (ref 130–400)
PLATELET # BLD EST: ADEQUATE 10*3/UL
PLATELETS.RETICULATED NFR BLD AUTO: 10.5 % (ref 0.9–11.2)
PLATELETS.RETICULATED NFR BLD AUTO: 3.9 % (ref 0.9–11.2)
PMV BLD AUTO: 10.4 FL (ref 7.4–10.4)
PMV BLD AUTO: ABNORMAL FL
PO2 BLDA: 71 MMHG (ref 75–100)
POIKILOCYTOSIS BLD QL SMEAR: ABNORMAL
POLYCHROMASIA BLD QL SMEAR: ABNORMAL
POTASSIUM SERPL-SCNC: 4.1 MMOL/L (ref 3.5–5.1)
PROT SERPL-MCNC: 7.7 GM/DL (ref 6.4–8.3)
PROTHROMBIN TIME: 15.4 SECONDS (ref 11.4–14)
RA PRESSURE: 8 MMHG
RA WIDTH: 5.3 CM
RBC # BLD AUTO: 3.48 X10(6)/MCL (ref 4.2–5.4)
RBC # BLD AUTO: 3.57 X10(6)/MCL (ref 4.2–5.4)
RBC MORPH BLD: ABNORMAL
SAMPLE SITE BLOOD GAS (OHS): ABNORMAL
SAO2 % BLDA: 96.7 %
SODIUM SERPL-SCNC: 138 MMOL/L (ref 136–145)
TDI LATERAL: 0.11 M/S
TR MAX PG: 32 MMHG
TRICUSPID ANNULAR PLANE SYSTOLIC EXCURSION: 2.76 CM
TV REST PULMONARY ARTERY PRESSURE: 40 MMHG
WBC # SPEC AUTO: 13.56 X10(3)/MCL (ref 4.5–11.5)
WBC # SPEC AUTO: 15.44 X10(3)/MCL (ref 4.5–11.5)

## 2023-07-24 PROCEDURE — 36410 VNPNXR 3YR/> PHY/QHP DX/THER: CPT

## 2023-07-24 PROCEDURE — 94761 N-INVAS EAR/PLS OXIMETRY MLT: CPT

## 2023-07-24 PROCEDURE — 63600175 PHARM REV CODE 636 W HCPCS: Performed by: STUDENT IN AN ORGANIZED HEALTH CARE EDUCATION/TRAINING PROGRAM

## 2023-07-24 PROCEDURE — 82803 BLOOD GASES ANY COMBINATION: CPT

## 2023-07-24 PROCEDURE — 11000001 HC ACUTE MED/SURG PRIVATE ROOM

## 2023-07-24 PROCEDURE — 83735 ASSAY OF MAGNESIUM: CPT | Performed by: STUDENT IN AN ORGANIZED HEALTH CARE EDUCATION/TRAINING PROGRAM

## 2023-07-24 PROCEDURE — 25000003 PHARM REV CODE 250: Performed by: STUDENT IN AN ORGANIZED HEALTH CARE EDUCATION/TRAINING PROGRAM

## 2023-07-24 PROCEDURE — 85730 THROMBOPLASTIN TIME PARTIAL: CPT | Performed by: STUDENT IN AN ORGANIZED HEALTH CARE EDUCATION/TRAINING PROGRAM

## 2023-07-24 PROCEDURE — 93005 ELECTROCARDIOGRAM TRACING: CPT

## 2023-07-24 PROCEDURE — C1751 CATH, INF, PER/CENT/MIDLINE: HCPCS

## 2023-07-24 PROCEDURE — 85014 HEMATOCRIT: CPT | Performed by: STUDENT IN AN ORGANIZED HEALTH CARE EDUCATION/TRAINING PROGRAM

## 2023-07-24 PROCEDURE — 25000003 PHARM REV CODE 250: Performed by: INTERNAL MEDICINE

## 2023-07-24 PROCEDURE — 99900035 HC TECH TIME PER 15 MIN (STAT)

## 2023-07-24 PROCEDURE — 85025 COMPLETE CBC W/AUTO DIFF WBC: CPT | Performed by: STUDENT IN AN ORGANIZED HEALTH CARE EDUCATION/TRAINING PROGRAM

## 2023-07-24 PROCEDURE — 25000003 PHARM REV CODE 250

## 2023-07-24 PROCEDURE — 80053 COMPREHEN METABOLIC PANEL: CPT | Performed by: STUDENT IN AN ORGANIZED HEALTH CARE EDUCATION/TRAINING PROGRAM

## 2023-07-24 PROCEDURE — 21400001 HC TELEMETRY ROOM

## 2023-07-24 PROCEDURE — 36569 INSJ PICC 5 YR+ W/O IMAGING: CPT

## 2023-07-24 PROCEDURE — 85610 PROTHROMBIN TIME: CPT | Performed by: STUDENT IN AN ORGANIZED HEALTH CARE EDUCATION/TRAINING PROGRAM

## 2023-07-24 PROCEDURE — 94660 CPAP INITIATION&MGMT: CPT

## 2023-07-24 PROCEDURE — 84100 ASSAY OF PHOSPHORUS: CPT | Performed by: STUDENT IN AN ORGANIZED HEALTH CARE EDUCATION/TRAINING PROGRAM

## 2023-07-24 PROCEDURE — 76937 US GUIDE VASCULAR ACCESS: CPT

## 2023-07-24 PROCEDURE — 25500020 PHARM REV CODE 255: Performed by: INTERNAL MEDICINE

## 2023-07-24 PROCEDURE — 27000190 HC CPAP FULL FACE MASK W/VALVE

## 2023-07-24 PROCEDURE — A4216 STERILE WATER/SALINE, 10 ML: HCPCS | Performed by: INTERNAL MEDICINE

## 2023-07-24 PROCEDURE — 36600 WITHDRAWAL OF ARTERIAL BLOOD: CPT

## 2023-07-24 PROCEDURE — 27000221 HC OXYGEN, UP TO 24 HOURS

## 2023-07-24 RX ORDER — HEPARIN SODIUM,PORCINE/D5W 25000/250
0-40 INTRAVENOUS SOLUTION INTRAVENOUS CONTINUOUS
Status: DISCONTINUED | OUTPATIENT
Start: 2023-07-24 | End: 2023-07-25

## 2023-07-24 RX ORDER — LANOLIN ALCOHOL/MO/W.PET/CERES
1 CREAM (GRAM) TOPICAL EVERY OTHER DAY
Status: DISCONTINUED | OUTPATIENT
Start: 2023-07-26 | End: 2023-07-26

## 2023-07-24 RX ORDER — PANTOPRAZOLE SODIUM 40 MG/1
40 TABLET, DELAYED RELEASE ORAL DAILY
Status: DISCONTINUED | OUTPATIENT
Start: 2023-07-25 | End: 2023-07-31 | Stop reason: HOSPADM

## 2023-07-24 RX ORDER — SODIUM CHLORIDE 0.9 % (FLUSH) 0.9 %
10 SYRINGE (ML) INJECTION
Status: DISCONTINUED | OUTPATIENT
Start: 2023-07-24 | End: 2023-07-31 | Stop reason: HOSPADM

## 2023-07-24 RX ORDER — SODIUM CHLORIDE 9 MG/ML
INJECTION, SOLUTION INTRAVENOUS
Status: DISCONTINUED | OUTPATIENT
Start: 2023-07-24 | End: 2023-07-31 | Stop reason: HOSPADM

## 2023-07-24 RX ORDER — MUPIROCIN 20 MG/G
OINTMENT TOPICAL 2 TIMES DAILY
Status: COMPLETED | OUTPATIENT
Start: 2023-07-24 | End: 2023-07-29

## 2023-07-24 RX ORDER — HEPARIN SODIUM,PORCINE/D5W 25000/250
0-40 INTRAVENOUS SOLUTION INTRAVENOUS CONTINUOUS
Status: DISCONTINUED | OUTPATIENT
Start: 2023-07-24 | End: 2023-07-24

## 2023-07-24 RX ORDER — LORAZEPAM 0.5 MG/1
0.5 TABLET ORAL ONCE
Status: COMPLETED | OUTPATIENT
Start: 2023-07-24 | End: 2023-07-24

## 2023-07-24 RX ORDER — SODIUM CHLORIDE 0.9 % (FLUSH) 0.9 %
10 SYRINGE (ML) INJECTION EVERY 6 HOURS
Status: DISCONTINUED | OUTPATIENT
Start: 2023-07-24 | End: 2023-07-31 | Stop reason: HOSPADM

## 2023-07-24 RX ADMIN — ACETAMINOPHEN 650 MG: 325 TABLET, FILM COATED ORAL at 02:07

## 2023-07-24 RX ADMIN — LORAZEPAM 0.5 MG: 0.5 TABLET ORAL at 01:07

## 2023-07-24 RX ADMIN — POTASSIUM CHLORIDE 10 MEQ: 7.46 INJECTION, SOLUTION INTRAVENOUS at 12:07

## 2023-07-24 RX ADMIN — ACETAMINOPHEN 650 MG: 325 TABLET, FILM COATED ORAL at 10:07

## 2023-07-24 RX ADMIN — HEPARIN SODIUM AND DEXTROSE 12 UNITS/KG/HR: 10000; 5 INJECTION INTRAVENOUS at 08:07

## 2023-07-24 RX ADMIN — PIPERACILLIN AND TAZOBACTAM 4.5 G: 4; .5 INJECTION, POWDER, LYOPHILIZED, FOR SOLUTION INTRAVENOUS; PARENTERAL at 01:07

## 2023-07-24 RX ADMIN — IOHEXOL 100 ML: 350 INJECTION, SOLUTION INTRAVENOUS at 12:07

## 2023-07-24 RX ADMIN — VANCOMYCIN HYDROCHLORIDE 750 MG: 750 INJECTION, POWDER, LYOPHILIZED, FOR SOLUTION INTRAVENOUS at 08:07

## 2023-07-24 RX ADMIN — SODIUM CHLORIDE: 9 INJECTION, SOLUTION INTRAVENOUS at 10:07

## 2023-07-24 RX ADMIN — PIPERACILLIN AND TAZOBACTAM 4.5 G: 4; .5 INJECTION, POWDER, LYOPHILIZED, FOR SOLUTION INTRAVENOUS; PARENTERAL at 02:07

## 2023-07-24 RX ADMIN — Medication 5 MG: at 09:07

## 2023-07-24 RX ADMIN — DIPHENHYDRAMINE HYDROCHLORIDE 25 MG: 50 INJECTION INTRAMUSCULAR; INTRAVENOUS at 11:07

## 2023-07-24 RX ADMIN — FERROUS SULFATE TAB 325 MG (65 MG ELEMENTAL FE) 1 EACH: 325 (65 FE) TAB at 08:07

## 2023-07-24 RX ADMIN — SODIUM CHLORIDE, PRESERVATIVE FREE 10 ML: 5 INJECTION INTRAVENOUS at 12:07

## 2023-07-24 RX ADMIN — VANCOMYCIN HYDROCHLORIDE 1250 MG: 1 INJECTION, POWDER, LYOPHILIZED, FOR SOLUTION INTRAVENOUS at 01:07

## 2023-07-24 RX ADMIN — SODIUM CHLORIDE, PRESERVATIVE FREE 10 ML: 5 INJECTION INTRAVENOUS at 05:07

## 2023-07-24 RX ADMIN — ACETAMINOPHEN 650 MG: 325 TABLET, FILM COATED ORAL at 12:07

## 2023-07-24 RX ADMIN — PIPERACILLIN AND TAZOBACTAM 4.5 G: 4; .5 INJECTION, POWDER, LYOPHILIZED, FOR SOLUTION INTRAVENOUS; PARENTERAL at 08:07

## 2023-07-24 RX ADMIN — MUPIROCIN: 20 OINTMENT TOPICAL at 09:07

## 2023-07-24 NOTE — PROCEDURES
Kely Sparks is a 53 y.o. female patient.    Temp: (!) 100.4 °F (38 °C) (07/24/23 1456)  Pulse: 86 (07/24/23 1201)  Resp: 16 (07/24/23 1201)  BP: 112/82 (07/24/23 1201)  SpO2: 100 % (07/24/23 1201)  Weight: 113.9 kg (251 lb) (07/24/23 0809)  Height: 5' (152.4 cm) (07/24/23 0809)    PICC  Time out: Immediately prior to procedure a time out was called to verify the correct patient, procedure, equipment, support staff and site/side marked as required  Indications: med administration  Preparation: skin prepped with ChloraPrep  Skin prep agent dried: skin prep agent completely dried prior to procedure  Sterile barriers: all five maximum sterile barriers used - cap, mask, sterile gown, sterile gloves, and large sterile sheet  Hand hygiene: hand hygiene performed prior to central venous catheter insertion  Location details: left brachial  Catheter type: double lumen  Catheter size: 5 Fr  Catheter Length: 46cm    Ultrasound guidance: yes  Needle advanced into vessel with real time Ultrasound guidance.  Guidewire confirmed in vessel.  Sterile sheath used.          Name lucio  7/24/2023

## 2023-07-24 NOTE — PROGRESS NOTES
[facility] Medicine Wards   Progress Note     Resident Team: [facility] Medicine List 3  Attending Physician: Destiny Alba MD  Resident: Lashanda Moran DO  Hospital Length of Stay: 1 days    Subjective:      Brief HPI:  Kely Sparks is a 52 y.o.  female with a history of abnormal uterine bleeding who presented to [facility] ED on 2023  with complaint of feeling short of breath.  Patient reports that she began feeling short of breath on 2023.  She reports that she is a history of symptomatic anemia secondary to abnormal uterine bleeding.  She reports her last menstrual cycle was on  with heavy flow and multiple clots.  She reports that her cycle completed at the beginning of July however she is been spotting since then.  She reports no abnormal uterine discharge, foul odors, rashes, hematuria, hematemesis, hematochezia, melena, bright red blood per rectum, bruises, hemoptysis.  She does report ongoing dyspnea on exertion since onset of symptoms, and reports bilateral lower extremity swelling.  Reports no chest pains, no palpitations, no fevers, no chills, no sick contacts, no trauma, no abdominal pain, no nausea, no vomiting, no diarrhea.  Has no other complaints at this time.  In the ED, found to have H/H of 5.3/19.8.  [facility] was consulted for admission.       Significant Events/Hospital Course:   : CT-PE with  pulmonary emboli within right lower lobe segmental and subsegmental branches as well as suspected within left lower lobe subsegmental branches, started on heparin gtt    24 hour Interval History:   Patient was eventful 24 hours.  Had episode of anxiety with hypoxia and tachypnea.  Required initiation of BiPAP overnight for persistent hypoxia however was able to be successfully weaned earlier this a.m..  Eventually able to get CT PE which depicted multiple pulmonary clots.  Discussed case with OBGYN and ultimately ruled therapy for PEs benefit out with risk of spotting from  uterus and patient was initiated on heparin drip.  Patient currently reporting some shortness of breath and excessive dyspnea on exertion.  Having some anxiety symptoms and palpitations.  Reporting off and on fevers.  No nausea no vomiting.  No headaches.  No syncope.  Has no other complaints at this time.      Review of Systems:  Pertinent items are noted in HPI.     Objective:     Vital Signs (Most Recent):  Temp: 98.7 °F (37.1 °C) (07/24/23 1201)  Pulse: 86 (07/24/23 1201)  Resp: 16 (07/24/23 1201)  BP: 112/82 (07/24/23 1201)  SpO2: 100 % (07/24/23 1201) Vital Signs (24h Range):  Temp:  [98.3 °F (36.8 °C)-102.2 °F (39 °C)] 98.7 °F (37.1 °C)  Pulse:  [] 86  Resp:  [16-40] 16  SpO2:  [96 %-100 %] 100 %  BP: (109-185)/(70-89) 112/82       Physical Examination:  General:  Obese female, resting comfortably in bed, in no acute distress   Eye: EOMI, clear conjunctiva, anicteric  HENT: Head-normocephalic and atraumatic, nasal canula in place  Neck:  No gross thyromegaly, trachea midline, supple  Respiratory: clear to auscultation bilaterally without wheezes, rales, rhonchi  Cardiovascular: regular rate and rhythm without murmurs.  No gallops or rubs no JVD.  Gastrointestinal:  Obese abdomen, soft, nontender, no organomegaly, bowel sounds in all 4 quadrants  Musculoskeletal:  No gross deformities, edema noted 1+ nonpitting to bilateral ankles  Integumentary: no rashes or skin lesions on exposed present  Neurologic: no signs of peripheral neurological deficit, motor/sensory function intact  Psychiatric:  alert and oriented, cognitive function intact, cooperative with exam, good eye contact, judgement and insight intact, mood and affect full range.    Laboratory:  Most Recent Data:  CBC:  Recent Labs   Lab 07/23/23  1457 07/23/23  1946 07/24/23  0222 07/24/23  0822   WBC 14.86*  --  15.44*  --    HGB 5.3* 6.6* 7.3* 8.3*   HCT 19.8* 24.5* 25.1* 29.1*     --  214  --    MCV 66.7*  --  70.3*  --    RDW 19.0*  --   21.5*  --      CMP:  Recent Labs   Lab 07/23/23  1457 07/24/23  0124   K 3.0* 4.1   CO2 20* 17*   BUN 9.2* 6.4*   CREATININE 0.93 0.88   ALBUMIN 3.2* 3.3*   BILITOT 0.7 1.4   AST 38* 44*   ALKPHOS 52 47   ALT 28 37     Coags:   Recent Labs   Lab 07/23/23  1457 07/24/23  0124   LABPROT 7.4 7.7     DM:   Recent Labs   Lab 07/23/23  1457 07/24/23  0124   CREATININE 0.93 0.88     Cardiac:   Recent Labs   Lab 07/23/23  1457   TROPONINI 0.012   BNP 57.3     Pulm:     Recent Labs   Lab 07/24/23  0050   PO2 71.0*      Urinalysis:   Lab Results   Component Value Date    LABURIN (A) 10/19/2022     >/= 100,000 colonies/ml Streptococcus agalactiae (Group B)    APPEARANCEUA Clear 05/30/2023    PROTEINUA Negative 05/30/2023    UROBILINOGEN Normal 05/30/2023    BILIRUBINUA Negative 05/30/2023    RBCUA 0-5 05/30/2023    WBCUA 0-5 05/30/2023       Trended Cardiac Data:  Recent Labs   Lab 07/23/23  1457   TROPONINI 0.012   BNP 57.3       Microbiology Data Reviewed: yes  Pertinent Findings:  NGTD    Other Results:  EKG (my interpretation): sinus tachycardia.    Radiology:  Imaging Results              CTA Chest Non-Coronary (PE Studies) (In process)                      X-Ray Chest AP Portable (Final result)  Result time 07/23/23 15:30:31      Final result by Rodrigue Obrien MD (07/23/23 15:30:31)                   Impression:      No acute cardiopulmonary process.      Electronically signed by: Rodrigue Obrien  Date:    07/23/2023  Time:    15:30               Narrative:    EXAMINATION:  XR CHEST AP PORTABLE    CLINICAL HISTORY:  SOB;    TECHNIQUE:  Single view of the chest    COMPARISON:  03/03/2015    FINDINGS:  No focal opacification, pleural effusion, or pneumothorax.    The cardiac silhouette remains prominent.    No acute osseous abnormality.                                      Current Medications:     Infusions:       Scheduled:   [START ON 7/26/2023] ferrous sulfate  1 tablet Oral Every other day    lactated ringers  1,000  mL Intravenous Once    norethindrone  5 mg Oral Daily    piperacillin-tazobactam (Zosyn) IV (PEDS and ADULTS) (extended infusion is not appropriate)  4.5 g Intravenous Q8H    sodium chloride 0.9%  10 mL Intravenous Q6H    vancomycin (VANCOCIN) IV (PEDS and ADULTS)  1,250 mg Intravenous Once    vancomycin (VANCOCIN) IV (PEDS and ADULTS)  1,500 mg Intravenous Q12H        PRN:  sodium chloride 0.9%, sodium chloride 0.9%, acetaminophen, labetalol, melatonin, ondansetron, sodium chloride 0.9%, Flushing PICC Protocol **AND** sodium chloride 0.9% **AND** sodium chloride 0.9%, Pharmacy to dose Vancomycin consult **AND** vancomycin - pharmacy to dose    Antibiotics and Day Number of Therapy:  Vanco/Zosyn (day 2)      Intake/Output Summary (Last 24 hours) at 7/24/2023 1218  Last data filed at 7/24/2023 0130  Gross per 24 hour   Intake 2392.5 ml   Output --   Net 2392.5 ml       Lines/Drains/Airways       Peripheral Intravenous Line  Duration                  Midline Catheter Insertion/Assessment  - Single Lumen 07/24/23 0919 Left brachial vein <1 day         Peripheral IV - Single Lumen 07/23/23 1437 18 G Right Antecubital <1 day         Peripheral IV - Single Lumen 07/23/23 1840 20 G Left Antecubital <1 day                      Assessment & Plan:     Bilateral Pulmonary Emboli  Acute Hypoxic Respiratory Failure  - CT-PE with pulmonary emboli within right lower lobe segmental and subsegmental branches as well as suspected within left lower lobe subsegmental branches.  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 BMP     Hyperchloremic non-anion gap metabolic acidosis  - will monitor after pRBC transfusion       CODE STATUS: Full Code   Access: PICC  Antibiotics: Vanco/Zosyn (day 2)   Analgesia: acetaminophen  Diet: Diet Adult Regular  DVT Prophylaxis: Heparin  GI Prophylaxis: proton pump inhibitor per orders  O2: Nasal Cannula 4 LPM  Fluids: none    Consults: OB/GYN    Disposition: day 1 of admission for  bilateral pulmonary emboli and symptomatic anemia, on heparin GTT, transfused 2 units PRBC, monitoring H&H, gyn following      Lashanda Moran DO  [facility], [facility]  07/24/2023

## 2023-07-24 NOTE — PROCEDURES
Kely Sparks is a 53 y.o. female patient.    Temp: 98.3 °F (36.8 °C) (07/24/23 0809)  Pulse: 78 (07/24/23 0809)  Resp: 20 (07/24/23 0501)  BP: 109/72 (07/24/23 0809)  SpO2: 99 % (07/24/23 0800)  Weight: 113.9 kg (251 lb) (07/24/23 0809)  Height: 5' (152.4 cm) (07/24/23 0809)    PICC  Time out: Immediately prior to procedure a time out was called to verify the correct patient, procedure, equipment, support staff and site/side marked as required  Indications: med administration  Preparation: skin prepped with ChloraPrep  Skin prep agent dried: skin prep agent completely dried prior to procedure  Sterile barriers: all five maximum sterile barriers used - cap, mask, sterile gown, sterile gloves, and large sterile sheet  Location details: left brachial  Catheter type: single lumen  Catheter size: 4 Fr  Catheter Length: 13cm    Ultrasound guidance: yes  Needle advanced into vessel with real time Ultrasound guidance.  Guidewire confirmed in vessel.  Sterile sheath used.          Name yonathan felicia   7/24/2023

## 2023-07-24 NOTE — PROGRESS NOTES
Pharmacokinetic Assessment Follow Up: IV Vancomycin    Vancomycin Regimen Plan:    Change vancomycin dosing to include a 2 gm load with 1500mg q12h to follow.  Level in AM to access future dosing.     Pharmacy will continue to follow and monitor vancomycin.    Please contact pharmacy at extension [phone] for questions regarding this assessment.    Thank you for the consult,   Ana Lauralayla Dorman       Patient brief summary:  Kely Sparks is a 53 y.o. female initiated on antimicrobial therapy with IV Vancomycin for treatment of bacteremia    The patient's current regimen is 1500mg q12h    Drug Allergies:   Review of patient's allergies indicates:   Allergen Reactions    Shellfish containing products Hives and Itching       Actual Body Weight:   113.9kg    Renal Function:   Estimated Creatinine Clearance: 86.1 mL/min (based on SCr of 0.88 mg/dL).,     CBC (last 72 hours):  Recent Labs   Lab Result Units 07/23/23  1457 07/23/23  1946 07/24/23  0222   WBC x10(3)/mcL 14.86*  --  15.44*   Hgb g/dL 5.3* 6.6* 7.3*   Hct % 19.8* 24.5* 25.1*   Platelet x10(3)/mcL 269  --  214   Mono % % 12.4  --  8.9   Eos % % 0.4  --  0.4   Basophil % % 0.2  --  0.2       Metabolic Panel (last 72 hours):  Recent Labs   Lab Result Units 07/23/23  1457 07/24/23  0124 07/24/23  0226   Sodium Level mmol/L 137 138  --    Potassium Level mmol/L 3.0* 4.1  --    Chloride mmol/L 105 108*  --    Carbon Dioxide mmol/L 20* 17*  --    Glucose Level mg/dL 101* 105*  --    Blood Urea Nitrogen mg/dL 9.2* 6.4*  --    Creatinine mg/dL 0.93 0.88  --    Albumin Level g/dL 3.2* 3.3*  --    Bilirubin Total mg/dL 0.7 1.4  --    Alkaline Phosphatase unit/L 52 47  --    Aspartate Aminotransferase unit/L 38* 44*  --    Alanine Aminotransferase unit/L 28 37  --    Magnesium Level mg/dL  --   --  1.90   Phosphorus Level mg/dL  --   --  2.7       Vancomycin Administrations:  vancomycin given in the last 96 hours                     vancomycin 750 mg in  dextrose 5 % (D5W) 250 mL IVPB (mg) 750 mg New Bag 07/24/23 0842     750 mg New Bag 07/23/23 2123                    Microbiologic Results:  Microbiology Results (last 7 days)       Procedure Component Value Units Date/Time    Blood Culture [[id number]] Collected: 07/23/23 1946    Order Status: Sent Specimen: Blood Updated: 07/23/23 1951    Blood Culture [[id number]] Collected: 07/23/23 1946    Order Status: Sent Specimen: Blood Updated: 07/23/23 1951

## 2023-07-24 NOTE — PLAN OF CARE
07/24/23 1339   Discharge Assessment   Assessment Type Discharge Planning Assessment   Confirmed/corrected address, phone number and insurance Yes   Confirmed Demographics Correct on Facesheet   Source of Information patient   When was your last doctors appointment?   (PCP: Sera Villarreal)   Does patient/caregiver understand observation status   (Inpatient)   Communicated YOKO with patient/caregiver Date not available/Unable to determine   Reason For Admission SOB, DUB, BALLARD, Symptomatic anemia   People in Home child(margaret), adult   Facility Arrived From: Home   Do you expect to return to your current living situation? Yes   Do you have help at home or someone to help you manage your care at home? Yes   Who are your caregiver(s) and their phone number(s)? Jamil Sparks, son, P: [phone]; Alexis Sparks, daughter, P: [phone]   Prior to hospitilization cognitive status: Alert/Oriented;No Deficits   Current cognitive status: Alert/Oriented;No Deficits   Home Accessibility not wheelchair accessible;stairs to enter home   Number of Stairs, Main Entrance four   Stair Railings, Main Entrance railings safe and in good condition;railing on left side (ascending)   Home Layout Able to live on 1st floor   Equipment Currently Used at Home none   Readmission within 30 days? No   Patient currently being followed by outpatient case management? No   Do you currently have service(s) that help you manage your care at home? No   Do you take prescription medications? Yes  ([facility] in [place])   Do you have prescription coverage? Yes   Coverage Generic Commercial   Do you have any problems affording any of your prescribed medications? No   Is the patient taking medications as prescribed? yes   Who is going to help you get home at discharge? Family   How do you get to doctors appointments? car, drives self   Are you on dialysis? No   Do you take coumadin? No   Discharge Plan A Home with family   DME Needed Upon Discharge   none   Discharge Plan discussed with: Patient;Adult children   Transition of Care Barriers None   Physical Activity   On average, how many days per week do you engage in moderate to strenuous exercise (like a brisk walk)? 0 days   On average, how many minutes do you engage in exercise at this level? 0 min   Financial Resource Strain   How hard is it for you to pay for the very basics like food, housing, medical care, and heating? Somewhat   Housing Stability   In the last 12 months, was there a time when you were not able to pay the mortgage or rent on time? N   In the last 12 months, how many places have you lived? 1   In the last 12 months, was there a time when you did not have a steady place to sleep or slept in a shelter (including now)? N   Transportation Needs   In the past 12 months, has lack of transportation kept you from medical appointments or from getting medications? no   In the past 12 months, has lack of transportation kept you from meetings, work, or from getting things needed for daily living? No   Food Insecurity   Within the past 12 months, you worried that your food would run out before you got the money to buy more. Never true   Within the past 12 months, the food you bought just didn't last and you didn't have money to get more. Never true   Stress   Do you feel stress - tense, restless, nervous, or anxious, or unable to sleep at night because your mind is troubled all the time - these days? To some exte   Social Connections   In a typical week, how many times do you talk on the phone with family, friends, or neighbors? More than 3   How often do you get together with friends or relatives? More than 3   How often do you attend Jehovah's witness or Faith services? More than 4   Do you belong to any clubs or organizations such as Jehovah's witness groups, unions, fraternal or athletic groups, or school groups? No   How often do you attend meetings of the clubs or organizations you belong to? Never   Are you  , , , , never , or living with a partner? Never marrie   Alcohol Use   Q1: How often do you have a drink containing alcohol? Never   Q2: How many drinks containing alcohol do you have on a typical day when you are drinking? None   Q3: How often do you have six or more drinks on one occasion? Never   OTHER   Name(s) of People in Home Jamil Sparks, son, P: [phone]; Alexis Jasonalber, daughter, P: [phone]

## 2023-07-24 NOTE — CARE UPDATE
Received call from nurse around 12:40am stating patient feeling SOB, satting 88% on 2L NC, and tachycardic to 130s. Dr. Calzada and I went to bedside to examine pt. Upon entering room, she was in obvious respiratory distress but denied chest pain. Rales noted on lung auscultation. Supplemental oxygen increased to 4L NC but saturations on monitor decreased to about 75%. ABG drawn without evidence of respiratory acidosis. BiPAP initiated with improvement in respiratory rate and oxygen saturations. EKG without ischemic changes and without obvious signs of right heart strain. Stat CXR ordered. Transfusion reaction considered as was receiving blood transfusion (third of three units) at time of exam; pt stated that she has received transfusions in past without reaction. Decided to pause transfusion at this time. Pending stat H/H.     At this time, CT PE study has not be done due to patient receiving blood transfusion.

## 2023-07-24 NOTE — PROGRESS NOTES
GYNECOLOGY INPATIENT PROGRESS NOTE    Kely Sparks is a 53 y.o. female   who presented to [facility] ED due to shortness of breath and symptomatic anemia. Gyn consulted for management of symptomatic anemia presumed to be 2/2 known AUB-L. Pt not currently bleeding .She was admitted to  HD2.       The patient feels well overnight. Denies pain. Pt started having vaginal bleeding last night. States that it isn't heavy and she only sees it on the purewick.. Is tolerating a regular PO diet.    Review of Systems  Denies fevers, chills, headaches nausea, vomiting, dizziness, chest pain, or shortness of breath.     Medications      diphenhydrAMINE  25 mg Intravenous Once    ferrous sulfate  1 tablet Oral Daily    iohexoL        lactated ringers  1,000 mL Intravenous Once    norethindrone  5 mg Oral Daily    piperacillin-tazobactam (Zosyn) IV (PEDS and ADULTS) (extended infusion is not appropriate)  4.5 g Intravenous Q8H    vancomycin (VANCOCIN) IV (PEDS and ADULTS)  750 mg Intravenous Q12H         sodium chloride, acetaminophen, labetalol, melatonin, ondansetron, sodium chloride 0.9%, Pharmacy to dose Vancomycin consult **AND** vancomycin - pharmacy to dose    Objective    Vitals:    23 2351 23 0115 23 0130 23 0501   BP: 125/71 (!) 185/84 (!) 166/89 112/73   Pulse: 61 (!) 130 (!) 117 90   Resp: 20 (!) 30 (!) 40 20   Temp: (!) 102 °F (38.9 °C)   98.7 °F (37.1 °C)   TempSrc: Oral   Axillary   SpO2: 97% 100% 100% 100%   Weight:       Height:           UOP:2.3 L over 12 hrs     Physical Exam    General: alert and oriented, in no acute distress, resting in bed    Lungs: clear to auscultation bilaterally, 4 L of NC   Heart:: Tachy rate and rhythm   Abdomen Soft,non-tender ,  bowel sounds   GYN: Bleeding noted on purewick. Approx 50 % saturated with dilute blood   DVT Evaluation: No cords or calf tenderness.  No significant calf/ankle edema.   Edema: None     Labs  Trended Lab Data:  Recent  Labs   Lab 07/23/23  1457 07/23/23  1946 07/24/23  0124 07/24/23  0222   WBC 14.86*  --   --  15.44*   HGB 5.3* 6.6*  --  7.3*   HCT 19.8* 24.5*  --  25.1*     --   --  214   MCV 66.7*  --   --  70.3*   RDW 19.0*  --   --  21.5*     --  138  --    K 3.0*  --  4.1  --    CO2 20*  --  17*  --    BUN 9.2*  --  6.4*  --    CALCIUM 8.7  --  8.9  --    ALBUMIN 3.2*  --  3.3*  --    BILITOT 0.7  --  1.4  --    AST 38*  --  44*  --    ALKPHOS 52  --  47  --    ALT 28  --  37  --      Imaging :   US Pelvis Comp with Transvag NON-OB (xpd     Result Date: 5/30/2023  EXAMINATION: US PELVIS COMP WITH TRANSVAG NON-OB (XPD) CLINICAL HISTORY: worsening vaginal bleeding, anemic, fibroids 6 months ago; TECHNIQUE: Transabdominal and endovaginal ultrasound of the pelvis. COMPARISON: Ultrasound 05/26/2022 FINDINGS: Poor overall uterine visualization due to decreased sonographic penetration.  Uterus is enlarged with at least 2 suspected fibroids on the transabdominal images.  These measure up to 4.2 and 6.4 cm.  Obscured endometrial stripe.  Ovaries not visualized.  No large volume pelvic ascites. Measurements: Uterus: 11.7 x 9.4 x 8.2 cm Right ovary: Obscured Left ovary: Obscured      1. Limited assessment.  Enlarged uterus with at least 2 suspected fibroids. 2. Ovaries not visualized      Assessment/Plan  53 y.o. HD#2 admitted to management for suspected PE. Pt currently stable on 4L NC. Scant bleeding on purwick    #AUB-L   Patient previously w/u for AUB-L and hysterectomy for definitive surgical management with our clinic  Pt currently spotting on purewick .   EMB 6/2022 demonstrating benign strips of endometrial and endocervical tissue  Pap 6/2022 NILM/HPV-  She has been on aygestin for the past 6 weeks and continues to need blood transfusion from heavy menses (dropped from H/H 7.3/25.5 --> 5.3/19.8 with one  period)  recommend continuing Aygestin 5 mg  at this time   Will work up I the outpt.     #Anemia   -  H/H on  admission 5.3/19.8--> 2.5 pRBC--> 7.3/25.1  - Continue to monitor and would recommend additional pRBCs if H/H drops       Discussed with staff, Dr. Provost Urmila Lentz MD   [facility], PGY2

## 2023-07-25 LAB
ABO + RH BLD: NORMAL
ANISOCYTOSIS BLD QL SMEAR: ABNORMAL
ANISOCYTOSIS BLD QL SMEAR: ABNORMAL
APPEARANCE UR: ABNORMAL
APTT PPP: 34.4 SECONDS
APTT PPP: 42.7 SECONDS
APTT PPP: 47.5 SECONDS
BACTERIA #/AREA URNS AUTO: ABNORMAL /HPF
BASOPHILS # BLD AUTO: 0.05 X10(3)/MCL
BASOPHILS # BLD AUTO: 0.05 X10(3)/MCL
BASOPHILS NFR BLD AUTO: 0.4 %
BASOPHILS NFR BLD AUTO: 0.4 %
BILIRUB UR QL STRIP.AUTO: NEGATIVE
BLD PROD TYP BPU: NORMAL
BLOOD UNIT EXPIRATION DATE: NORMAL
BLOOD UNIT TYPE CODE: 9500
BURR CELLS (OLG): ABNORMAL
COLOR UR: ABNORMAL
CROSSMATCH INTERPRETATION: NORMAL
DISPENSE STATUS: NORMAL
ELLIPTOCYTOSIS (OHS): ABNORMAL
ELLIPTOCYTOSIS (OHS): SLIGHT
EOSINOPHIL # BLD AUTO: 0.26 X10(3)/MCL (ref 0–0.9)
EOSINOPHIL # BLD AUTO: 0.43 X10(3)/MCL (ref 0–0.9)
EOSINOPHIL NFR BLD AUTO: 2 %
EOSINOPHIL NFR BLD AUTO: 3.8 %
ERYTHROCYTE [DISTWIDTH] IN BLOOD BY AUTOMATED COUNT: 22.4 % (ref 11.5–17)
ERYTHROCYTE [DISTWIDTH] IN BLOOD BY AUTOMATED COUNT: 23 % (ref 11.5–17)
GLUCOSE UR QL STRIP.AUTO: NORMAL
HCT VFR BLD AUTO: 24.3 % (ref 37–47)
HCT VFR BLD AUTO: 30 % (ref 37–47)
HGB BLD-MCNC: 7.2 G/DL (ref 12–16)
HGB BLD-MCNC: 8.9 G/DL (ref 12–16)
HYALINE CASTS #/AREA URNS LPF: ABNORMAL /LPF
HYPOCHROMIA BLD QL SMEAR: ABNORMAL
HYPOCHROMIA BLD QL SMEAR: SLIGHT
IMM GRANULOCYTES # BLD AUTO: 0.07 X10(3)/MCL (ref 0–0.04)
IMM GRANULOCYTES # BLD AUTO: 0.08 X10(3)/MCL (ref 0–0.04)
IMM GRANULOCYTES NFR BLD AUTO: 0.6 %
IMM GRANULOCYTES NFR BLD AUTO: 0.6 %
INR PPP: 1.2
KETONES UR QL STRIP.AUTO: NEGATIVE
LEUKOCYTE ESTERASE UR QL STRIP.AUTO: 250
LYMPHOCYTES # BLD AUTO: 2.18 X10(3)/MCL (ref 0.6–4.6)
LYMPHOCYTES # BLD AUTO: 2.37 X10(3)/MCL (ref 0.6–4.6)
LYMPHOCYTES NFR BLD AUTO: 17.9 %
LYMPHOCYTES NFR BLD AUTO: 19.2 %
MCH RBC QN AUTO: 20.8 PG (ref 27–31)
MCH RBC QN AUTO: 21.2 PG (ref 27–31)
MCHC RBC AUTO-ENTMCNC: 29.6 G/DL (ref 33–36)
MCHC RBC AUTO-ENTMCNC: 29.7 G/DL (ref 33–36)
MCV RBC AUTO: 70.2 FL (ref 80–94)
MCV RBC AUTO: 71.6 FL (ref 80–94)
MICROCYTES BLD QL SMEAR: ABNORMAL
MONOCYTES # BLD AUTO: 1.08 X10(3)/MCL (ref 0.1–1.3)
MONOCYTES # BLD AUTO: 2.09 X10(3)/MCL (ref 0.1–1.3)
MONOCYTES NFR BLD AUTO: 15.8 %
MONOCYTES NFR BLD AUTO: 9.5 %
MUCOUS THREADS URNS QL MICRO: ABNORMAL /LPF
NEUTROPHILS # BLD AUTO: 7.55 X10(3)/MCL (ref 2.1–9.2)
NEUTROPHILS # BLD AUTO: 8.39 X10(3)/MCL (ref 2.1–9.2)
NEUTROPHILS NFR BLD AUTO: 63.3 %
NEUTROPHILS NFR BLD AUTO: 66.5 %
NITRITE UR QL STRIP.AUTO: NEGATIVE
NRBC BLD AUTO-RTO: 0 %
NRBC BLD AUTO-RTO: 0.2 %
PH UR STRIP.AUTO: 5.5 [PH]
PLATELET # BLD AUTO: 222 X10(3)/MCL (ref 130–400)
PLATELET # BLD AUTO: 240 X10(3)/MCL (ref 130–400)
PLATELET # BLD EST: ADEQUATE 10*3/UL
PLATELET # BLD EST: NORMAL 10*3/UL
PLATELETS.RETICULATED NFR BLD AUTO: 5.7 % (ref 0.9–11.2)
PLATELETS.RETICULATED NFR BLD AUTO: 5.8 % (ref 0.9–11.2)
PMV BLD AUTO: ABNORMAL FL
PMV BLD AUTO: ABNORMAL FL
POIKILOCYTOSIS BLD QL SMEAR: ABNORMAL
POIKILOCYTOSIS BLD QL SMEAR: ABNORMAL
POLYCHROMASIA BLD QL SMEAR: ABNORMAL
PROT UR QL STRIP.AUTO: ABNORMAL
PROTHROMBIN TIME: 14.8 SECONDS (ref 11.4–14)
RBC # BLD AUTO: 3.46 X10(6)/MCL (ref 4.2–5.4)
RBC # BLD AUTO: 4.19 X10(6)/MCL (ref 4.2–5.4)
RBC #/AREA URNS AUTO: ABNORMAL /HPF
RBC MORPH BLD: ABNORMAL
RBC MORPH BLD: ABNORMAL
RBC UR QL AUTO: ABNORMAL
SP GR UR STRIP.AUTO: 1.01
SQUAMOUS #/AREA URNS LPF: ABNORMAL /HPF
UNIT NUMBER: NORMAL
UROBILINOGEN UR STRIP-ACNC: NORMAL
VANCOMYCIN TROUGH SERPL-MCNC: 9.5 UG/ML (ref 15–20)
WBC # SPEC AUTO: 11.36 X10(3)/MCL (ref 4.5–11.5)
WBC # SPEC AUTO: 13.24 X10(3)/MCL (ref 4.5–11.5)
WBC #/AREA URNS AUTO: ABNORMAL /HPF
YEAST BUDDING URNS QL: ABNORMAL /HPF

## 2023-07-25 PROCEDURE — 85025 COMPLETE CBC W/AUTO DIFF WBC: CPT | Performed by: STUDENT IN AN ORGANIZED HEALTH CARE EDUCATION/TRAINING PROGRAM

## 2023-07-25 PROCEDURE — 85610 PROTHROMBIN TIME: CPT

## 2023-07-25 PROCEDURE — 21400001 HC TELEMETRY ROOM

## 2023-07-25 PROCEDURE — C1751 CATH, INF, PER/CENT/MIDLINE: HCPCS

## 2023-07-25 PROCEDURE — 63600175 PHARM REV CODE 636 W HCPCS

## 2023-07-25 PROCEDURE — 81001 URINALYSIS AUTO W/SCOPE: CPT | Performed by: STUDENT IN AN ORGANIZED HEALTH CARE EDUCATION/TRAINING PROGRAM

## 2023-07-25 PROCEDURE — 63600175 PHARM REV CODE 636 W HCPCS: Performed by: STUDENT IN AN ORGANIZED HEALTH CARE EDUCATION/TRAINING PROGRAM

## 2023-07-25 PROCEDURE — 25000003 PHARM REV CODE 250: Performed by: STUDENT IN AN ORGANIZED HEALTH CARE EDUCATION/TRAINING PROGRAM

## 2023-07-25 PROCEDURE — 85730 THROMBOPLASTIN TIME PARTIAL: CPT

## 2023-07-25 PROCEDURE — 94761 N-INVAS EAR/PLS OXIMETRY MLT: CPT

## 2023-07-25 PROCEDURE — A4216 STERILE WATER/SALINE, 10 ML: HCPCS | Performed by: INTERNAL MEDICINE

## 2023-07-25 PROCEDURE — 85025 COMPLETE CBC W/AUTO DIFF WBC: CPT

## 2023-07-25 PROCEDURE — 25000003 PHARM REV CODE 250: Performed by: INTERNAL MEDICINE

## 2023-07-25 PROCEDURE — P9016 RBC LEUKOCYTES REDUCED: HCPCS

## 2023-07-25 PROCEDURE — 85730 THROMBOPLASTIN TIME PARTIAL: CPT | Performed by: INTERNAL MEDICINE

## 2023-07-25 PROCEDURE — 27000221 HC OXYGEN, UP TO 24 HOURS

## 2023-07-25 PROCEDURE — 80202 ASSAY OF VANCOMYCIN: CPT | Performed by: INTERNAL MEDICINE

## 2023-07-25 PROCEDURE — 87088 URINE BACTERIA CULTURE: CPT | Performed by: STUDENT IN AN ORGANIZED HEALTH CARE EDUCATION/TRAINING PROGRAM

## 2023-07-25 RX ORDER — LORAZEPAM 0.5 MG/1
0.5 TABLET ORAL ONCE
Status: DISCONTINUED | OUTPATIENT
Start: 2023-07-25 | End: 2023-07-26

## 2023-07-25 RX ORDER — HEPARIN SODIUM,PORCINE/D5W 25000/250
0-40 INTRAVENOUS SOLUTION INTRAVENOUS CONTINUOUS
Status: DISCONTINUED | OUTPATIENT
Start: 2023-07-25 | End: 2023-07-28

## 2023-07-25 RX ORDER — HYDROCODONE BITARTRATE AND ACETAMINOPHEN 500; 5 MG/1; MG/1
TABLET ORAL
Status: DISCONTINUED | OUTPATIENT
Start: 2023-07-25 | End: 2023-07-31 | Stop reason: HOSPADM

## 2023-07-25 RX ADMIN — PIPERACILLIN AND TAZOBACTAM 4.5 G: 4; .5 INJECTION, POWDER, LYOPHILIZED, FOR SOLUTION INTRAVENOUS; PARENTERAL at 12:07

## 2023-07-25 RX ADMIN — SODIUM CHLORIDE, PRESERVATIVE FREE 10 ML: 5 INJECTION INTRAVENOUS at 11:07

## 2023-07-25 RX ADMIN — SODIUM CHLORIDE: 9 INJECTION, SOLUTION INTRAVENOUS at 12:07

## 2023-07-25 RX ADMIN — SODIUM CHLORIDE, PRESERVATIVE FREE 10 ML: 5 INJECTION INTRAVENOUS at 05:07

## 2023-07-25 RX ADMIN — SODIUM CHLORIDE, PRESERVATIVE FREE 10 ML: 5 INJECTION INTRAVENOUS at 12:07

## 2023-07-25 RX ADMIN — Medication 5 MG: at 09:07

## 2023-07-25 RX ADMIN — PIPERACILLIN AND TAZOBACTAM 4.5 G: 4; .5 INJECTION, POWDER, LYOPHILIZED, FOR SOLUTION INTRAVENOUS; PARENTERAL at 09:07

## 2023-07-25 RX ADMIN — MUPIROCIN: 20 OINTMENT TOPICAL at 10:07

## 2023-07-25 RX ADMIN — PANTOPRAZOLE SODIUM 40 MG: 40 TABLET, DELAYED RELEASE ORAL at 10:07

## 2023-07-25 RX ADMIN — PIPERACILLIN AND TAZOBACTAM 4.5 G: 4; .5 INJECTION, POWDER, LYOPHILIZED, FOR SOLUTION INTRAVENOUS; PARENTERAL at 03:07

## 2023-07-25 RX ADMIN — VANCOMYCIN HYDROCHLORIDE 1500 MG: 1 INJECTION, POWDER, LYOPHILIZED, FOR SOLUTION INTRAVENOUS at 01:07

## 2023-07-25 RX ADMIN — MUPIROCIN 1 G: 20 OINTMENT TOPICAL at 09:07

## 2023-07-25 RX ADMIN — HEPARIN SODIUM 18 UNITS/KG/HR: 10000 INJECTION, SOLUTION INTRAVENOUS at 05:07

## 2023-07-25 NOTE — PLAN OF CARE
Problem: Adult Inpatient Plan of Care  Goal: Plan of Care Review  Outcome: Ongoing, Progressing  Goal: Patient-Specific Goal (Individualized)  Outcome: Ongoing, Progressing  Goal: Absence of Hospital-Acquired Illness or Injury  Outcome: Ongoing, Progressing  Goal: Optimal Comfort and Wellbeing  Outcome: Ongoing, Progressing  Goal: Readiness for Transition of Care  Outcome: Ongoing, Progressing     Problem: Bariatric Environmental Safety  Goal: Safety Maintained with Care  Outcome: Ongoing, Progressing     Problem: Activity Intolerance  Goal: Enhanced Capacity and Energy  Outcome: Ongoing, Progressing     Problem: Skin Injury Risk Increased  Goal: Skin Health and Integrity  Outcome: Ongoing, Progressing     Problem: Infection  Goal: Absence of Infection Signs and Symptoms  Outcome: Ongoing, Progressing

## 2023-07-25 NOTE — PROGRESS NOTES
GYNECOLOGY INPATIENT PROGRESS NOTE    Kely Sparks is a 53 y.o. female   who presented to [facility] ED due to shortness of breath and symptomatic anemia. Now found to have bilateral PEs Gyn consulted for management of symptomatic anemia presumed to be 2/2 known AUB-L. Pt not currently bleeding .She was admitted to  HD3.     Patient overall feeling shortness of breath and fatigued., Denies bleeding overnight. Arie abdominal pain or cramping.     Review of Systems  Denies fevers, chills, headaches nausea, vomiting, dizziness, chest pain.    Medications      [START ON 2023] ferrous sulfate  1 tablet Oral Every other day    lactated ringers  1,000 mL Intravenous Once    LORazepam  0.5 mg Oral Once    mupirocin   Nasal BID    norethindrone  5 mg Oral Daily    pantoprazole  40 mg Oral Daily    piperacillin-tazobactam (Zosyn) IV (PEDS and ADULTS) (extended infusion is not appropriate)  4.5 g Intravenous Q8H    sodium chloride 0.9%  10 mL Intravenous Q6H    vancomycin (VANCOCIN) IV (PEDS and ADULTS)  1,500 mg Intravenous Q12H         heparin (porcine) in D5W 14 Units/kg/hr (23 0516)      sodium chloride 0.9%, sodium chloride 0.9%, acetaminophen, labetalol, melatonin, ondansetron, sodium chloride 0.9%, Flushing PICC Protocol **AND** sodium chloride 0.9% **AND** sodium chloride 0.9%, Pharmacy to dose Vancomycin consult **AND** vancomycin - pharmacy to dose    Objective    Vitals:    23 2330 23 0000 23 0333 23 0500   BP: 135/70  96/62    Pulse: 95  91    Resp: 18  18    Temp: 99 °F (37.2 °C)  98.5 °F (36.9 °C)    TempSrc: Oral  Oral    SpO2: 99% 99% 99%    Weight:    114.3 kg (251 lb 15.8 oz)   Height:           Physical Exam    General: alert and oriented, resting in bed however uncomfortable appearing   Lungs: Taking shallow breaths bilaterally, rales L>R, 4 L of NC   Heart:: Regular rate and rhythm   Abdomen Soft,non-tender ,  bowel sounds   Pelvic: No bleeding    Extremities: No con-tender, trace non-pitting edema bilaterally with taut skin     Labs  Trended Lab Data:  Recent Labs   Lab 07/23/23  1457 07/23/23  1946 07/24/23  0124 07/24/23  0222 07/24/23  0822 07/24/23  1801 07/25/23  0209   WBC 14.86*  --   --  15.44*  --  13.56* 13.24*   HGB 5.3*   < >  --  7.3* 8.3* 7.1* 7.2*   HCT 19.8*   < >  --  25.1* 29.1* 24.3* 24.3*     --   --  214  --  125* 222   MCV 66.7*  --   --  70.3*  --  69.8* 70.2*   RDW 19.0*  --   --  21.5*  --  21.7* 22.4*     --  138  --   --   --   --    K 3.0*  --  4.1  --   --   --   --    CO2 20*  --  17*  --   --   --   --    BUN 9.2*  --  6.4*  --   --   --   --    CALCIUM 8.7  --  8.9  --   --   --   --    ALBUMIN 3.2*  --  3.3*  --   --   --   --    BILITOT 0.7  --  1.4  --   --   --   --    AST 38*  --  44*  --   --   --   --    ALKPHOS 52  --  47  --   --   --   --    ALT 28  --  37  --   --   --   --     < > = values in this interval not displayed.     Imaging :   US Pelvis Comp with Transvag NON-OB (xpd     Result Date: 5/30/2023  EXAMINATION: US PELVIS COMP WITH TRANSVAG NON-OB (XPD) CLINICAL HISTORY: worsening vaginal bleeding, anemic, fibroids 6 months ago; TECHNIQUE: Transabdominal and endovaginal ultrasound of the pelvis. COMPARISON: Ultrasound 05/26/2022 FINDINGS: Poor overall uterine visualization due to decreased sonographic penetration.  Uterus is enlarged with at least 2 suspected fibroids on the transabdominal images.  These measure up to 4.2 and 6.4 cm.  Obscured endometrial stripe.  Ovaries not visualized.  No large volume pelvic ascites. Measurements: Uterus: 11.7 x 9.4 x 8.2 cm Right ovary: Obscured Left ovary: Obscured      1. Limited assessment.  Enlarged uterus with at least 2 suspected fibroids. 2. Ovaries not visualized    Results for orders placed or performed during the hospital encounter of 07/23/23 (from the past 2160 hour(s))   CT Abdomen Pelvis  Without Contrast    Narrative    START OF  REPORT:  Technique: CT of the abdomen and pelvis was performed with axial images as well as sagittal and coronal reconstruction images without intravenous contrast.    Comparison: None available.    Clinical History: Shortness of Breath (Shortness of breath for a few days hx of anemia.    Dosage Information: Automated Exposure Control was utilized.    Findings:  Thorax:  Lungs: Ill-defined ground-glass and airspace opacities are seen in bilateral lower lobes, left greater than right, which may reflect pulmonary infiltrates.  Pleura: Bilateral trace pleural effusions are seen.  Heart: The heart size is within normal limits.  Abdomen:  Abdominal Wall: No abdominal wall pathology is seen.  Liver: The liver appears unremarkable.  Biliary System: No intrahepatic or extrahepatic biliary duct dilatation is seen.  Gallbladder: Surgical clips are seen in the gallbladder fossa consistent with prior cholecystectomy.  Pancreas: The pancreas appears unremarkable.  Spleen: The spleen appears unremarkable.  Adrenals: The adrenal glands appear unremarkable.  Kidneys: Contrast is seen in the kidneys, ureters and urinary bladder, likely from a prior contrast examination. Striated nephrogram is seen in the renal cortices bilaterally(series 2, images 45-52), left greater than right. This is of uncertain significance given contrast timing but could reflectsequelae of chronic infection. There is mild fullness of the left renal collecting system, likely secondary to distal ureteral compression from an enlarged uterus. The kidneys otherwise appear unremarkable.  Aorta: The abdominal aorta appears unremarkable.  IVC: Unremarkable.  Bowel:  Esophagus: The visualized esophagus appears unremarkable.  Stomach: The stomach appears unremarkable.  Duodenum: Unremarkable appearing duodenum.  Small Bowel: The small bowel appears unremarkable.  Colon: Nondistended.  Appendix: No appendix is identified.  Peritoneum: No intraperitoneal free air or  ascites is seen.    Pelvis:  Bladder: The bladder appears unremarkable.  Female:  Uterus: The uterus is enlarged with its cranial extent at the level of the umbilicus and may be secondary to multiple fibroids.  Ovaries: No adnexal masses are seen.  Inguinal Findings: Incidental note is made of a few prominent inguinal lymph nodes bilaterally.    Bony structures:  Dorsal Spine: There is mild spondylosis of the visualized dorsal spine.  Bony Pelvis: The visualized bony structures of the pelvis appear unremarkable.      Impression:  1. Incidental note is made of a few prominent inguinal lymph nodes bilaterally.  2. Contrast is seen in the kidneys, ureters and urinary bladder, likely from a prior contrast examination. Striated nephrogram is seen in the renal cortices bilaterally(series 2, images 45-52), left greater than right. This is of uncertain significance given contrast timing but could reflectsequelae of chronic infection. There is mild fullness of the left renal collecting system, likely secondary to distal ureteral compression from an enlarged uterus. The kidneys otherwise appear unremarkable.  3. The uterus is enlarged with its cranial extent at the level of the umbilicus and may be secondary to multiple fibroids.  4. Ill-defined ground-glass and airspace opacities are seen in bilateral lower lobes, left greater than right, which may reflect pulmonary infiltrates. Correlate clinically as regards further evaluation and followup.  5. Bilateral trace pleural effusions are seen.  6. Details and other findings as discussed above.       CTA Chest Non-Coronary (PE Studies)    Narrative    EXAMINATION:  CTA CHEST NON CORONARY (PE STUDIES)    CLINICAL HISTORY:  Shortness of breath, unknown D-dimer.    TECHNIQUE:  Axial CTA images of the chest were obtained with intravenous contrast utilizing PE protocol.  Coronal and sagittal MIP reformations were obtained. Automated exposure control was utilized. Total exam DLP is 904  mGy cm.    COMPARISON:  Chest radiograph 07/24/2023    FINDINGS:  There is motion artifact of breathing as well as cardiac motion.  There is streak artifact from contrast within the SVC.  There are filling defects demonstrated within the right lower lobe segmental and subsegmental pulmonary arterial branches.  There are suspected filling defects within left lower lobe subsegmental pulmonary arterial branches.  There is no saddle embolus.  There is no CT evidence of right heart strain.  There is small pericardial effusion identified.  Thoracic aorta is not aneurysmal.  No lymphadenopathy is identified within the chest.  No pleural effusion is identified.  There is no consolidation, pneumothorax, or lung mass identified.  Included portions of the upper abdomen demonstrate no acute abnormality.  No acute displaced fractures identified.      Impression    1. There do appear to be pulmonary emboli within right lower lobe segmental and subsegmental branches as well as suspected within left lower lobe subsegmental branches.  Note motion artifact does limit evaluation.  No CT evidence of right heart strain.  2. Epic message sent to and received by Dr. Lashanda Moran at 12:53 hours on 07/24/2023.      Electronically signed by: Sheldon Guevara MD  Date:    07/24/2023  Time:    12:53         Assessment/Plan  53 y.o. HD#3 admitted to management for pulmonary embolism on heparin Pt currently stable on 4L NC.     #AUB-L   Patient previously w/u for AUB-L and hysterectomy for definitive surgical management with our clinic  Pt currently not bleeding  EMB 6/2022 demonstrating benign strips of endometrial and endocervical tissue, Pap 6/2022 NILM/HPV-  She has been on aygestin for the past 6 weeks and has bleeding when on cycle, discussed that anticipate this could be heavier once she does start bleeding. Will also want endometrial sampling when outpatient due to heavy bleeding while on progestin agent  Continue Aygestin 5 mg  at this time  and continue pad count    #Anemia   -  H/H on admission 5.3/19.8--> 2.5 pRBC--> 7.3/25.1 --> 7.2/24.3  -  If patient can tolerate additional unit, would consider at this time, especially given if patient bleeds again she will have heavier bleeding    Medicine to continue management of PE and anemia which should take priority over gyn concerns, will continue to follow along to manage bleeding    Discussed with staff, Dr. Provost Regina Castellano  [employer], PGY4

## 2023-07-25 NOTE — PT/OT/SLP PROGRESS
Physical Therapy    Missed Treatment Session    Patient Name:  Kely Sparks   MRN:  [id number]      Patient not seen at this time secondary to pt resting in bed and receiving 1st of 2 units PRBCs. Pt declined mobility at this time requesting therapist return tomorrow.    Will follow-up as patient is available to participate and as therapists' schedule allows.

## 2023-07-25 NOTE — PROGRESS NOTES
[facility] Medicine Wards   Progress Note     Resident Team: [facility] Medicine List 3  Attending Physician: Destiny Alba MD  Resident: Lashanda Moran DO  Intern: Giovanny Dinh MD  Hospital Length of Stay: 2 days    Subjective:      Brief HPI:  Kely Sparks is a 52 y.o.  female with a history of abnormal uterine bleeding who presented to [facility] ED on 2023  with complaint of feeling short of breath.  Patient reports that she began feeling short of breath on 2023.  She reports that she is a history of symptomatic anemia secondary to abnormal uterine bleeding.  She reports her last menstrual cycle was on  with heavy flow and multiple clots.  She reports that her cycle completed at the beginning of July however she is been spotting since then.  She reports no abnormal uterine discharge, foul odors, rashes, hematuria, hematemesis, hematochezia, melena, bright red blood per rectum, bruises, hemoptysis.  She does report ongoing dyspnea on exertion since onset of symptoms, and reports bilateral lower extremity swelling.  Reports no chest pains, no palpitations, no fevers, no chills, no sick contacts, no trauma, no abdominal pain, no nausea, no vomiting, no diarrhea.  Has no other complaints at this time.  In the ED, found to have H/H of 5.3/19.8.  [facility] was consulted for admission.    Significant Events/Hospital Course:   : CT-PE with  pulmonary emboli within right lower lobe segmental and subsegmental branches as well as suspected within left lower lobe subsegmental branches, started on heparin gtt  : Given 1 unit of blood.    24 hour Interval History:   VSS. Patient currently reporting some shortness of breath and excessive dyspnea on exertion. Continues to endorse anxiety and palpitations. Breathing more rapidly today which patient says happens whenever she ambulates or experiences anxiety. Denies any other complaints at this time.    Review of Systems:  Pertinent items are  noted in HPI.     Objective:     Vital Signs (Most Recent):  Temp: 99 °F (37.2 °C) (07/25/23 1116)  Pulse: 87 (07/25/23 1116)  Resp: 20 (07/25/23 1116)  BP: 123/76 (07/25/23 1116)  SpO2: 100 % (07/25/23 1116) Vital Signs (24h Range):  Temp:  [98.4 °F (36.9 °C)-101.9 °F (38.8 °C)] 99 °F (37.2 °C)  Pulse:  [87-98] 87  Resp:  [18-20] 20  SpO2:  [97 %-100 %] 100 %  BP: ()/(62-96) 123/76       Physical Examination:  General:  Obese female, mildly distressed, breathing rapidly after minimal ambulation  Eye: EOMI, clear conjunctiva, anicteric  HENT: Head-normocephalic and atraumatic, nasal canula in place  Neck:  No gross thyromegaly, trachea midline, supple  Respiratory: Tachypnea, clear to auscultation bilaterally without wheezes, rales, rhonchi  Cardiovascular: regular rate and rhythm without murmurs.  No gallops or rubs no JVD.  Gastrointestinal:  Obese abdomen, soft, nontender, no organomegaly, bowel sounds in all 4 quadrants  Musculoskeletal:  No gross deformities, edema noted 1+ nonpitting to bilateral ankles  Integumentary: no rashes or skin lesions on exposed present  Neurologic: no signs of peripheral neurological deficit, motor/sensory function intact  Psychiatric: AAOx4, CN II-XII grossly intact    Laboratory:  Most Recent Data:  CBC:  Recent Labs   Lab 07/24/23  0222 07/24/23  0822 07/24/23  1801 07/25/23  0209   WBC 15.44*  --  13.56* 13.24*   HGB 7.3* 8.3* 7.1* 7.2*   HCT 25.1* 29.1* 24.3* 24.3*     --  125* 222   MCV 70.3*  --  69.8* 70.2*   RDW 21.5*  --  21.7* 22.4*       CMP:  Recent Labs   Lab 07/23/23  1457 07/24/23  0124   K 3.0* 4.1   CO2 20* 17*   BUN 9.2* 6.4*   CREATININE 0.93 0.88   ALBUMIN 3.2* 3.3*   BILITOT 0.7 1.4   AST 38* 44*   ALKPHOS 52 47   ALT 28 37       Coags:   Recent Labs   Lab 07/23/23  1457 07/24/23  0124 07/24/23  1801   INR  --   --  1.25   LABPROT 7.4 7.7  --        DM:   Recent Labs   Lab 07/23/23  1457 07/24/23  0124   CREATININE 0.93 0.88       Cardiac:   Recent  Labs   Lab 07/23/23  1457   TROPONINI 0.012   BNP 57.3       Pulm:     Recent Labs   Lab 07/24/23  0050   PO2 71.0*        Urinalysis:   Lab Results   Component Value Date    LABURIN (A) 10/19/2022     >/= 100,000 colonies/ml Streptococcus agalactiae (Group B)    APPEARANCEUA Turbid (A) 07/25/2023    PROTEINUA Trace (A) 07/25/2023    UROBILINOGEN Normal 07/25/2023    BILIRUBINUA Negative 07/25/2023    RBCUA 50-99 (A) 07/25/2023    WBCUA 11-20 (A) 07/25/2023       Trended Cardiac Data:  Recent Labs   Lab 07/23/23  1457   TROPONINI 0.012   BNP 57.3         Microbiology Data Reviewed: yes  Pertinent Findings:  Blood cx NGTD  Urine cx pending    Other Results:  EKG (my interpretation): sinus tachycardia on admission. No repeat EKG needed to date.    Radiology:  Imaging Results              CTA Chest Non-Coronary (PE Studies) (Final result)  Result time 07/24/23 12:53:58      Final result by Sheldon Guevara MD (07/24/23 12:53:58)                   Impression:      1. There do appear to be pulmonary emboli within right lower lobe segmental and subsegmental branches as well as suspected within left lower lobe subsegmental branches.  Note motion artifact does limit evaluation.  No CT evidence of right heart strain.  2. Epic message sent to and received by Dr. Lashanda Moran at 12:53 hours on 07/24/2023.      Electronically signed by: Sheldon Guevara MD  Date:    07/24/2023  Time:    12:53               Narrative:    EXAMINATION:  CTA CHEST NON CORONARY (PE STUDIES)    CLINICAL HISTORY:  Shortness of breath, unknown D-dimer.    TECHNIQUE:  Axial CTA images of the chest were obtained with intravenous contrast utilizing PE protocol.  Coronal and sagittal MIP reformations were obtained. Automated exposure control was utilized. Total exam DLP is 904 mGy cm.    COMPARISON:  Chest radiograph 07/24/2023    FINDINGS:  There is motion artifact of breathing as well as cardiac motion.  There is streak artifact from contrast within the SVC.   There are filling defects demonstrated within the right lower lobe segmental and subsegmental pulmonary arterial branches.  There are suspected filling defects within left lower lobe subsegmental pulmonary arterial branches.  There is no saddle embolus.  There is no CT evidence of right heart strain.  There is small pericardial effusion identified.  Thoracic aorta is not aneurysmal.  No lymphadenopathy is identified within the chest.  No pleural effusion is identified.  There is no consolidation, pneumothorax, or lung mass identified.  Included portions of the upper abdomen demonstrate no acute abnormality.  No acute displaced fractures identified.                                       X-Ray Chest AP Portable (Final result)  Result time 07/23/23 15:30:31      Final result by Rodrigue Obrien MD (07/23/23 15:30:31)                   Impression:      No acute cardiopulmonary process.      Electronically signed by: Rodrigue Obrien  Date:    07/23/2023  Time:    15:30               Narrative:    EXAMINATION:  XR CHEST AP PORTABLE    CLINICAL HISTORY:  SOB;    TECHNIQUE:  Single view of the chest    COMPARISON:  03/03/2015    FINDINGS:  No focal opacification, pleural effusion, or pneumothorax.    The cardiac silhouette remains prominent.    No acute osseous abnormality.                                      Current Medications:     Infusions:   heparin (porcine) in D5W 16 Units/kg/hr (07/25/23 1255)        Scheduled:   [START ON 7/26/2023] ferrous sulfate  1 tablet Oral Every other day    lactated ringers  1,000 mL Intravenous Once    LORazepam  0.5 mg Oral Once    mupirocin   Nasal BID    norethindrone  5 mg Oral Daily    pantoprazole  40 mg Oral Daily    piperacillin-tazobactam (Zosyn) IV (PEDS and ADULTS) (extended infusion is not appropriate)  4.5 g Intravenous Q8H    sodium chloride 0.9%  10 mL Intravenous Q6H    vancomycin (VANCOCIN) IV (PEDS and ADULTS)  1,500 mg Intravenous Q12H        PRN:  sodium chloride,  sodium chloride 0.9%, sodium chloride 0.9%, acetaminophen, labetalol, melatonin, ondansetron, sodium chloride 0.9%, Flushing PICC Protocol **AND** sodium chloride 0.9% **AND** sodium chloride 0.9%, Pharmacy to dose Vancomycin consult **AND** vancomycin - pharmacy to dose    Antibiotics and Day Number of Therapy:  Vanco/Zosyn (day 3)      Intake/Output Summary (Last 24 hours) at 7/25/2023 1346  Last data filed at 7/25/2023 1255  Gross per 24 hour   Intake 1251.09 ml   Output --   Net 1251.09 ml         Lines/Drains/Airways       Peripherally Inserted Central Catheter Line  Duration             PICC Double Lumen 07/24/23 1543 right brachial <1 day                      Assessment & Plan:     Bilateral Pulmonary Emboli  Acute Hypoxic Respiratory Failure  -CT-PE with pulmonary emboli within right lower lobe segmental and subsegmental branches as well as suspected within left lower lobe subsegmental branches.  -Continue low dose heparin drip given initial presentation with significant anemia and abnormal uterine bleeding  -Continue oxygen therapy for goal SPO2 >92%  -Patient transfer to outside facility requested today as patient will likely need the services of both inpatient IR and Hematology. Anticipate patient will need an IVC filter placed to manage clot burden in the setting of continued acute respiratory failure. Patient would also benefit from inpatient Hematology for work up of possible malignancy as cause of hypercoagulability.    Symptomatic microcytic anemia  Abnormal uterine bleeding  - gyn consulted, appreciate their assistance   -gyn desires continuance of home meds as tx for AUB  - Hgb 5.3 on admit, Hgb 7.2 this a.m.  - transfused 1 unit of pRBCs today  - currently hemodynamically stable  - Type and screen, blood consent obtained  - Transfuse for Hemoglobin <7  - hx of MISTI, will continue supplementation   - will need precautions during anticoagulation, and should patient begin to bleed profusely will need  to trend hnh closely and discuss with OB for plan of hemostasis     SIRS 4 or 4  - after initial admission, patient resulted as SIRS 3 or 4 with T-max of 102.2°, pulse 130's, RR greater than 20, leukocytosis at 15  - improved however still intermittently positive  -will pan culture, ngtd, UA pending  -broad-spectrum coverage vanco/Zosyn (day 3)  - fevers may be 2/2 to #1     Hypokalemia  -will replete electrolytes as needed  -monitor with a.m. BMP     Hyperchloremic non-anion gap metabolic acidosis  - will monitor after pRBC transfusion       CODE STATUS: Full Code   Access: PICC  Antibiotics: Vanco/Zosyn (day 2)   Analgesia: acetaminophen  Diet: Diet Adult Regular  DVT Prophylaxis: Heparin  GI Prophylaxis: proton pump inhibitor per orders  O2: Nasal Cannula 3 LPM  Fluids: none    Consults: OB/GYN    Disposition: day 2 of admission for  bilateral pulmonary emboli and symptomatic anemia, on heparin GTT, transfused 4 units PRBC total to date. Attempting transfer to outside facility for higher level of care.    Giovanny Dinh MD  [facility], -  07/25/2023

## 2023-07-26 LAB
ANION GAP SERPL CALC-SCNC: 8 MEQ/L
APTT PPP: 54.4 SECONDS
APTT PPP: 57.7 SECONDS
APTT PPP: 64.2 SECONDS
APTT PPP: 75.7 SECONDS
BASOPHILS # BLD AUTO: 0.09 X10(3)/MCL
BASOPHILS NFR BLD AUTO: 0.7 %
BUN SERPL-MCNC: 6 MG/DL (ref 9.8–20.1)
CALCIUM SERPL-MCNC: 8.5 MG/DL (ref 8.4–10.2)
CHLORIDE SERPL-SCNC: 110 MMOL/L (ref 98–107)
CO2 SERPL-SCNC: 24 MMOL/L (ref 22–29)
CREAT SERPL-MCNC: 1.14 MG/DL (ref 0.55–1.02)
CREAT/UREA NIT SERPL: 5
EOSINOPHIL # BLD AUTO: 0.5 X10(3)/MCL (ref 0–0.9)
EOSINOPHIL NFR BLD AUTO: 4 %
ERYTHROCYTE [DISTWIDTH] IN BLOOD BY AUTOMATED COUNT: 23.7 % (ref 11.5–17)
GFR SERPLBLD CREATININE-BSD FMLA CKD-EPI: 58 MLS/MIN/1.73/M2
GLUCOSE SERPL-MCNC: 104 MG/DL (ref 74–100)
HCT VFR BLD AUTO: 28.1 % (ref 37–47)
HGB BLD-MCNC: 8.3 G/DL (ref 12–16)
IMM GRANULOCYTES # BLD AUTO: 0.22 X10(3)/MCL (ref 0–0.04)
IMM GRANULOCYTES NFR BLD AUTO: 1.7 %
LYMPHOCYTES # BLD AUTO: 2.05 X10(3)/MCL (ref 0.6–4.6)
LYMPHOCYTES NFR BLD AUTO: 16.3 %
MCH RBC QN AUTO: 21.2 PG (ref 27–31)
MCHC RBC AUTO-ENTMCNC: 29.5 G/DL (ref 33–36)
MCV RBC AUTO: 71.7 FL (ref 80–94)
MONOCYTES # BLD AUTO: 1.39 X10(3)/MCL (ref 0.1–1.3)
MONOCYTES NFR BLD AUTO: 11 %
NEUTROPHILS # BLD AUTO: 8.35 X10(3)/MCL (ref 2.1–9.2)
NEUTROPHILS NFR BLD AUTO: 66.3 %
NRBC BLD AUTO-RTO: 0 %
PLATELET # BLD AUTO: 253 X10(3)/MCL (ref 130–400)
PLATELETS.RETICULATED NFR BLD AUTO: 5.5 % (ref 0.9–11.2)
PMV BLD AUTO: ABNORMAL FL
POTASSIUM SERPL-SCNC: 3.6 MMOL/L (ref 3.5–5.1)
RBC # BLD AUTO: 3.92 X10(6)/MCL (ref 4.2–5.4)
SODIUM SERPL-SCNC: 142 MMOL/L (ref 136–145)
WBC # SPEC AUTO: 12.6 X10(3)/MCL (ref 4.5–11.5)

## 2023-07-26 PROCEDURE — 25000003 PHARM REV CODE 250

## 2023-07-26 PROCEDURE — 21400001 HC TELEMETRY ROOM

## 2023-07-26 PROCEDURE — 97162 PT EVAL MOD COMPLEX 30 MIN: CPT

## 2023-07-26 PROCEDURE — 25000003 PHARM REV CODE 250: Performed by: INTERNAL MEDICINE

## 2023-07-26 PROCEDURE — 27000221 HC OXYGEN, UP TO 24 HOURS

## 2023-07-26 PROCEDURE — 94761 N-INVAS EAR/PLS OXIMETRY MLT: CPT

## 2023-07-26 PROCEDURE — 25000003 PHARM REV CODE 250: Performed by: STUDENT IN AN ORGANIZED HEALTH CARE EDUCATION/TRAINING PROGRAM

## 2023-07-26 PROCEDURE — 85730 THROMBOPLASTIN TIME PARTIAL: CPT | Performed by: INTERNAL MEDICINE

## 2023-07-26 PROCEDURE — P9016 RBC LEUKOCYTES REDUCED: HCPCS | Performed by: EMERGENCY MEDICINE

## 2023-07-26 PROCEDURE — 63600175 PHARM REV CODE 636 W HCPCS

## 2023-07-26 PROCEDURE — 85025 COMPLETE CBC W/AUTO DIFF WBC: CPT

## 2023-07-26 PROCEDURE — 36430 TRANSFUSION BLD/BLD COMPNT: CPT

## 2023-07-26 PROCEDURE — 63600175 PHARM REV CODE 636 W HCPCS: Performed by: STUDENT IN AN ORGANIZED HEALTH CARE EDUCATION/TRAINING PROGRAM

## 2023-07-26 PROCEDURE — 80048 BASIC METABOLIC PNL TOTAL CA: CPT

## 2023-07-26 PROCEDURE — A4216 STERILE WATER/SALINE, 10 ML: HCPCS | Performed by: INTERNAL MEDICINE

## 2023-07-26 RX ORDER — HYDROCODONE BITARTRATE AND ACETAMINOPHEN 500; 5 MG/1; MG/1
TABLET ORAL
Status: DISCONTINUED | OUTPATIENT
Start: 2023-07-26 | End: 2023-07-31 | Stop reason: HOSPADM

## 2023-07-26 RX ORDER — LANOLIN ALCOHOL/MO/W.PET/CERES
1 CREAM (GRAM) TOPICAL DAILY
Status: DISCONTINUED | OUTPATIENT
Start: 2023-07-27 | End: 2023-07-26

## 2023-07-26 RX ORDER — LANOLIN ALCOHOL/MO/W.PET/CERES
1 CREAM (GRAM) TOPICAL 2 TIMES DAILY
Status: DISCONTINUED | OUTPATIENT
Start: 2023-07-26 | End: 2023-07-27

## 2023-07-26 RX ADMIN — SODIUM CHLORIDE 125 MG: 9 INJECTION, SOLUTION INTRAVENOUS at 06:07

## 2023-07-26 RX ADMIN — FERROUS SULFATE TAB 325 MG (65 MG ELEMENTAL FE) 1 EACH: 325 (65 FE) TAB at 09:07

## 2023-07-26 RX ADMIN — VANCOMYCIN HYDROCHLORIDE 1500 MG: 1 INJECTION, POWDER, LYOPHILIZED, FOR SOLUTION INTRAVENOUS at 03:07

## 2023-07-26 RX ADMIN — PIPERACILLIN AND TAZOBACTAM 4.5 G: 4; .5 INJECTION, POWDER, LYOPHILIZED, FOR SOLUTION INTRAVENOUS; PARENTERAL at 09:07

## 2023-07-26 RX ADMIN — HEPARIN SODIUM 22 UNITS/KG/HR: 10000 INJECTION, SOLUTION INTRAVENOUS at 09:07

## 2023-07-26 RX ADMIN — MUPIROCIN: 20 OINTMENT TOPICAL at 09:07

## 2023-07-26 RX ADMIN — SODIUM CHLORIDE: 9 INJECTION, SOLUTION INTRAVENOUS at 02:07

## 2023-07-26 RX ADMIN — PIPERACILLIN AND TAZOBACTAM 4.5 G: 4; .5 INJECTION, POWDER, LYOPHILIZED, FOR SOLUTION INTRAVENOUS; PARENTERAL at 04:07

## 2023-07-26 RX ADMIN — PIPERACILLIN AND TAZOBACTAM 4.5 G: 4; .5 INJECTION, POWDER, LYOPHILIZED, FOR SOLUTION INTRAVENOUS; PARENTERAL at 12:07

## 2023-07-26 RX ADMIN — MUPIROCIN: 20 OINTMENT TOPICAL at 10:07

## 2023-07-26 RX ADMIN — FERROUS SULFATE TAB EC 325 MG (65 MG FE EQUIVALENT) 1 EACH: 325 (65 FE) TABLET DELAYED RESPONSE at 09:07

## 2023-07-26 RX ADMIN — SODIUM CHLORIDE, PRESERVATIVE FREE 10 ML: 5 INJECTION INTRAVENOUS at 06:07

## 2023-07-26 RX ADMIN — Medication 5 MG: at 09:07

## 2023-07-26 RX ADMIN — PANTOPRAZOLE SODIUM 40 MG: 40 TABLET, DELAYED RELEASE ORAL at 09:07

## 2023-07-26 NOTE — PROGRESS NOTES
Pharmacokinetic Assessment Follow Up: IV Vancomycin      Vancomycin Regimen Plan:  Patient continues to refuse labs.  Adjusting dosing to a q24h 2000mg.  Will give 2 doses then recheck level with am labs on 7/28/23      Drug levels (last 3 results):  Recent Labs   Lab Result Units 07/25/23 1957   Vancomycin Trough ug/ml 9.5*       Pharmacy will continue to follow and monitor vancomycin.    Please contact pharmacy at extension [phone] for questions regarding this assessment.    Thank you for the consult,   Ana Laura Dorman       Patient brief summary:  Kely Sparks is a 53 y.o. female initiated on antimicrobial therapy with IV Vancomycin for treatment of bacteremia    The patient's current regimen is 2000mg q24h    Drug Allergies:   Review of patient's allergies indicates:   Allergen Reactions    Shellfish containing products Hives and Itching       Actual Body Weight:   114.3kg    Renal Function:   Estimated Creatinine Clearance: 86.2 mL/min (based on SCr of 0.88 mg/dL).,       CBC (last 72 hours):  Recent Labs   Lab Result Units 07/23/23  1457 07/23/23  1946 07/24/23  0222 07/24/23  0822 07/24/23  1801 07/25/23  0209 07/25/23 1957 07/26/23  0711   WBC x10(3)/mcL 14.86*  --  15.44*  --  13.56* 13.24* 11.36 12.60*   Hgb g/dL 5.3* 6.6* 7.3* 8.3* 7.1* 7.2* 8.9* 8.3*   Hct % 19.8* 24.5* 25.1* 29.1* 24.3* 24.3* 30.0* 28.1*   Platelet x10(3)/mcL 269  --  214  --  125* 222 240 253   Mono % % 12.4  --  8.9  --  12.3 15.8 9.5 11.0   Eos % % 0.4  --  0.4  --  1.0 2.0 3.8 4.0   Basophil % % 0.2  --  0.2  --  0.4 0.4 0.4 0.7       Metabolic Panel (last 72 hours):  Recent Labs   Lab Result Units 07/23/23  1457 07/24/23  0124 07/24/23  0226 07/25/23  0853   Sodium Level mmol/L 137 138  --   --    Potassium Level mmol/L 3.0* 4.1  --   --    Chloride mmol/L 105 108*  --   --    Carbon Dioxide mmol/L 20* 17*  --   --    Glucose Level mg/dL 101* 105*  --   --    Glucose, UA   --   --   --  Normal   Blood Urea Nitrogen  mg/dL 9.2* 6.4*  --   --    Creatinine mg/dL 0.93 0.88  --   --    Albumin Level g/dL 3.2* 3.3*  --   --    Bilirubin Total mg/dL 0.7 1.4  --   --    Alkaline Phosphatase unit/L 52 47  --   --    Aspartate Aminotransferase unit/L 38* 44*  --   --    Alanine Aminotransferase unit/L 28 37  --   --    Magnesium Level mg/dL  --   --  1.90  --    Phosphorus Level mg/dL  --   --  2.7  --        Vancomycin Administrations:  vancomycin given in the last 96 hours                     vancomycin (VANCOCIN) 1,500 mg in dextrose 5 % (D5W) 250 mL IVPB (mg) 1,500 mg New Bag 07/26/23 0334     1,500 mg New Bag 07/25/23 0159    vancomycin (VANCOCIN) 1,250 mg in dextrose 5 % (D5W) 250 mL IVPB (mg) 1,250 mg New Bag 07/24/23 1345    vancomycin 750 mg in dextrose 5 % (D5W) 250 mL IVPB (mg) 750 mg New Bag 07/24/23 0842     750 mg New Bag 07/23/23 2123                    Microbiologic Results:  Microbiology Results (last 7 days)       Procedure Component Value Units Date/Time    Urine culture [[id number]] Collected: 07/25/23 0853    Order Status: Completed Specimen: Urine Updated: 07/26/23 0650     Urine Culture No Growth At 24 Hours    Blood Culture [[id number]]  (Normal) Collected: 07/23/23 1946    Order Status: Completed Specimen: Blood Updated: 07/25/23 1100     CULTURE, BLOOD ([facility]) No Growth At 24 Hours    Blood Culture [[id number]]  (Normal) Collected: 07/23/23 1946    Order Status: Completed Specimen: Blood Updated: 07/25/23 1100     CULTURE, BLOOD ([facility]) No Growth At 24 Hours

## 2023-07-26 NOTE — PT/OT/SLP EVAL
Physical Therapy Evaluation    Patient Name:  Kely Sparks   MRN:  [id number]    Recommendations:     Discharge Recommendations:  home   Discharge Equipment Recommendations: none   Equipment to be obtained for discharge: none.  Barriers to discharge:  ongoing medical needs    Assessment:     Kely Sparks is a 53 y.o. female admitted with a medical diagnosis of Iron deficiency anemia due to chronic blood loss. Pt with SANIYA pulmonary emboli on heparin drip.  She presents with the following impairments/functional limitations:  impaired endurance, impaired functional mobility, gait instability, impaired cardiopulmonary response to activity.    Rehab Prognosis: Good.    Patient would benefit from continued skilled acute PT services to: address above listed impairments/functional limitations; receive patient/caregiver education; reduce fall risk; and maximize independency/safety with functional mobility.    Recent Surgery: * No surgery found *      Plan:     During this hospitalization, patient to be seen 3 x/week to address the identified impairments/functional limitations via gait training, therapeutic activities, therapeutic exercises and progress toward the established goals.    Plan of Care Expires:  08/26/23    Subjective     Communicated with patient's nurse Jacqueline prior to session.    Patient agreeable to participate in evaluation.     Chief Complaint: fatigue  Patient/Family Comments/goals: return to independence  Pain/Comfort:  Pain Rating 1: 0/10  Pain Addressed 1: Nurse notified  Pain Rating Post-Intervention 1: 0/10    Patients cultural, spiritual, Spiritism conflicts given the current situation: no    Social History  Living Environment: Patient lives with their daughter in a single level home, with 4 steps steps outside, with left handrail, with tub-shower combo.  Functional Level: Prior to admission patient was employed, was driving, ambulated without assistive device, was  independent in ADL's, and was independent in IADL's.  Equipment Used at Home: none  Equipment owned (not currently used): none.  Assistance Upon Discharge: family.    Objective:     Patient found supine in bed and with HOB elevated with central line, oxygen, telemetry  upon PT entry to room.    General Precautions: Standard, fall   Orthopedic Precautions:N/A   Braces: N/A  Respiratory Status: 2 liters/min O2 via nasal cannula  Pt removed O2 for ambulation to bathroom Sats remained stability >92%  Vitals   At Rest (pre-session)  BP  122/74   HR  82   O2 Sat %  100%      With Activity (post-session)  BP    HR    O2 Sat %  98%     Exams:  Orientation: Patient is oriented to Person, Place, Time, Situation  Commands: Patient follows commands consistently and follows multi-step verbal commands  Gross Motor Coordination:  WFL  RUE Strength: WFL  LUE Strength: WFL  RLE ROM: WFL  RLE Strength: WFL  LLE ROM: WFL  LLE Strength: WFL    Functional Mobility:    Bed Mobility:  Rolling Left: supervision  Supine to Sit: supervision    Transfers:  Sit to Stand: supervision with no assistive device  Toilet Transfer: supervision with no assistive device using Step Transfer    Gait:  Patient ambulated 130ft with no assistive device and stand by assistance.  Patient demonstrates decreased reta, decreased step length, and wide base of support.    Other Mobility:  not assessed    Balance:  Sit  Static: NORMAL: No deviations seen in posture held statically  Dynamic: NORMAL: No deviations seen in posture held dynamically  Stand  Static: FAIR+: Takes MINIMAL challenges from all directions  Dynamic: FAIR+: Needs CLOSE SUPERVISION during gait and is able to right self with minor LOB    Patient left sitting in chair with all lines intact and call button in reach.    Education     Patient was instructed to utilize staff assistance for mobility/transfers.  White board updated regarding patient's safest level of mobility with staff  assistance.    Goals     Multidisciplinary Problems       Physical Therapy Goals          Problem: Physical Therapy    Goal Priority Disciplines Outcome Goal Variances Interventions   Physical Therapy Goal     PT, PT/OT Ongoing, Progressing     Description: Goals to be met by: 2023     Patient will increase functional independence with mobility by performin. Gait  x 300 feet with Isabella using No Assistive Device.   2. Ascend/descend 4 stair with left Handrails Isabella using No Assistive Device.                        History:     Past Medical History:   Diagnosis Date    Abnormal Pap smear of cervix     Anemia     Chronic back pain     Chronic hypertension     Obesity, Class III, BMI 40-49.9 (morbid obesity)      Past Surgical History:   Procedure Laterality Date     SECTION      x2    CHOLECYSTECTOMY      HYSTEROSCOPY WITH DILATION AND CURETTAGE OF UTERUS N/A 2022    Procedure: HYSTEROSCOPY, WITH DILATION AND CURETTAGE OF UTERUS;  Surgeon: Susie Thibodeaux MD;  Location: [facility];  Service: OB/GYN;  Laterality: N/A;  **MYOSURE**    TUBAL LIGATION       Time Tracking:     PT Received On: 23  PT Start Time: 931     PT Stop Time: 1003  PT Total Time (min): 32 min     Billable Minutes: Evaluation moderate    2023

## 2023-07-26 NOTE — PROGRESS NOTES
[facility] Medicine Wards   Progress Note     Resident Team: [facility] Medicine List 3  Attending Physician: Destiny Alba MD  Resident: Lashanda Moran DO  Intern: Giovanny Dinh MD  Hospital Length of Stay: 3 days    Subjective:      Brief HPI:  Kely Sparks is a 52 y.o.  female with a history of abnormal uterine bleeding who presented to [facility] ED on 2023  with complaint of feeling short of breath.  Patient reports that she began feeling short of breath on 2023.  She reports that she is a history of symptomatic anemia secondary to abnormal uterine bleeding.  She reports her last menstrual cycle was on  with heavy flow and multiple clots.  She reports that her cycle completed at the beginning of July however she is been spotting since then.  She reports no abnormal uterine discharge, foul odors, rashes, hematuria, hematemesis, hematochezia, melena, bright red blood per rectum, bruises, hemoptysis.  She does report ongoing dyspnea on exertion since onset of symptoms, and reports bilateral lower extremity swelling.  Reports no chest pains, no palpitations, no fevers, no chills, no sick contacts, no trauma, no abdominal pain, no nausea, no vomiting, no diarrhea.  Has no other complaints at this time.  In the ED, found to have H/H of 5.3/19.8.  [facility] was consulted for admission.    Significant Events/Hospital Course:   : Given 3 units of blood  : CT-PE with  pulmonary emboli within right lower lobe segmental and subsegmental branches as well as suspected within left lower lobe subsegmental branches, started on heparin gtt  : Given 1 unit of blood. Increased to high intensity IV Heparin as patient was not clinically improving from respiratory standpoint. Attempting transfer to higher level of care facility.  : Patient markedly improved after switching to high intensity IV Heparin. Attempting transfer to higher level of care facility.    24 hour Interval History:    VSS. Afebrile. BP well controlled. Patient no longer experiencing sob at rest nor with ambulation. No longer endorses anxiety and palpitations. Endorses mild vaginal spotting which patient associates with her menstruation. She otherwise denies any other complaints at this time.    Review of Systems:  Pertinent items are noted in HPI.     Objective:     Vital Signs (Most Recent):  Temp: 97.9 °F (36.6 °C) (07/26/23 1112)  Pulse: 84 (07/26/23 1112)  Resp: 18 (07/26/23 0933)  BP: (!) 144/84 (07/26/23 1112)  SpO2: 100 % (07/26/23 1112) Vital Signs (24h Range):  Temp:  [97.9 °F (36.6 °C)-99.2 °F (37.3 °C)] 97.9 °F (36.6 °C)  Pulse:  [79-91] 84  Resp:  [16-18] 18  SpO2:  [96 %-100 %] 100 %  BP: (110-144)/(67-84) 144/84       Physical Examination:  General:  Obese female, NAD  Eye: EOMI, clear conjunctiva, anicteric  HENT: Head-normocephalic and atraumatic, nasal canula in place  Neck:  No gross thyromegaly, trachea midline, supple  Respiratory: clear to auscultation bilaterally without wheezes, rales, rhonchi  Cardiovascular: regular rate and rhythm without murmurs.  No gallops or rubs no JVD.  Gastrointestinal:  Obese abdomen, soft, nontender, no organomegaly, bowel sounds in all 4 quadrants  Musculoskeletal:  No gross deformities, edema noted 1+ nonpitting to bilateral ankles  Integumentary: no rashes or skin lesions on exposed present  Neurologic: no signs of peripheral neurological deficit, motor/sensory function intact  Psychiatric: AAOx4, CN II-XII grossly intact    Laboratory:  Most Recent Data:  CBC:  Recent Labs   Lab 07/25/23  0209 07/25/23 1957 07/26/23  0711   WBC 13.24* 11.36 12.60*   HGB 7.2* 8.9* 8.3*   HCT 24.3* 30.0* 28.1*    240 253   MCV 70.2* 71.6* 71.7*   RDW 22.4* 23.0* 23.7*       CMP:  Recent Labs   Lab 07/23/23  1457 07/24/23  0124   K 3.0* 4.1   CO2 20* 17*   BUN 9.2* 6.4*   CREATININE 0.93 0.88   ALBUMIN 3.2* 3.3*   BILITOT 0.7 1.4   AST 38* 44*   ALKPHOS 52 47   ALT 28 37       Coags:    Recent Labs   Lab 07/23/23  1457 07/24/23  0124 07/24/23  1801 07/25/23  1957   INR  --   --  1.25 1.2   LABPROT 7.4 7.7  --   --        DM:   Recent Labs   Lab 07/23/23  1457 07/24/23  0124   CREATININE 0.93 0.88       Cardiac:   Recent Labs   Lab 07/23/23  1457   TROPONINI 0.012   BNP 57.3       Pulm:     Recent Labs   Lab 07/24/23  0050   PO2 71.0*        Urinalysis:   Lab Results   Component Value Date    LABURIN No Growth At 24 Hours 07/25/2023    APPEARANCEUA Turbid (A) 07/25/2023    PROTEINUA Trace (A) 07/25/2023    UROBILINOGEN Normal 07/25/2023    BILIRUBINUA Negative 07/25/2023    RBCUA 50-99 (A) 07/25/2023    WBCUA 11-20 (A) 07/25/2023       Trended Cardiac Data:  Recent Labs   Lab 07/23/23  1457   TROPONINI 0.012   BNP 57.3         Microbiology Data Reviewed: yes  Pertinent Findings:  Blood cx NGTD  Urine cx pending    Other Results:  EKG (my interpretation): sinus tachycardia on admission. No repeat EKG needed to date.    Radiology:  Imaging Results              CTA Chest Non-Coronary (PE Studies) (Final result)  Result time 07/24/23 12:53:58      Final result by Sheldon Guevara MD (07/24/23 12:53:58)                   Impression:      1. There do appear to be pulmonary emboli within right lower lobe segmental and subsegmental branches as well as suspected within left lower lobe subsegmental branches.  Note motion artifact does limit evaluation.  No CT evidence of right heart strain.  2. Epic message sent to and received by Dr. Lashanda Moran at 12:53 hours on 07/24/2023.      Electronically signed by: Sheldon Guevara MD  Date:    07/24/2023  Time:    12:53               Narrative:    EXAMINATION:  CTA CHEST NON CORONARY (PE STUDIES)    CLINICAL HISTORY:  Shortness of breath, unknown D-dimer.    TECHNIQUE:  Axial CTA images of the chest were obtained with intravenous contrast utilizing PE protocol.  Coronal and sagittal MIP reformations were obtained. Automated exposure control was utilized. Total exam DLP  is 904 mGy cm.    COMPARISON:  Chest radiograph 07/24/2023    FINDINGS:  There is motion artifact of breathing as well as cardiac motion.  There is streak artifact from contrast within the SVC.  There are filling defects demonstrated within the right lower lobe segmental and subsegmental pulmonary arterial branches.  There are suspected filling defects within left lower lobe subsegmental pulmonary arterial branches.  There is no saddle embolus.  There is no CT evidence of right heart strain.  There is small pericardial effusion identified.  Thoracic aorta is not aneurysmal.  No lymphadenopathy is identified within the chest.  No pleural effusion is identified.  There is no consolidation, pneumothorax, or lung mass identified.  Included portions of the upper abdomen demonstrate no acute abnormality.  No acute displaced fractures identified.                                       X-Ray Chest AP Portable (Final result)  Result time 07/23/23 15:30:31      Final result by Rodrigue Obrien MD (07/23/23 15:30:31)                   Impression:      No acute cardiopulmonary process.      Electronically signed by: Rodrigue Obrien  Date:    07/23/2023  Time:    15:30               Narrative:    EXAMINATION:  XR CHEST AP PORTABLE    CLINICAL HISTORY:  SOB;    TECHNIQUE:  Single view of the chest    COMPARISON:  03/03/2015    FINDINGS:  No focal opacification, pleural effusion, or pneumothorax.    The cardiac silhouette remains prominent.    No acute osseous abnormality.                                      Current Medications:     Infusions:   heparin (porcine) in D5W 22 Units/kg/hr (07/26/23 1057)        Scheduled:   ferrous sulfate  1 tablet Oral Every other day    lactated ringers  1,000 mL Intravenous Once    LORazepam  0.5 mg Oral Once    mupirocin   Nasal BID    norethindrone  5 mg Oral Daily    pantoprazole  40 mg Oral Daily    piperacillin-tazobactam (Zosyn) IV (PEDS and ADULTS) (extended infusion is not appropriate)   4.5 g Intravenous Q8H    sodium chloride 0.9%  10 mL Intravenous Q6H    [START ON 7/27/2023] vancomycin (VANCOCIN) IV (PEDS and ADULTS)  2,000 mg Intravenous Q24H        PRN:  sodium chloride, sodium chloride 0.9%, sodium chloride 0.9%, acetaminophen, heparin (PORCINE), heparin (PORCINE), labetalol, melatonin, ondansetron, sodium chloride 0.9%, Flushing PICC Protocol **AND** sodium chloride 0.9% **AND** sodium chloride 0.9%, Pharmacy to dose Vancomycin consult **AND** vancomycin - pharmacy to dose    Antibiotics and Day Number of Therapy:  Vanco/Zosyn (day 3)      Intake/Output Summary (Last 24 hours) at 7/26/2023 1147  Last data filed at 7/26/2023 1057  Gross per 24 hour   Intake 1319.02 ml   Output --   Net 1319.02 ml         Lines/Drains/Airways       Peripherally Inserted Central Catheter Line  Duration             PICC Double Lumen 07/24/23 1543 right brachial 1 day                      Assessment & Plan:     Bilateral Pulmonary Emboli  Acute Hypoxic Respiratory Failure  -CT-PE with pulmonary emboli within right lower lobe segmental and subsegmental branches as well as suspected within left lower lobe subsegmental branches.  -Continue low dose heparin drip given initial presentation with significant anemia and abnormal uterine bleeding  -Continue oxygen therapy for goal SPO2 >92%  -Patient transfer to outside facility requested as patient will likely need the services of both inpatient IR and Hematology. Anticipate patient will need an IVC filter placed to manage clot burden in the setting of continued acute respiratory failure. Patient would also benefit from inpatient Hematology for work up of possible malignancy as cause of hypercoagulability.  -Transfer attempt in process    Symptomatic microcytic anemia  Abnormal uterine bleeding  - gyn consulted, appreciate their assistance   -gyn desires continuance of home meds as tx for AUB  - Hgb 5.3 on admit, Hgb 8.3 this a.m.  - currently hemodynamically stable  -no  transfusion indicated thus far today  - Transfuse for Hemoglobin <7  - hx of MISTI, will continue supplementation  -Will continue to trend H&H  - will need precautions during anticoagulation, and should patient begin to bleed profusely will need to trend hnh closely and discuss with OB for plan of hemostasis     SIRS  -Now 0 out 4   --Blood cx NTD  -Urine cx NTD  -Spiking of fever likely due to b/l PE  -Will continue broad-spectrum coverage vanco/Zosyn (day 4) until cx negative at 72 hours     Hypokalemia  -K 4.1  -Will continue to monitor with BMP     Hyperchloremic non-anion gap metabolic acidosis  - S/p transfusion of 4 units thus far  -Will check BMP      CODE STATUS: Full Code   Access: PICC  Antibiotics: Vanco/Zosyn (day 4)   Analgesia: acetaminophen  Diet: Diet Adult Regular  DVT Prophylaxis: Heparin  GI Prophylaxis: proton pump inhibitor per orders  O2: Nasal Cannula 3 LPM  Fluids: none    Consults: OB/GYN    Disposition: day 3 of admission for  bilateral pulmonary emboli and symptomatic anemia, on heparin GTT, transfused 4 units PRBC total to date. Attempting transfer to outside facility for higher level of care.    Giovanny Dinh MD  [facility], [facility]  07/26/2023

## 2023-07-26 NOTE — PLAN OF CARE
Problem: Adult Inpatient Plan of Care  Goal: Plan of Care Review  Outcome: Ongoing, Progressing  Flowsheets (Taken 7/26/2023 1828)  Plan of Care Reviewed With: patient  Goal: Patient-Specific Goal (Individualized)  Outcome: Ongoing, Progressing  Goal: Absence of Hospital-Acquired Illness or Injury  Outcome: Ongoing, Progressing  Intervention: Identify and Manage Fall Risk  Flowsheets (Taken 7/26/2023 1828)  Safety Promotion/Fall Prevention:   assistive device/personal item within reach   side rails raised x 2  Intervention: Prevent and Manage VTE (Venous Thromboembolism) Risk  Flowsheets (Taken 7/26/2023 1828)  VTE Prevention/Management: remove, assess skin, and reapply sequential compression device  Goal: Optimal Comfort and Wellbeing  Outcome: Ongoing, Progressing  Goal: Readiness for Transition of Care  Outcome: Ongoing, Progressing

## 2023-07-26 NOTE — PROGRESS NOTES
GYNECOLOGY INPATIENT PROGRESS NOTE    Kely Sparks is a 53 y.o. female   who presented to [facility] ED due to shortness of breath and symptomatic anemia. Now found to have bilateral PEs Gyn consulted for management of symptomatic anemia presumed to be 2/2 known AUB-L. Pt not currently bleeding .She was admitted to  HD4.     Patient overall feeling shortness of breath and fatigued.Endorses scant spotting overnight.  Arie abdominal pain or cramping.     Review of Systems  Denies fevers, chills, headaches nausea, vomiting, dizziness, chest pain.    Medications      ferrous sulfate  1 tablet Oral Every other day    lactated ringers  1,000 mL Intravenous Once    LORazepam  0.5 mg Oral Once    mupirocin   Nasal BID    norethindrone  5 mg Oral Daily    pantoprazole  40 mg Oral Daily    piperacillin-tazobactam (Zosyn) IV (PEDS and ADULTS) (extended infusion is not appropriate)  4.5 g Intravenous Q8H    sodium chloride 0.9%  10 mL Intravenous Q6H    vancomycin (VANCOCIN) IV (PEDS and ADULTS)  1,500 mg Intravenous Q12H         heparin (porcine) in D5W 20 Units/kg/hr (23 0630)      sodium chloride, sodium chloride 0.9%, sodium chloride 0.9%, acetaminophen, heparin (PORCINE), heparin (PORCINE), labetalol, melatonin, ondansetron, sodium chloride 0.9%, Flushing PICC Protocol **AND** sodium chloride 0.9% **AND** sodium chloride 0.9%, Pharmacy to dose Vancomycin consult **AND** vancomycin - pharmacy to dose    Objective    Vitals:    23 1942 23 2328 23 0329   BP: 125/77  129/69 127/67   Pulse: 89  88 79   Resp: 18  18 18   Temp: 99.2 °F (37.3 °C)  98.8 °F (37.1 °C)    TempSrc: Oral  Oral    SpO2: 100% 98% 100% 100%   Weight:       Height:           Physical Exam    General: alert and oriented, resting in bed however uncomfortable appearing   Lungs: Taking shallow breaths bilaterally, rales appreciated in bilateral lower lobes, 3 L of NC   Heart:: Regular rate and rhythm    Abdomen Soft,non-tender ,  bowel sounds   Pelvic: Chucks pad  with scant drops pink dilute blood.   Extremities: No con-tender, trace non-pitting edema bilaterally with taut skin     Labs  Trended Lab Data:  Recent Labs   Lab 07/23/23  1457 07/23/23  1946 07/24/23  0124 07/24/23  0222 07/24/23  1801 07/25/23  0209 07/25/23 1957   WBC 14.86*  --   --    < > 13.56* 13.24* 11.36   HGB 5.3*   < >  --    < > 7.1* 7.2* 8.9*   HCT 19.8*   < >  --    < > 24.3* 24.3* 30.0*     --   --    < > 125* 222 240   MCV 66.7*  --   --    < > 69.8* 70.2* 71.6*   RDW 19.0*  --   --    < > 21.7* 22.4* 23.0*     --  138  --   --   --   --    K 3.0*  --  4.1  --   --   --   --    CO2 20*  --  17*  --   --   --   --    BUN 9.2*  --  6.4*  --   --   --   --    CALCIUM 8.7  --  8.9  --   --   --   --    ALBUMIN 3.2*  --  3.3*  --   --   --   --    BILITOT 0.7  --  1.4  --   --   --   --    AST 38*  --  44*  --   --   --   --    ALKPHOS 52  --  47  --   --   --   --    ALT 28  --  37  --   --   --   --     < > = values in this interval not displayed.       Imaging :   US Pelvis Comp with Transvag NON-OB (xpd     Result Date: 5/30/2023  EXAMINATION: US PELVIS COMP WITH TRANSVAG NON-OB (XPD) CLINICAL HISTORY: worsening vaginal bleeding, anemic, fibroids 6 months ago; TECHNIQUE: Transabdominal and endovaginal ultrasound of the pelvis. COMPARISON: Ultrasound 05/26/2022 FINDINGS: Poor overall uterine visualization due to decreased sonographic penetration.  Uterus is enlarged with at least 2 suspected fibroids on the transabdominal images.  These measure up to 4.2 and 6.4 cm.  Obscured endometrial stripe.  Ovaries not visualized.  No large volume pelvic ascites. Measurements: Uterus: 11.7 x 9.4 x 8.2 cm Right ovary: Obscured Left ovary: Obscured      1. Limited assessment.  Enlarged uterus with at least 2 suspected fibroids. 2. Ovaries not visualized    Results for orders placed or performed during the hospital encounter of 07/23/23  (from the past 2160 hour(s))   CT Abdomen Pelvis  Without Contrast    Narrative    EXAMINATION:  CT ABDOMEN PELVIS WITHOUT CONTRAST    CLINICAL HISTORY:  Fever of unknown origin (Ped > 1mo); Fever, unspecified    TECHNIQUE:  Helically acquired axial images, sagittal and coronal reformations were obtained from the lung bases to the pubic symphysis without the IV administration of contrast.    Automated tube current modulation, weight-based exposure dosing, and/or iterative reconstruction technique utilized to reach lowest reasonably achievable exposure rate.    DLP: 698 mGy*cm    COMPARISON:  CTA chest 07/24/2023, pelvic sonogram 05/30/2023    FINDINGS:  HEART: Cardiomegaly.    LUNG BASES: Trace pleural fluid bilaterally.  Basilar atelectasis.    LIVER: Normal attenuation. No appreciable focal hepatic lesion.    BILIARY: Gallbladder is surgically absent.    PANCREAS: No inflammatory change.    SPLEEN: 12 cm.  Upper limits of normal in size.    ADRENALS: No mass.    KIDNEYS/URETERS: Patchy retention of contrast at the renal parenchyma, left greater than right.  This is related to contrast enhanced CTA of the chest 07/24/2023.  Extrarenal pelvis on the left.  Mild pelvocaliectasis on the left.    GI TRACT/MESENTERY: Evaluation of the bowel is limited without contrast.  No evidence of bowel obstruction or inflammation.    PERITONEUM: No free fluid.No free air.    LYMPH NODES: Mildly prominent inguinal lymph nodes, most commonly reactive.    VASCULATURE: No significant atherosclerosis or aneurysm.    BLADDER: Normal appearance given degree of distention.    REPRODUCTIVE ORGANS: Globular enlargement of the uterus with large, 10 cm pedunculated lesion at the uterine fundus/left adnexa.    ABDOMINAL WALL: Unremarkable.    BONES: Degenerative change at the lumbar spine.      Impression    1. Patchy retention of contrast at the kidneys may be related to acute renal injury, pyelonephritis or less likely obstructive change.   Mild left pelvicaliectasis likely related to compression of the left ureter at the pelvic brim by the enlarged uterus.  2. Globular enlargement of the uterus with large pedunculated lesion at the fundus/left adnexa suggestive of fibroids.  This is poorly characterized without contrast.  3.  The preliminary and final reports are concordant.      Electronically signed by: Luci Andre  Date:    07/25/2023  Time:    12:04   CTA Chest Non-Coronary (PE Studies)    Narrative    EXAMINATION:  CTA CHEST NON CORONARY (PE STUDIES)    CLINICAL HISTORY:  Shortness of breath, unknown D-dimer.    TECHNIQUE:  Axial CTA images of the chest were obtained with intravenous contrast utilizing PE protocol.  Coronal and sagittal MIP reformations were obtained. Automated exposure control was utilized. Total exam DLP is 904 mGy cm.    COMPARISON:  Chest radiograph 07/24/2023    FINDINGS:  There is motion artifact of breathing as well as cardiac motion.  There is streak artifact from contrast within the SVC.  There are filling defects demonstrated within the right lower lobe segmental and subsegmental pulmonary arterial branches.  There are suspected filling defects within left lower lobe subsegmental pulmonary arterial branches.  There is no saddle embolus.  There is no CT evidence of right heart strain.  There is small pericardial effusion identified.  Thoracic aorta is not aneurysmal.  No lymphadenopathy is identified within the chest.  No pleural effusion is identified.  There is no consolidation, pneumothorax, or lung mass identified.  Included portions of the upper abdomen demonstrate no acute abnormality.  No acute displaced fractures identified.      Impression    1. There do appear to be pulmonary emboli within right lower lobe segmental and subsegmental branches as well as suspected within left lower lobe subsegmental branches.  Note motion artifact does limit evaluation.  No CT evidence of right heart strain.  2. Epic message  sent to and received by Dr. Lashanda Moran at 12:53 hours on 07/24/2023.      Electronically signed by: Sheldon Guevara MD  Date:    07/24/2023  Time:    12:53         Assessment/Plan  53 y.o. HD#4 admitted to management for pulmonary embolism on heparin Pt currently stable on 3L NC.     #AUB-L   Patient previously w/u for AUB-L and hysterectomy for definitive surgical management with our clinic  Pt currently not bleeding  EMB 6/2022 demonstrating benign strips of endometrial and endocervical tissue, Pap 6/2022 NILM/HPV-  She has been on aygestin for the past 6 weeks and has bleeding when on cycle, discussed that anticipate this could be heavier once she does start bleeding. Will also want endometrial sampling in outpatient setting due to heavy bleeding while on progestin agent  Continue Aygestin 5 mg  at this time and continue pad count    #Anemia   -  H/H on admission 5.3/19.8--> 2.5 pRBC--> 7.3/25.1 --> 7.2/24.3-->pending this AM  -  If patient can tolerate additional unit, would consider at this time, especially given if patient bleeds again she will have heavier bleeding    Medicine to continue management of PE and anemia which should take priority over gyn concerns, will continue to follow along to manage bleeding    Discussed with staff, Dr. Provost Urmila Lentz MD   [facility], PGY2

## 2023-07-26 NOTE — PLAN OF CARE
Spoke to Ramya in [facility] transfer center, P: [phone]. [facility] is on diversion; patient remains on wait list.

## 2023-07-26 NOTE — PLAN OF CARE
Spoke to Dr. Alba and transfer has been cancelled. Prior to this the following was done:    Radiology disc was printed.     Spoke to Arina segal [facility] in [place], P:[phone]- no beds available.  [facility] OZZIE- P: [phone]. Referral packet faxed to: [phone]. No beds available. Spoke to Pedro.  [facility], P: [phone]. [phone]. Referral packet faxed, F: [phone]. Spoke to Noa. No beds available at this time.

## 2023-07-26 NOTE — PLAN OF CARE
Problem: Physical Therapy  Goal: Physical Therapy Goal  Description: Goals to be met by: 2023     Patient will increase functional independence with mobility by performin. Gait  x 300 feet with [place] using No Assistive Device.   2. Ascend/descend 4 stair with left Handrails [place] using No Assistive Device.     Outcome: Ongoing, Progressing

## 2023-07-26 NOTE — PT/OT/SLP PROGRESS
Physical Therapy    Missed Treatment Session    Patient Name:  Kely Sparks   MRN:  [id number]      Patient not seen at this time secondary to pt politely declined additional therapy session today. She just returned to bed after sitting up in chair all morning.    Will follow-up as patient is available to participate and as therapists' schedule allows.

## 2023-07-27 LAB
ABO + RH BLD: NORMAL
ANION GAP SERPL CALC-SCNC: 10 MEQ/L
ANISOCYTOSIS BLD QL SMEAR: ABNORMAL
APTT PPP: 72.4 SECONDS
BACTERIA UR CULT: NO GROWTH
BASOPHILS # BLD AUTO: 0.08 X10(3)/MCL
BASOPHILS NFR BLD AUTO: 0.6 %
BLD PROD TYP BPU: NORMAL
BLOOD UNIT EXPIRATION DATE: NORMAL
BLOOD UNIT TYPE CODE: 5100
BUN SERPL-MCNC: 6.4 MG/DL (ref 9.8–20.1)
CALCIUM SERPL-MCNC: 8.6 MG/DL (ref 8.4–10.2)
CHLORIDE SERPL-SCNC: 111 MMOL/L (ref 98–107)
CO2 SERPL-SCNC: 21 MMOL/L (ref 22–29)
CREAT SERPL-MCNC: 0.94 MG/DL (ref 0.55–1.02)
CREAT/UREA NIT SERPL: 7
CROSSMATCH INTERPRETATION: NORMAL
DISPENSE STATUS: NORMAL
EOSINOPHIL # BLD AUTO: 0.49 X10(3)/MCL (ref 0–0.9)
EOSINOPHIL NFR BLD AUTO: 3.7 %
ERYTHROCYTE [DISTWIDTH] IN BLOOD BY AUTOMATED COUNT: 24.9 % (ref 11.5–17)
GFR SERPLBLD CREATININE-BSD FMLA CKD-EPI: >60 MLS/MIN/1.73/M2
GLUCOSE SERPL-MCNC: 89 MG/DL (ref 74–100)
HCT VFR BLD AUTO: 31.1 % (ref 37–47)
HGB BLD-MCNC: 9 G/DL (ref 12–16)
HYPOCHROMIA BLD QL SMEAR: ABNORMAL
IMM GRANULOCYTES # BLD AUTO: 0.2 X10(3)/MCL (ref 0–0.04)
IMM GRANULOCYTES NFR BLD AUTO: 1.5 %
LYMPHOCYTES # BLD AUTO: 2.17 X10(3)/MCL (ref 0.6–4.6)
LYMPHOCYTES NFR BLD AUTO: 16.3 %
MCH RBC QN AUTO: 21.1 PG (ref 27–31)
MCHC RBC AUTO-ENTMCNC: 28.9 G/DL (ref 33–36)
MCV RBC AUTO: 73 FL (ref 80–94)
MONOCYTES # BLD AUTO: 1.3 X10(3)/MCL (ref 0.1–1.3)
MONOCYTES NFR BLD AUTO: 9.8 %
NEUTROPHILS # BLD AUTO: 9.08 X10(3)/MCL (ref 2.1–9.2)
NEUTROPHILS NFR BLD AUTO: 68.1 %
NRBC BLD AUTO-RTO: 0 %
PLATELET # BLD AUTO: 255 X10(3)/MCL (ref 130–400)
PLATELET # BLD EST: NORMAL 10*3/UL
PLATELETS.RETICULATED NFR BLD AUTO: 6.8 % (ref 0.9–11.2)
PMV BLD AUTO: ABNORMAL FL
POIKILOCYTOSIS BLD QL SMEAR: ABNORMAL
POTASSIUM SERPL-SCNC: 3.7 MMOL/L (ref 3.5–5.1)
RBC # BLD AUTO: 4.26 X10(6)/MCL (ref 4.2–5.4)
RBC MORPH BLD: ABNORMAL
RBCS: NORMAL
SODIUM SERPL-SCNC: 142 MMOL/L (ref 136–145)
UNIT NUMBER: NORMAL
VANCOMYCIN SERPL-MCNC: 7.3 UG/ML (ref 15–20)
WBC # SPEC AUTO: 13.32 X10(3)/MCL (ref 4.5–11.5)

## 2023-07-27 PROCEDURE — 25000003 PHARM REV CODE 250

## 2023-07-27 PROCEDURE — 63600175 PHARM REV CODE 636 W HCPCS

## 2023-07-27 PROCEDURE — 63600175 PHARM REV CODE 636 W HCPCS: Performed by: STUDENT IN AN ORGANIZED HEALTH CARE EDUCATION/TRAINING PROGRAM

## 2023-07-27 PROCEDURE — 99900035 HC TECH TIME PER 15 MIN (STAT)

## 2023-07-27 PROCEDURE — 85025 COMPLETE CBC W/AUTO DIFF WBC: CPT

## 2023-07-27 PROCEDURE — 80202 ASSAY OF VANCOMYCIN: CPT | Performed by: INTERNAL MEDICINE

## 2023-07-27 PROCEDURE — 85730 THROMBOPLASTIN TIME PARTIAL: CPT

## 2023-07-27 PROCEDURE — 25000003 PHARM REV CODE 250: Performed by: STUDENT IN AN ORGANIZED HEALTH CARE EDUCATION/TRAINING PROGRAM

## 2023-07-27 PROCEDURE — 80048 BASIC METABOLIC PNL TOTAL CA: CPT

## 2023-07-27 PROCEDURE — 36569 INSJ PICC 5 YR+ W/O IMAGING: CPT

## 2023-07-27 PROCEDURE — 94761 N-INVAS EAR/PLS OXIMETRY MLT: CPT

## 2023-07-27 PROCEDURE — A4216 STERILE WATER/SALINE, 10 ML: HCPCS | Performed by: INTERNAL MEDICINE

## 2023-07-27 PROCEDURE — 21400001 HC TELEMETRY ROOM

## 2023-07-27 PROCEDURE — 25000003 PHARM REV CODE 250: Performed by: INTERNAL MEDICINE

## 2023-07-27 RX ORDER — LANOLIN ALCOHOL/MO/W.PET/CERES
1 CREAM (GRAM) TOPICAL EVERY OTHER DAY
Status: DISCONTINUED | OUTPATIENT
Start: 2023-07-29 | End: 2023-07-31 | Stop reason: HOSPADM

## 2023-07-27 RX ADMIN — MUPIROCIN: 20 OINTMENT TOPICAL at 09:07

## 2023-07-27 RX ADMIN — PIPERACILLIN AND TAZOBACTAM 4.5 G: 4; .5 INJECTION, POWDER, LYOPHILIZED, FOR SOLUTION INTRAVENOUS; PARENTERAL at 02:07

## 2023-07-27 RX ADMIN — MUPIROCIN: 20 OINTMENT TOPICAL at 08:07

## 2023-07-27 RX ADMIN — SODIUM CHLORIDE, PRESERVATIVE FREE 10 ML: 5 INJECTION INTRAVENOUS at 11:07

## 2023-07-27 RX ADMIN — HEPARIN SODIUM 22 UNITS/KG/HR: 10000 INJECTION, SOLUTION INTRAVENOUS at 12:07

## 2023-07-27 RX ADMIN — SODIUM CHLORIDE, PRESERVATIVE FREE 10 ML: 5 INJECTION INTRAVENOUS at 06:07

## 2023-07-27 RX ADMIN — VANCOMYCIN HYDROCHLORIDE 2000 MG: 1 INJECTION, POWDER, LYOPHILIZED, FOR SOLUTION INTRAVENOUS at 08:07

## 2023-07-27 RX ADMIN — PANTOPRAZOLE SODIUM 40 MG: 40 TABLET, DELAYED RELEASE ORAL at 08:07

## 2023-07-27 RX ADMIN — HEPARIN SODIUM 22 UNITS/KG/HR: 10000 INJECTION, SOLUTION INTRAVENOUS at 05:07

## 2023-07-27 RX ADMIN — FERROUS SULFATE TAB 325 MG (65 MG ELEMENTAL FE) 1 EACH: 325 (65 FE) TAB at 08:07

## 2023-07-27 RX ADMIN — SODIUM CHLORIDE, PRESERVATIVE FREE 10 ML: 5 INJECTION INTRAVENOUS at 01:07

## 2023-07-27 NOTE — PROGRESS NOTES
GYNECOLOGY INPATIENT PROGRESS NOTE    Kely Sparks is a 53 y.o. female   who presented to [facility] ED due to shortness of breath and symptomatic anemia. Now found to have bilateral PEs Gyn consulted for management of symptomatic anemia presumed to be 2/2 known AUB-L. Pt not currently bleeding .She was admitted to  HD5.     Pt asleep this morning. States that she is feeling better and denies vaginal bleeding. Denies abdominal pain     Review of Systems  Denies fevers, chills, headaches nausea, vomiting, dizziness, chest pain.    Medications      ferrous sulfate  1 tablet Oral BID    lactated ringers  1,000 mL Intravenous Once    mupirocin   Nasal BID    norethindrone  5 mg Oral Daily    pantoprazole  40 mg Oral Daily    piperacillin-tazobactam (Zosyn) IV (PEDS and ADULTS) (extended infusion is not appropriate)  4.5 g Intravenous Q8H    sodium chloride 0.9%  10 mL Intravenous Q6H    vancomycin (VANCOCIN) IV (PEDS and ADULTS)  2,000 mg Intravenous Q24H         heparin (porcine) in D5W 22 Units/kg/hr (23 0058)      sodium chloride, sodium chloride, sodium chloride 0.9%, sodium chloride 0.9%, acetaminophen, heparin (PORCINE), heparin (PORCINE), labetalol, melatonin, ondansetron, sodium chloride 0.9%, Flushing PICC Protocol **AND** sodium chloride 0.9% **AND** sodium chloride 0.9%, Pharmacy to dose Vancomycin consult **AND** vancomycin - pharmacy to dose    Objective    Vitals:    23 2000 23 0000 23 0012 23 0425   BP: (!) 130/90  132/76 117/67   Pulse: 78  83 77   Resp: 18  20 20   Temp: 98.1 °F (36.7 °C)  98.6 °F (37 °C) 98.4 °F (36.9 °C)   TempSrc:   Oral Oral   SpO2: 98% 98% 100% 98%   Weight:       Height:           Physical Exam    General: alert and oriented, resting in bed however uncomfortable appearing   Lungs: Taking shallow breaths bilaterally, rales appreciated in bilateral lower lobes, 3 L of NC   Heart:: Regular rate and rhythm   Abdomen Soft,non-tender ,   bowel sounds   Pelvic: Deferred    Extremities: No con-tender, trace non-pitting edema bilaterally with taut skin     Labs  Trended Lab Data:  Recent Labs   Lab 07/23/23  1457 07/23/23  1946 07/24/23  0124 07/24/23  0222 07/25/23 1957 07/26/23  0711 07/26/23  1317 07/27/23  0419   WBC 14.86*  --   --    < > 11.36 12.60*  --  13.32*   HGB 5.3*   < >  --    < > 8.9* 8.3*  --  9.0*   HCT 19.8*   < >  --    < > 30.0* 28.1*  --  31.1*     --   --    < > 240 253  --  255   MCV 66.7*  --   --    < > 71.6* 71.7*  --  73.0*   RDW 19.0*  --   --    < > 23.0* 23.7*  --  24.9*     --  138  --   --   --  142 142   K 3.0*  --  4.1  --   --   --  3.6 3.7   CO2 20*  --  17*  --   --   --  24 21*   BUN 9.2*  --  6.4*  --   --   --  6.0* 6.4*   CALCIUM 8.7  --  8.9  --   --   --  8.5 8.6   ALBUMIN 3.2*  --  3.3*  --   --   --   --   --    BILITOT 0.7  --  1.4  --   --   --   --   --    AST 38*  --  44*  --   --   --   --   --    ALKPHOS 52  --  47  --   --   --   --   --    ALT 28  --  37  --   --   --   --   --     < > = values in this interval not displayed.       Imaging :   US Pelvis Comp with Transvag NON-OB (xpd     Result Date: 5/30/2023  EXAMINATION: US PELVIS COMP WITH TRANSVAG NON-OB (XPD) CLINICAL HISTORY: worsening vaginal bleeding, anemic, fibroids 6 months ago; TECHNIQUE: Transabdominal and endovaginal ultrasound of the pelvis. COMPARISON: Ultrasound 05/26/2022 FINDINGS: Poor overall uterine visualization due to decreased sonographic penetration.  Uterus is enlarged with at least 2 suspected fibroids on the transabdominal images.  These measure up to 4.2 and 6.4 cm.  Obscured endometrial stripe.  Ovaries not visualized.  No large volume pelvic ascites. Measurements: Uterus: 11.7 x 9.4 x 8.2 cm Right ovary: Obscured Left ovary: Obscured      1. Limited assessment.  Enlarged uterus with at least 2 suspected fibroids. 2. Ovaries not visualized    Results for orders placed or performed during the hospital  encounter of 07/23/23 (from the past 2160 hour(s))   CT Abdomen Pelvis  Without Contrast    Narrative    EXAMINATION:  CT ABDOMEN PELVIS WITHOUT CONTRAST    CLINICAL HISTORY:  Fever of unknown origin (Ped > 1mo); Fever, unspecified    TECHNIQUE:  Helically acquired axial images, sagittal and coronal reformations were obtained from the lung bases to the pubic symphysis without the IV administration of contrast.    Automated tube current modulation, weight-based exposure dosing, and/or iterative reconstruction technique utilized to reach lowest reasonably achievable exposure rate.    DLP: 698 mGy*cm    COMPARISON:  CTA chest 07/24/2023, pelvic sonogram 05/30/2023    FINDINGS:  HEART: Cardiomegaly.    LUNG BASES: Trace pleural fluid bilaterally.  Basilar atelectasis.    LIVER: Normal attenuation. No appreciable focal hepatic lesion.    BILIARY: Gallbladder is surgically absent.    PANCREAS: No inflammatory change.    SPLEEN: 12 cm.  Upper limits of normal in size.    ADRENALS: No mass.    KIDNEYS/URETERS: Patchy retention of contrast at the renal parenchyma, left greater than right.  This is related to contrast enhanced CTA of the chest 07/24/2023.  Extrarenal pelvis on the left.  Mild pelvocaliectasis on the left.    GI TRACT/MESENTERY: Evaluation of the bowel is limited without contrast.  No evidence of bowel obstruction or inflammation.    PERITONEUM: No free fluid.No free air.    LYMPH NODES: Mildly prominent inguinal lymph nodes, most commonly reactive.    VASCULATURE: No significant atherosclerosis or aneurysm.    BLADDER: Normal appearance given degree of distention.    REPRODUCTIVE ORGANS: Globular enlargement of the uterus with large, 10 cm pedunculated lesion at the uterine fundus/left adnexa.    ABDOMINAL WALL: Unremarkable.    BONES: Degenerative change at the lumbar spine.      Impression    1. Patchy retention of contrast at the kidneys may be related to acute renal injury, pyelonephritis or less likely  obstructive change.  Mild left pelvicaliectasis likely related to compression of the left ureter at the pelvic brim by the enlarged uterus.  2. Globular enlargement of the uterus with large pedunculated lesion at the fundus/left adnexa suggestive of fibroids.  This is poorly characterized without contrast.  3.  The preliminary and final reports are concordant.      Electronically signed by: Luci Andre  Date:    07/25/2023  Time:    12:04   CTA Chest Non-Coronary (PE Studies)    Narrative    EXAMINATION:  CTA CHEST NON CORONARY (PE STUDIES)    CLINICAL HISTORY:  Shortness of breath, unknown D-dimer.    TECHNIQUE:  Axial CTA images of the chest were obtained with intravenous contrast utilizing PE protocol.  Coronal and sagittal MIP reformations were obtained. Automated exposure control was utilized. Total exam DLP is 904 mGy cm.    COMPARISON:  Chest radiograph 07/24/2023    FINDINGS:  There is motion artifact of breathing as well as cardiac motion.  There is streak artifact from contrast within the SVC.  There are filling defects demonstrated within the right lower lobe segmental and subsegmental pulmonary arterial branches.  There are suspected filling defects within left lower lobe subsegmental pulmonary arterial branches.  There is no saddle embolus.  There is no CT evidence of right heart strain.  There is small pericardial effusion identified.  Thoracic aorta is not aneurysmal.  No lymphadenopathy is identified within the chest.  No pleural effusion is identified.  There is no consolidation, pneumothorax, or lung mass identified.  Included portions of the upper abdomen demonstrate no acute abnormality.  No acute displaced fractures identified.      Impression    1. There do appear to be pulmonary emboli within right lower lobe segmental and subsegmental branches as well as suspected within left lower lobe subsegmental branches.  Note motion artifact does limit evaluation.  No CT evidence of right heart  strain.  2. Epic message sent to and received by Dr. Lashanda Moran at 12:53 hours on 07/24/2023.      Electronically signed by: Sheldon Guevara MD  Date:    07/24/2023  Time:    12:53         Assessment/Plan  53 y.o. HD#5 admitted to management for pulmonary embolism on heparin Pt currently stable on 3L NC.       Interval Events:  - While in house diagnosed with Pes,    This afternoon patient feels much improved. She continues to really have no significant bleeding and only occasional spotting. Following discussion with interdisciplinary meeting, discussed with patient the following.     - Do not recommend with proceeding with [facility] for sampling or Hysterectomy at this time   - Will recommend holding aygestin at this time given theoretical clot risk and current Pes, given that she is not bleeding right now will try to reduce clot risk and assess bleeding pattern  - Will continue to recommend transfusion PRN  - Will monitor bleeding closely as aygestin is discontinued and continued on long term AC  - Also discussed with patient, need to perform pelvic exam with possible EMB pending visualization, will try to do prior to leaving the hospital     Patient understands and agrees with plan      #AUB-L   Patient previously w/u for AUB-L and hysterectomy for definitive surgical management with our clinic  Pt currently not bleeding  EMB 6/2022 demonstrating benign strips of endometrial and endocervical tissue, Pap 6/2022 NILM/HPV-  DVT studies negative, continued on heparin  She has received a total of 4 units of pRBC while in house   s/p multi-disciplinary meeting to discuss Aygestin usage. Decided to discontinue Aygestin treatment in the setting of Pes  Continue pad counts   #Anemia   -  H/H on admission 5.3/19.8---> 7.3/25.1 --> 7.2/24.3--> 8.3/28.1-->9.0/31.  -  Overall, pt has received 4 upRBCs  - Continue to monitor     Medicine to continue management of PE and anemia which should take priority over gyn concerns, will  continue to follow along to manage bleeding    Discussed with staff, Dr. Provost Urmila Lentz MD   [employer] [facility], PGY2

## 2023-07-27 NOTE — PT/OT/SLP PROGRESS
Physical Therapy    Missed Treatment Session    Patient Name:  Kely Sparks   MRN:  [id number]      Patient not seen at this time secondary to just returning to bed. Reports she walked 2 rounds with nursing today and felt more stable than yesterday.    Will follow-up as patient is available to participate and as therapists' schedule allows.

## 2023-07-27 NOTE — PROGRESS NOTES
Inpatient Nutrition Evaluation    Admit Date: 2023   Total duration of encounter: 4 days    Nutrition Recommendation/Prescription     Regular diet  Fe supplementation  Monitor Weights Weekly     Nutrition Assessment     Chart Review    Reason Seen: length of stay    Malnutrition Screening Tool Results   Have you recently lost weight without trying?: No  Have you been eating poorly because of a decreased appetite?: No   MST Score: 0     Diagnosis:  Pulmonary embolism, Anemia, AUB    Relevant Medical History: AUB    Nutrition-Related Medications: Fe Sulfate, Protonix, Zosyn, Vanc    Nutrition-Related Labs:  23 -- H/H  L, Glu 89, Na 142, K 3.7, BUN 6.4 L, cr 0.94    Diet Order: Diet Adult Regular  Oral Supplement Order: none  Appetite/Oral Intake: good/% of meals  Factors Affecting Nutritional Intake: none identified  Food/Mormonism/Cultural Preferences: none reported  Food Allergies: shellfish       Wound(s):   skin intact    Comments    23 -- Pt reports good appetite, no difficulty eating; no n/v; LBM ; H/H (L) AUB, s/p PRBC, on FE supplementation; denies wt loss    Anthropometrics    Height: 5' (152.4 cm) Height Method: Stated  Last Weight: 121.5 kg (267 lb 14.4 oz) (23 0600) Weight Method: Standard Scale  BMI (Calculated): 52.3  BMI Classification: obese grade III (BMI >/=40)     Ideal Body Weight (IBW), Female: 100 lb     % Ideal Body Weight, Female (lb): 251 %                    Usual Body Weight (UBW), k kg  % Usual Body Weight: 106.82     Usual Weight Provided By: EMR weight history    Wt Readings from Last 5 Encounters:   23 121.5 kg (267 lb 14.4 oz)   23 112.9 kg (249 lb)   23 114.4 kg (252 lb 3.3 oz)   23 119.3 kg (263 lb)   23 120.2 kg (264 lb 15.9 oz)     Weight Change(s) Since Admission:  Admit Weight: 114.3 kg (251 lb 15.8 oz) (23 1426)      Patient Education    Not applicable.    Monitoring & Evaluation     Dietitian will  monitor energy intake, weight change, electrolyte/renal panel, and gastrointestinal profile.  Nutrition Risk/Follow-Up: low (follow-up in 5-7 days)  Patients assigned 'low nutrition risk' status do not qualify for a full nutritional assessment but will be monitored and re-evaluated in a 5-7 day time period. Please consult if re-evaluation needed sooner.

## 2023-07-27 NOTE — PLAN OF CARE
Problem: Adult Inpatient Plan of Care  Goal: Plan of Care Review  Outcome: Ongoing, Progressing  Goal: Patient-Specific Goal (Individualized)  Outcome: Ongoing, Progressing  Goal: Absence of Hospital-Acquired Illness or Injury  Outcome: Ongoing, Progressing  Goal: Optimal Comfort and Wellbeing  Outcome: Ongoing, Progressing  Goal: Readiness for Transition of Care  Outcome: Ongoing, Progressing     Problem: Bariatric Environmental Safety  Goal: Safety Maintained with Care  Outcome: Ongoing, Progressing     Problem: Activity Intolerance  Goal: Enhanced Capacity and Energy  Outcome: Ongoing, Progressing     Problem: Skin Injury Risk Increased  Goal: Skin Health and Integrity  Outcome: Ongoing, Progressing     Problem: Infection  Goal: Absence of Infection Signs and Symptoms  Outcome: Ongoing, Progressing     Problem: Anemia  Goal: Anemia Symptom Improvement  Outcome: Ongoing, Progressing

## 2023-07-27 NOTE — PT/OT/SLP PROGRESS
Physical Therapy    Missed Treatment Session    Patient Name:  Kely Sparks   MRN:  [id number]      Patient not seen at this time secondary to pt getting cleaned up with daughter's assistance. Requests therapist return at later time.    Will follow-up as patient is available to participate and as therapists' schedule allows.

## 2023-07-27 NOTE — PLAN OF CARE
52 yo known with large uterus and uterine fibroids currently admitted for symptomatic anemia as well as treatment of pulmonary embolisms. GYN consulted     Interval Events:  - While in house diagnosed with Pes, DVT studies negative, continued on heparin  - She has received a total of 4 units of pRBC while in house  - Throughout hospitalization initially continued on aygestin  - However today s/p multi-disciplinary meeting to discuss r/b of fibroid treatment in the setting of Pes and bleeding risk     This afternoon patient feels much improved. She continues to really have no significant bleeding and only occasional spotting. Following discussion with interdisciplinary meeting, discussed with patient the following.    - Do not recommend with proceeding with [facility] for sampling or Hysterectomy at this time   - Will recommend holding aygestin at this time given theoretical clot risk and current Pes, given that she is not bleeding right now will try to reduce clot risk and assess bleeding pattern  - Will continue to recommend transfusion PRN  - Will monitor bleeding closely as aygestin is discontinued and continued on long term AC  - Also discussed with patient, need to perform pelvic exam with possible EMB pending visualization, will try to do prior to leaving the hospital    Patient understands and agrees with plan     Regina Castellano  [employer] [facility], PGY4

## 2023-07-27 NOTE — PROGRESS NOTES
[facility] Progress Note     Resident Team: [facility] List 3  Attending Physician: Destiny Alba MD  Resident: Lashanda Moran MD HO-III  Intern: Giovanny Dinh MD HO-I       Subjective:      Brief HPI:    Kely Sparks is a 52 y.o.  female with a history of abnormal uterine bleeding who presented to [facility] ED on 2023  with complaint of feeling short of breath.  Patient reports that she began feeling short of breath on 2023.  She reports that she is a history of symptomatic anemia secondary to abnormal uterine bleeding.  She reports her last menstrual cycle was on  with heavy flow and multiple clots.  She reports that her cycle completed at the beginning of July however she is been spotting since then.  She reports no abnormal uterine discharge, foul odors, rashes, hematuria, hematemesis, hematochezia, melena, bright red blood per rectum, bruises, hemoptysis.  She does report ongoing dyspnea on exertion since onset of symptoms, and reports bilateral lower extremity swelling.  Reports no chest pains, no palpitations, no fevers, no chills, no sick contacts, no trauma, no abdominal pain, no nausea, no vomiting, no diarrhea.  Has no other complaints at this time.  In the ED, found to have H/H of 5.3/19.8.  [facility] was consulted for admission.    Interval History:   : Given 3 units of blood  : CT-PE with  pulmonary emboli within right lower lobe segmental and subsegmental branches as well as suspected within left lower lobe subsegmental branches, started on heparin gtt  : Given 1 unit of blood. Increased to high intensity IV Heparin as patient was not clinically improving from respiratory standpoint. Attempting transfer to higher level of care facility.  : Patient markedly improved after switching to high intensity IV Heparin. Attempting transfer to higher level of care facility.  : Discontinued Aygestin today. Will continue tx for PE  with high intensity Heparin until decision about endometrial biopsy is made.    VSS. Afebrile. BP well controlled. Patient no longer experiencing sob at rest nor with ambulation. No longer endorses anxiety and palpitations. Endorses mild vaginal spotting but no significant bleeding. She otherwise denies any other complaints at this time.    Review of Systems:  Review of Systems   Constitutional:  Negative for chills, diaphoresis, fatigue and fever.   HENT:  Negative for ear pain, hearing loss, nosebleeds, rhinorrhea and sore throat.    Eyes:  Negative for pain, redness and visual disturbance.   Respiratory:  Negative for cough, chest tightness and shortness of breath.    Cardiovascular:  Negative for chest pain and leg swelling.   Gastrointestinal:  Negative for abdominal pain, constipation, diarrhea, nausea and vomiting.   Genitourinary:  Negative for dysuria, frequency, hematuria and urgency.   Musculoskeletal:  Negative for arthralgias and myalgias.   Skin:  Negative for pallor, rash and wound.   Neurological:  Negative for dizziness, weakness, light-headedness, numbness and headaches.   Psychiatric/Behavioral:  Negative for agitation and confusion.       Objective:     Last 24 Hour Vital Signs:  BP  Min: 117/67  Max: 157/96  Temp  Av.2 °F (36.8 °C)  Min: 97.9 °F (36.6 °C)  Max: 98.6 °F (37 °C)  Pulse  Av.1  Min: 70  Max: 84  Resp  Av.6  Min: 18  Max: 20  SpO2  Av.1 %  Min: 98 %  Max: 100 %  Weight  Av.5 kg (267 lb 14.4 oz)  Min: 121.5 kg (267 lb 14.4 oz)  Max: 121.5 kg (267 lb 14.4 oz)  I/O last 3 completed shifts:  In: 2091.5 [I.V.:857; Blood:357; IV Piggyback:877.6]  Out: -     Physical Examination:  Physical Exam  Vitals and nursing note reviewed.   Constitutional:       General: She is not in acute distress.     Appearance: Normal appearance. She is obese. She is not ill-appearing or diaphoretic.   HENT:      Head: Normocephalic and atraumatic.      Right Ear: Tympanic membrane, ear  canal and external ear normal.      Left Ear: Tympanic membrane, ear canal and external ear normal.      Nose: Nose normal.      Mouth/Throat:      Mouth: Mucous membranes are moist.      Pharynx: Oropharynx is clear.   Eyes:      Extraocular Movements: Extraocular movements intact.      Conjunctiva/sclera: Conjunctivae normal.      Pupils: Pupils are equal, round, and reactive to light.   Cardiovascular:      Rate and Rhythm: Normal rate and regular rhythm.      Pulses: Normal pulses.      Heart sounds: Normal heart sounds. No murmur heard.    No friction rub. No gallop.   Pulmonary:      Effort: Pulmonary effort is normal.      Breath sounds: Normal breath sounds. No stridor. No wheezing, rhonchi or rales.   Abdominal:      General: Abdomen is flat. Bowel sounds are normal.      Palpations: Abdomen is soft.   Musculoskeletal:         General: No swelling or deformity. Normal range of motion.      Cervical back: Normal range of motion and neck supple.   Skin:     General: Skin is warm and dry.      Capillary Refill: Capillary refill takes less than 2 seconds.      Findings: No rash.   Neurological:      Comments: AAOx4; CN II-XII intact   Psychiatric:         Mood and Affect: Mood normal.         Behavior: Behavior normal.     Laboratory:  Most Recent Data:  CBC:   Lab Results   Component Value Date    WBC 13.32 (H) 07/27/2023    HGB 9.0 (L) 07/27/2023    HCT 31.1 (L) 07/27/2023     07/27/2023    MCV 73.0 (L) 07/27/2023    RDW 24.9 (H) 07/27/2023     WBC Differential:   Recent Labs   Lab 07/24/23  1801 07/25/23  0209 07/25/23  1957 07/26/23  0711 07/27/23  0419   WBC 13.56* 13.24* 11.36 12.60* 13.32*   HGB 7.1* 7.2* 8.9* 8.3* 9.0*   HCT 24.3* 24.3* 30.0* 28.1* 31.1*   * 222 240 253 255   MCV 69.8* 70.2* 71.6* 71.7* 73.0*     BMP:   Lab Results   Component Value Date     07/27/2023    K 3.7 07/27/2023    CO2 21 (L) 07/27/2023    BUN 6.4 (L) 07/27/2023    CREATININE 0.94 07/27/2023    CALCIUM 8.6  07/27/2023    MG 1.90 07/24/2023    PHOS 2.7 07/24/2023     LFTs:   Lab Results   Component Value Date    ALBUMIN 3.3 (L) 07/24/2023    BILITOT 1.4 07/24/2023    AST 44 (H) 07/24/2023    ALKPHOS 47 07/24/2023    ALT 37 07/24/2023     Coags:   Lab Results   Component Value Date    INR 1.2 07/25/2023    PROTIME 14.8 (H) 07/25/2023    PTT 72.4 07/27/2023     FLP:   Lab Results   Component Value Date    CHOL 139 10/19/2022    HDL 40 10/19/2022    TRIG 29 (L) 10/19/2022     DM:   Lab Results   Component Value Date    HGBA1C 5.2 10/19/2022    CREATININE 0.94 07/27/2023     Thyroid:   Lab Results   Component Value Date    TSH 0.5711 05/26/2022     Anemia:   Lab Results   Component Value Date    IRON 9 (L) 05/29/2023    TIBC 332 05/29/2023    FERRITIN 5.29 01/20/2023    DLYDCJFO72 569 01/21/2023    FOLATE 19.1 01/21/2023     Cardiac:   Lab Results   Component Value Date    TROPONINI 0.012 07/23/2023    BNP 57.3 07/23/2023       Microbiology Data Reviewed: yes  Pertinent Findings:  Blood cx negative  Urine cx negative    Other Results:  EKG (my interpretation): sinus tachycardia on admission. No repeat EKG needed to date.    Radiology:  Imaging Results              CTA Chest Non-Coronary (PE Studies) (Final result)  Result time 07/24/23 12:53:58      Final result by Sheldon Guevara MD (07/24/23 12:53:58)                   Impression:      1. There do appear to be pulmonary emboli within right lower lobe segmental and subsegmental branches as well as suspected within left lower lobe subsegmental branches.  Note motion artifact does limit evaluation.  No CT evidence of right heart strain.  2. Epic message sent to and received by Dr. Lashanda Moran at 12:53 hours on 07/24/2023.      Electronically signed by: Sheldon Guevara MD  Date:    07/24/2023  Time:    12:53               Narrative:    EXAMINATION:  CTA CHEST NON CORONARY (PE STUDIES)    CLINICAL HISTORY:  Shortness of breath, unknown D-dimer.    TECHNIQUE:  Axial CTA images of  the chest were obtained with intravenous contrast utilizing PE protocol.  Coronal and sagittal MIP reformations were obtained. Automated exposure control was utilized. Total exam DLP is 904 mGy cm.    COMPARISON:  Chest radiograph 07/24/2023    FINDINGS:  There is motion artifact of breathing as well as cardiac motion.  There is streak artifact from contrast within the SVC.  There are filling defects demonstrated within the right lower lobe segmental and subsegmental pulmonary arterial branches.  There are suspected filling defects within left lower lobe subsegmental pulmonary arterial branches.  There is no saddle embolus.  There is no CT evidence of right heart strain.  There is small pericardial effusion identified.  Thoracic aorta is not aneurysmal.  No lymphadenopathy is identified within the chest.  No pleural effusion is identified.  There is no consolidation, pneumothorax, or lung mass identified.  Included portions of the upper abdomen demonstrate no acute abnormality.  No acute displaced fractures identified.                                       X-Ray Chest AP Portable (Final result)  Result time 07/23/23 15:30:31      Final result by Rodrigue Obrien MD (07/23/23 15:30:31)                   Impression:      No acute cardiopulmonary process.      Electronically signed by: Rodrigue Obrien  Date:    07/23/2023  Time:    15:30               Narrative:    EXAMINATION:  XR CHEST AP PORTABLE    CLINICAL HISTORY:  SOB;    TECHNIQUE:  Single view of the chest    COMPARISON:  03/03/2015    FINDINGS:  No focal opacification, pleural effusion, or pneumothorax.    The cardiac silhouette remains prominent.    No acute osseous abnormality.                                      Current Medications:     Infusions:   heparin (porcine) in D5W 22 Units/kg/hr (07/27/23 0700)        Scheduled:   ferrous sulfate  1 tablet Oral BID    lactated ringers  1,000 mL Intravenous Once    mupirocin   Nasal BID    pantoprazole  40 mg  Oral Daily    sodium chloride 0.9%  10 mL Intravenous Q6H        PRN:  sodium chloride, sodium chloride, sodium chloride 0.9%, sodium chloride 0.9%, acetaminophen, heparin (PORCINE), heparin (PORCINE), labetalol, melatonin, ondansetron, sodium chloride 0.9%, Flushing PICC Protocol **AND** sodium chloride 0.9% **AND** sodium chloride 0.9%    Antibiotics and Day Number of Therapy:  Vanco/Zosyn - Day 5    Lines and Day Number of Therapy:  PICC    Assessment & Plan:     Bilateral Pulmonary Emboli  -CT-PE with pulmonary emboli within right lower lobe segmental and subsegmental branches as well as suspected within left lower lobe subsegmental branches.  -Patient continues to improve, no longer experiencing fever or tachypnea, saturations > 92% on room air  -Continue high intensity IV Heparin for tx of PEs     Symptomatic microcytic anemia  Abnormal uterine bleeding  HgB 9.0  -S/p transfusion of 2 units of pRBC yesterday  -S/p IV iron yesterday  -Gyn consulted, appreciate their assistance  -Discontinued Aygestin today  -Currently hemodynamically stable  -Transfuse for Hemoglobin <7  - hx of MISTI, will continue supplementation with PO iron every other day  -Will continue to trend H&H  -Planning for possible EMB, awaiting patient decision     SIRS  1 out 4  WBC 13.32  -Blood cx NTD  -Urine cx NTD  -Will continue broad-spectrum coverage vanco/Zosyn (day 5) for now     Hyperchloremic non-anion gap metabolic acidosis  -S/p transfusion of 4 units thus far  -Will continue to monitor with daily BMP    Hypokalemia (Resolved)    CODE STATUS: Full Code   Access: PICC  Antibiotics: Vanco/Zosyn (day 5)   Analgesia: acetaminophen  Diet: Diet Adult Regular  DVT Prophylaxis: Heparin  GI Prophylaxis: proton pump inhibitor per orders  O2: Room air  Fluids: none    Consults: OB/GYN     Disposition: Patient to remain inpatient for continued IV abx and IV Heparin. OB to attempt to perform EMB. Awaiting patient decision.    The patient was seen  with and plan was discussed with Dr. Alba, who agrees.    Giovanny Dinh MD  HO-1, Internal Medicine  7/27/2023  11:02 AM

## 2023-07-27 NOTE — NURSING
The patient's PTT was therapeutic and there was no need to change the dose at this time. She will be redrawn in the morning.

## 2023-07-27 NOTE — NURSING
Spoke with primary nurse and reviewed with Dr Chase.  Blood is to be prepared and held. Will discuss with pt and family

## 2023-07-27 NOTE — PLAN OF CARE
Problem: Adult Inpatient Plan of Care  Goal: Plan of Care Review  Outcome: Ongoing, Progressing  Goal: Patient-Specific Goal (Individualized)  Outcome: Ongoing, Progressing  Goal: Absence of Hospital-Acquired Illness or Injury  Outcome: Ongoing, Progressing  Goal: Optimal Comfort and Wellbeing  Outcome: Ongoing, Progressing  Goal: Readiness for Transition of Care  Outcome: Ongoing, Progressing     Problem: Bariatric Environmental Safety  Goal: Safety Maintained with Care  Outcome: Ongoing, Progressing     Problem: Activity Intolerance  Goal: Enhanced Capacity and Energy  Outcome: Ongoing, Progressing     Problem: Skin Injury Risk Increased  Goal: Skin Health and Integrity  Outcome: Ongoing, Progressing     Problem: Infection  Goal: Absence of Infection Signs and Symptoms  Outcome: Ongoing, Progressing     Problem: Anemia  Goal: Anemia Symptom Improvement  Outcome: Ongoing, Progressing      no

## 2023-07-28 LAB
ANION GAP SERPL CALC-SCNC: 9 MEQ/L
APPEARANCE UR: CLEAR
APTT PPP: 62.6 SECONDS
BACTERIA #/AREA URNS AUTO: ABNORMAL /HPF
BASOPHILS # BLD AUTO: 0.1 X10(3)/MCL
BASOPHILS NFR BLD AUTO: 0.6 %
BILIRUB UR QL STRIP.AUTO: NEGATIVE
BUN SERPL-MCNC: 7.1 MG/DL (ref 9.8–20.1)
CALCIUM SERPL-MCNC: 8.9 MG/DL (ref 8.4–10.2)
CHLORIDE SERPL-SCNC: 108 MMOL/L (ref 98–107)
CO2 SERPL-SCNC: 23 MMOL/L (ref 22–29)
COLOR UR: COLORLESS
CREAT SERPL-MCNC: 1.05 MG/DL (ref 0.55–1.02)
CREAT/UREA NIT SERPL: 7
EOSINOPHIL # BLD AUTO: 0.47 X10(3)/MCL (ref 0–0.9)
EOSINOPHIL NFR BLD AUTO: 2.7 %
ERYTHROCYTE [DISTWIDTH] IN BLOOD BY AUTOMATED COUNT: 25.6 % (ref 11.5–17)
GFR SERPLBLD CREATININE-BSD FMLA CKD-EPI: >60 MLS/MIN/1.73/M2
GLUCOSE SERPL-MCNC: 94 MG/DL (ref 74–100)
GLUCOSE UR QL STRIP.AUTO: NORMAL
HCT VFR BLD AUTO: 33.2 % (ref 37–47)
HGB BLD-MCNC: 9.9 G/DL (ref 12–16)
HYALINE CASTS #/AREA URNS LPF: ABNORMAL /LPF
IMM GRANULOCYTES # BLD AUTO: 0.32 X10(3)/MCL (ref 0–0.04)
IMM GRANULOCYTES NFR BLD AUTO: 1.9 %
KETONES UR QL STRIP.AUTO: NEGATIVE
LEUKOCYTE ESTERASE UR QL STRIP.AUTO: 25
LYMPHOCYTES # BLD AUTO: 2.31 X10(3)/MCL (ref 0.6–4.6)
LYMPHOCYTES NFR BLD AUTO: 13.5 %
MCH RBC QN AUTO: 22 PG (ref 27–31)
MCHC RBC AUTO-ENTMCNC: 29.8 G/DL (ref 33–36)
MCV RBC AUTO: 73.9 FL (ref 80–94)
MONOCYTES # BLD AUTO: 1.67 X10(3)/MCL (ref 0.1–1.3)
MONOCYTES NFR BLD AUTO: 9.8 %
MUCOUS THREADS URNS QL MICRO: ABNORMAL /LPF
NEUTROPHILS # BLD AUTO: 12.25 X10(3)/MCL (ref 2.1–9.2)
NEUTROPHILS NFR BLD AUTO: 71.5 %
NITRITE UR QL STRIP.AUTO: NEGATIVE
NRBC BLD AUTO-RTO: 0 %
PH UR STRIP.AUTO: 6 [PH]
PLATELET # BLD AUTO: 346 X10(3)/MCL (ref 130–400)
PLATELETS.RETICULATED NFR BLD AUTO: 5.8 % (ref 0.9–11.2)
PMV BLD AUTO: ABNORMAL FL
POTASSIUM SERPL-SCNC: 4 MMOL/L (ref 3.5–5.1)
PROT UR QL STRIP.AUTO: NEGATIVE
RBC # BLD AUTO: 4.49 X10(6)/MCL (ref 4.2–5.4)
RBC #/AREA URNS AUTO: ABNORMAL /HPF
RBC UR QL AUTO: ABNORMAL
SODIUM SERPL-SCNC: 140 MMOL/L (ref 136–145)
SP GR UR STRIP.AUTO: 1.01
SQUAMOUS #/AREA URNS LPF: ABNORMAL /HPF
UROBILINOGEN UR STRIP-ACNC: NORMAL
WBC # SPEC AUTO: 17.12 X10(3)/MCL (ref 4.5–11.5)
WBC #/AREA URNS AUTO: ABNORMAL /HPF

## 2023-07-28 PROCEDURE — 27000221 HC OXYGEN, UP TO 24 HOURS

## 2023-07-28 PROCEDURE — 85025 COMPLETE CBC W/AUTO DIFF WBC: CPT

## 2023-07-28 PROCEDURE — 80048 BASIC METABOLIC PNL TOTAL CA: CPT

## 2023-07-28 PROCEDURE — 81001 URINALYSIS AUTO W/SCOPE: CPT | Performed by: STUDENT IN AN ORGANIZED HEALTH CARE EDUCATION/TRAINING PROGRAM

## 2023-07-28 PROCEDURE — 99900035 HC TECH TIME PER 15 MIN (STAT)

## 2023-07-28 PROCEDURE — 87040 BLOOD CULTURE FOR BACTERIA: CPT

## 2023-07-28 PROCEDURE — 97116 GAIT TRAINING THERAPY: CPT

## 2023-07-28 PROCEDURE — 25000003 PHARM REV CODE 250

## 2023-07-28 PROCEDURE — A4216 STERILE WATER/SALINE, 10 ML: HCPCS | Performed by: INTERNAL MEDICINE

## 2023-07-28 PROCEDURE — 85730 THROMBOPLASTIN TIME PARTIAL: CPT

## 2023-07-28 PROCEDURE — 94761 N-INVAS EAR/PLS OXIMETRY MLT: CPT

## 2023-07-28 PROCEDURE — 63600175 PHARM REV CODE 636 W HCPCS

## 2023-07-28 PROCEDURE — 25000003 PHARM REV CODE 250: Performed by: STUDENT IN AN ORGANIZED HEALTH CARE EDUCATION/TRAINING PROGRAM

## 2023-07-28 PROCEDURE — 25000003 PHARM REV CODE 250: Performed by: INTERNAL MEDICINE

## 2023-07-28 PROCEDURE — 63600175 PHARM REV CODE 636 W HCPCS: Performed by: STUDENT IN AN ORGANIZED HEALTH CARE EDUCATION/TRAINING PROGRAM

## 2023-07-28 PROCEDURE — 21400001 HC TELEMETRY ROOM

## 2023-07-28 RX ADMIN — PIPERACILLIN AND TAZOBACTAM 4.5 G: 4; .5 INJECTION, POWDER, LYOPHILIZED, FOR SOLUTION INTRAVENOUS; PARENTERAL at 05:07

## 2023-07-28 RX ADMIN — MUPIROCIN: 20 OINTMENT TOPICAL at 09:07

## 2023-07-28 RX ADMIN — SODIUM CHLORIDE, PRESERVATIVE FREE 10 ML: 5 INJECTION INTRAVENOUS at 12:07

## 2023-07-28 RX ADMIN — SODIUM CHLORIDE, PRESERVATIVE FREE 10 ML: 5 INJECTION INTRAVENOUS at 05:07

## 2023-07-28 RX ADMIN — APIXABAN 10 MG: 2.5 TABLET, FILM COATED ORAL at 03:07

## 2023-07-28 RX ADMIN — HEPARIN SODIUM 24 UNITS/KG/HR: 10000 INJECTION, SOLUTION INTRAVENOUS at 08:07

## 2023-07-28 RX ADMIN — PANTOPRAZOLE SODIUM 40 MG: 40 TABLET, DELAYED RELEASE ORAL at 09:07

## 2023-07-28 RX ADMIN — SODIUM CHLORIDE, PRESERVATIVE FREE 10 ML: 5 INJECTION INTRAVENOUS at 11:07

## 2023-07-28 RX ADMIN — PIPERACILLIN AND TAZOBACTAM 4.5 G: 4; .5 INJECTION, POWDER, LYOPHILIZED, FOR SOLUTION INTRAVENOUS; PARENTERAL at 10:07

## 2023-07-28 RX ADMIN — HEPARIN SODIUM 24 UNITS/KG/HR: 10000 INJECTION, SOLUTION INTRAVENOUS at 06:07

## 2023-07-28 NOTE — PROGRESS NOTES
GYNECOLOGY INPATIENT PROGRESS NOTE    Kely Sparks is a 53 y.o. female   who presented to [facility] ED due to shortness of breath and symptomatic anemia. Now found to have bilateral PEs Gyn consulted for management of symptomatic anemia presumed to be 2/2 known AUB-L. Pt not currently bleeding .She was admitted to  HD.     Pt states that she's feeling well this morning and has not been on oxygen since 4 am. She denies vaginal bleeding or abdominal pain.     Review of Systems  Denies fevers, chills, headaches nausea, vomiting, dizziness, chest pain.    Medications      [START ON 2023] ferrous sulfate  1 tablet Oral Every Other Day    misoprostol  50 mcg Oral Once    mupirocin   Nasal BID    pantoprazole  40 mg Oral Daily    sodium chloride 0.9%  10 mL Intravenous Q6H         heparin (porcine) in D5W 24 Units/kg/hr (23 0654)      sodium chloride, sodium chloride, sodium chloride 0.9%, sodium chloride 0.9%, acetaminophen, heparin (PORCINE), heparin (PORCINE), labetalol, melatonin, ondansetron, sodium chloride 0.9%, Flushing PICC Protocol **AND** sodium chloride 0.9% **AND** sodium chloride 0.9%    Objective    Vitals:    23 0003 23 0029 23 0433 23 0438   BP:  (!) 141/77 (!) 150/73    Pulse:  77 83    Resp:       Temp:  98.5 °F (36.9 °C) 98.8 °F (37.1 °C)    TempSrc:  Oral Oral    SpO2: 100% 100% 97%    Weight:    119.3 kg (263 lb 0.1 oz)   Height:           Physical Exam    General: alert and oriented, resting in bed however uncomfortable appearing   Lungs: Breathing normally without conversational dyspnea on RA, rales appreciated in bilateral lower lobes,   Heart:: Regular rate and rhythm   Abdomen Soft,non-tender ,  bowel sounds   Pelvic: Deferred    Extremities: No con-tender, trace non-pitting edema bilaterally with taut skin     Labs  Trended Lab Data:  Recent Labs   Lab 23  1457 23  1946 23  0124 23  0222 23  0711 23  1317  07/27/23  0419 07/28/23  0419   WBC 14.86*  --   --    < > 12.60*  --  13.32* 17.12*   HGB 5.3*   < >  --    < > 8.3*  --  9.0* 9.9*   HCT 19.8*   < >  --    < > 28.1*  --  31.1* 33.2*     --   --    < > 253  --  255 346   MCV 66.7*  --   --    < > 71.7*  --  73.0* 73.9*   RDW 19.0*  --   --    < > 23.7*  --  24.9* 25.6*     --  138  --   --  142 142  --    K 3.0*  --  4.1  --   --  3.6 3.7  --    CO2 20*  --  17*  --   --  24 21*  --    BUN 9.2*  --  6.4*  --   --  6.0* 6.4*  --    CALCIUM 8.7  --  8.9  --   --  8.5 8.6  --    ALBUMIN 3.2*  --  3.3*  --   --   --   --   --    BILITOT 0.7  --  1.4  --   --   --   --   --    AST 38*  --  44*  --   --   --   --   --    ALKPHOS 52  --  47  --   --   --   --   --    ALT 28  --  37  --   --   --   --   --     < > = values in this interval not displayed.       Imaging :   US Pelvis Comp with Transvag NON-OB (xpd     Result Date: 5/30/2023  EXAMINATION: US PELVIS COMP WITH TRANSVAG NON-OB (XPD) CLINICAL HISTORY: worsening vaginal bleeding, anemic, fibroids 6 months ago; TECHNIQUE: Transabdominal and endovaginal ultrasound of the pelvis. COMPARISON: Ultrasound 05/26/2022 FINDINGS: Poor overall uterine visualization due to decreased sonographic penetration.  Uterus is enlarged with at least 2 suspected fibroids on the transabdominal images.  These measure up to 4.2 and 6.4 cm.  Obscured endometrial stripe.  Ovaries not visualized.  No large volume pelvic ascites. Measurements: Uterus: 11.7 x 9.4 x 8.2 cm Right ovary: Obscured Left ovary: Obscured      1. Limited assessment.  Enlarged uterus with at least 2 suspected fibroids. 2. Ovaries not visualized    Results for orders placed or performed during the hospital encounter of 07/23/23 (from the past 2160 hour(s))   CT Abdomen Pelvis  Without Contrast    Narrative    EXAMINATION:  CT ABDOMEN PELVIS WITHOUT CONTRAST    CLINICAL HISTORY:  Fever of unknown origin (Ped > 1mo); Fever,  unspecified    TECHNIQUE:  Helically acquired axial images, sagittal and coronal reformations were obtained from the lung bases to the pubic symphysis without the IV administration of contrast.    Automated tube current modulation, weight-based exposure dosing, and/or iterative reconstruction technique utilized to reach lowest reasonably achievable exposure rate.    DLP: 698 mGy*cm    COMPARISON:  CTA chest 07/24/2023, pelvic sonogram 05/30/2023    FINDINGS:  HEART: Cardiomegaly.    LUNG BASES: Trace pleural fluid bilaterally.  Basilar atelectasis.    LIVER: Normal attenuation. No appreciable focal hepatic lesion.    BILIARY: Gallbladder is surgically absent.    PANCREAS: No inflammatory change.    SPLEEN: 12 cm.  Upper limits of normal in size.    ADRENALS: No mass.    KIDNEYS/URETERS: Patchy retention of contrast at the renal parenchyma, left greater than right.  This is related to contrast enhanced CTA of the chest 07/24/2023.  Extrarenal pelvis on the left.  Mild pelvocaliectasis on the left.    GI TRACT/MESENTERY: Evaluation of the bowel is limited without contrast.  No evidence of bowel obstruction or inflammation.    PERITONEUM: No free fluid.No free air.    LYMPH NODES: Mildly prominent inguinal lymph nodes, most commonly reactive.    VASCULATURE: No significant atherosclerosis or aneurysm.    BLADDER: Normal appearance given degree of distention.    REPRODUCTIVE ORGANS: Globular enlargement of the uterus with large, 10 cm pedunculated lesion at the uterine fundus/left adnexa.    ABDOMINAL WALL: Unremarkable.    BONES: Degenerative change at the lumbar spine.      Impression    1. Patchy retention of contrast at the kidneys may be related to acute renal injury, pyelonephritis or less likely obstructive change.  Mild left pelvicaliectasis likely related to compression of the left ureter at the pelvic brim by the enlarged uterus.  2. Globular enlargement of the uterus with large pedunculated lesion at the  fundus/left adnexa suggestive of fibroids.  This is poorly characterized without contrast.  3.  The preliminary and final reports are concordant.      Electronically signed by: Luci Andre  Date:    07/25/2023  Time:    12:04   CTA Chest Non-Coronary (PE Studies)    Narrative    EXAMINATION:  CTA CHEST NON CORONARY (PE STUDIES)    CLINICAL HISTORY:  Shortness of breath, unknown D-dimer.    TECHNIQUE:  Axial CTA images of the chest were obtained with intravenous contrast utilizing PE protocol.  Coronal and sagittal MIP reformations were obtained. Automated exposure control was utilized. Total exam DLP is 904 mGy cm.    COMPARISON:  Chest radiograph 07/24/2023    FINDINGS:  There is motion artifact of breathing as well as cardiac motion.  There is streak artifact from contrast within the SVC.  There are filling defects demonstrated within the right lower lobe segmental and subsegmental pulmonary arterial branches.  There are suspected filling defects within left lower lobe subsegmental pulmonary arterial branches.  There is no saddle embolus.  There is no CT evidence of right heart strain.  There is small pericardial effusion identified.  Thoracic aorta is not aneurysmal.  No lymphadenopathy is identified within the chest.  No pleural effusion is identified.  There is no consolidation, pneumothorax, or lung mass identified.  Included portions of the upper abdomen demonstrate no acute abnormality.  No acute displaced fractures identified.      Impression    1. There do appear to be pulmonary emboli within right lower lobe segmental and subsegmental branches as well as suspected within left lower lobe subsegmental branches.  Note motion artifact does limit evaluation.  No CT evidence of right heart strain.  2. Epic message sent to and received by Dr. Lashanda Moran at 12:53 hours on 07/24/2023.      Electronically signed by: Sheldon Guevara MD  Date:    07/24/2023  Time:    12:53         Assessment/Plan  53 y.o. #  admitted to management for pulmonary embolism on heparin Pt currently stable            Patient understands and agrees with plan      #AUB-L   Patient previously w/u for AUB-L and hysterectomy for definitive surgical management with our clinic  Pt currently not bleeding  EMB 6/2022 demonstrating benign strips of endometrial and endocervical tissue, Pap 6/2022 NILM/HPV-  DVT studies negative, continued on heparin  She has received a total of 4 units of pRBC while in house   s/p multi-disciplinary meeting to discuss Aygestin usage. Decided to discontinue Aygestin treatment in the setting of Pes   Do not recommend with proceeding with [facility] for sampling or Hysterectomy at this time   Recommend pelvic exam with EMB ,however pt is hesitant to under go EMB due to unfavorable past experiences. Discussed that there is concern for cancer precipitating the PE's and that endometrial sampling is necessary to rule out endometrial cancer.  pt states that she understands but wants to undergo general anesthesia for sampling . However, pt is not a surgical candidate at this time . Will defer EMB per pts request. Will bring pt up to clinic for pelvic exam today      #Anemia   -  H/H on admission 5.3/19.8---> 7.3/25.1 --> 7.2/24.3--> 8.3/28.1-->9.0/31-->9.9/33.2  -  Overall, pt has received 4 upRBCs  - Continue to monitor     Medicine to continue management of PE and anemia which should take priority over gyn concerns, will continue to follow along to manage bleeding    Discussed with staff, Dr. Provost Urmila Lentz MD   [facility], PGY2      I have reviewed the patient's medical history, physical exam, and diagnosis with treatment plan. The care provided was reasonable and necessary.    Susie Thibodeaux MD  [facility]

## 2023-07-28 NOTE — PT/OT/SLP PROGRESS
Physical Therapy Treatment    Patient Name:  Kely Sparks   MRN:  [id number]    Recommendations     Discharge Recommendations:  home   Discharge Equipment Recommendations: none   Barriers to discharge: none    Assessment     Kely Sparks is a 53 y.o. female admitted with a medical diagnosis of Iron deficiency anemia due to chronic blood loss. Pt is demonstrating improved stability with ambulation and decreased dyspnea upon exertion, however functional independence remains limiting compared to baseline.    She presents with the following impairments/functional limitations:  impaired functional mobility, gait instability, impaired cardiopulmonary response to activity.    Rehab Prognosis: Good.    Patient would benefit from continued skilled acute PT services to: address above listed impairments/functional limitations; receive patient/caregiver education; reduce fall risk; and maximize independency/safety with functional mobility.    Recent Surgery: * No surgery found *      Plan     During this hospitalization, patient to be seen 3 x/week to address the identified impairments/functional limitations via gait training, therapeutic activities, therapeutic exercises and progress toward the established goals.    Plan of Care Expires:  08/26/23    Subjective     Communicated with patient's nurse Daniel prior to session.    Patient agreeable to participate in treatment session.    Chief Complaint: none  Patient/Family Comments/goals: return home  Pain/Comfort:  Pain Rating 1: 0/10  Pain Rating Post-Intervention 1: 0/10    Objective     Patient found with HOB elevated with central line, oxygen, telemetry  upon PT entry to room.    General Precautions: Standard, fall   Orthopedic Precautions:N/A   Braces: N/A  Respiratory Status: room air    Functional Mobility:    Bed Mobility:  Rolling Left: independence  Supine to Sit: independence    Transfers:  Sit to Stand: independence with no assistive  device    Gait:  Patient ambulated 260ft with  IV pole  and supervision.  Patient demonstrates symmetrical step length and decreased reta.  Patient ambulated 130 without AD and supervision.  Patient with noted increase in ROSSY and mild increase in lateral trunk sway when ambulating without AD. NO LOB or increase in SOB.  Other Mobility:  not assessed    Patient left  in the bathroom  with all lines intact and call button in reach.    Goals     Multidisciplinary Problems       Physical Therapy Goals          Problem: Physical Therapy    Goal Priority Disciplines Outcome Goal Variances Interventions   Physical Therapy Goal     PT, PT/OT Ongoing, Progressing     Description: Goals to be met by: 2023     Patient will increase functional independence with mobility by performin. Gait  x 300 feet with [place]on using No Assistive Device.   2. Ascend/descend 4 stair with left Handrails Harmon using No Assistive Device.                        Time Tracking     PT Received On: 23  PT Start Time: 1015     PT Stop Time: 1028  PT Total Time (min): 13 min     Billable Minutes: Gait Training 1 unit    2023

## 2023-07-28 NOTE — PROGRESS NOTES
[facility] Medicine Wards Progress Note     Resident Team: [facility] Medicine List 3  Attending Physician: Destiny Alba MD  Resident: Lashanda Moran MD HO-III  Intern: Giovanny Dinh MD HO-I       Subjective:      Brief HPI:    Kely Sparks is a 52 y.o.  female with a history of abnormal uterine bleeding who presented to [facility] ED on 2023  with complaint of feeling short of breath.  Patient reports that she began feeling short of breath on 2023.  She reports that she is a history of symptomatic anemia secondary to abnormal uterine bleeding.  She reports her last menstrual cycle was on  with heavy flow and multiple clots.  She reports that her cycle completed at the beginning of July however she is been spotting since then.  She reports no abnormal uterine discharge, foul odors, rashes, hematuria, hematemesis, hematochezia, melena, bright red blood per rectum, bruises, hemoptysis.  She does report ongoing dyspnea on exertion since onset of symptoms, and reports bilateral lower extremity swelling.  Reports no chest pains, no palpitations, no fevers, no chills, no sick contacts, no trauma, no abdominal pain, no nausea, no vomiting, no diarrhea.  Has no other complaints at this time.  In the ED, found to have H/H of 5.3/19.8.  [facility] was consulted for admission.    Interval History:   : Given 3 units of blood  : CT-PE with  pulmonary emboli within right lower lobe segmental and subsegmental branches as well as suspected within left lower lobe subsegmental branches, started on heparin gtt  : Given 1 unit of blood. Increased to high intensity IV Heparin as patient was not clinically improving from respiratory standpoint. Attempting transfer to higher level of care facility.  : Patient markedly improved after switching to high intensity IV Heparin. Attempting transfer to higher level of care facility.  : Discontinued Aygestin today. Will continue tx for PE  with high intensity Heparin until decision about endometrial biopsy is made.  : Patient denied request to perform EMB with Ob/Gyn. Agreed to pelvic exam. WBC uptrended from 13.32 to 17.12 after stopping abx yesterday. Obtained blood cx. Restarted abx.     VSS. Afebrile. BP well controlled. Patient no longer experiencing sob at rest nor with ambulation. Endorses insomnia last night as patient was unable to sleep more than an hour or two at time before waking up spontaneously. Endorses mild vaginal spotting but no significant bleeding. She otherwise denies any other complaints at this time.    Review of Systems:  Review of Systems   Constitutional:  Negative for chills, diaphoresis, fatigue and fever.   HENT:  Negative for ear pain, hearing loss, nosebleeds, rhinorrhea and sore throat.    Eyes:  Negative for pain, redness and visual disturbance.   Respiratory:  Negative for cough, chest tightness and shortness of breath.    Cardiovascular:  Negative for chest pain and leg swelling.   Gastrointestinal:  Negative for abdominal pain, constipation, diarrhea, nausea and vomiting.   Genitourinary:  Negative for dysuria, frequency, hematuria and urgency.   Musculoskeletal:  Negative for arthralgias and myalgias.   Skin:  Negative for pallor, rash and wound.   Neurological:  Negative for dizziness, weakness, light-headedness, numbness and headaches.   Psychiatric/Behavioral:  Negative for agitation and confusion.       Objective:     Last 24 Hour Vital Signs:  BP  Min: 122/71  Max: 150/73  Temp  Av.8 °F (37.1 °C)  Min: 98.3 °F (36.8 °C)  Max: 99.4 °F (37.4 °C)  Pulse  Av.2  Min: 77  Max: 83  SpO2  Av %  Min: 97 %  Max: 100 %  Weight  Av.3 kg (263 lb 0.1 oz)  Min: 119.3 kg (263 lb 0.1 oz)  Max: 119.3 kg (263 lb 0.1 oz)  I/O last 3 completed shifts:  In: 3485.6 [P.O.:2240; I.V.:559.9; IV Piggyback:685.7]  Out: -     Physical Examination:  Physical Exam  Vitals and nursing note reviewed.    Constitutional:       General: She is not in acute distress.     Appearance: Normal appearance. She is obese. She is not ill-appearing or diaphoretic.   HENT:      Head: Normocephalic and atraumatic.      Right Ear: Tympanic membrane, ear canal and external ear normal.      Left Ear: Tympanic membrane, ear canal and external ear normal.      Nose: Nose normal.      Mouth/Throat:      Mouth: Mucous membranes are moist.      Pharynx: Oropharynx is clear.   Eyes:      Extraocular Movements: Extraocular movements intact.      Conjunctiva/sclera: Conjunctivae normal.      Pupils: Pupils are equal, round, and reactive to light.   Cardiovascular:      Rate and Rhythm: Normal rate and regular rhythm.      Pulses: Normal pulses.      Heart sounds: Normal heart sounds. No murmur heard.    No friction rub. No gallop.   Pulmonary:      Effort: Pulmonary effort is normal.      Breath sounds: Normal breath sounds. No stridor. No wheezing, rhonchi or rales.   Abdominal:      General: Abdomen is flat. Bowel sounds are normal.      Palpations: Abdomen is soft.   Musculoskeletal:         General: No swelling or deformity. Normal range of motion.      Cervical back: Normal range of motion and neck supple.   Skin:     General: Skin is warm and dry.      Capillary Refill: Capillary refill takes less than 2 seconds.      Findings: No rash.   Neurological:      Comments: AAOx4; CN II-XII intact   Psychiatric:         Mood and Affect: Mood normal.         Behavior: Behavior normal.     Laboratory:  Most Recent Data:  CBC:   Lab Results   Component Value Date    WBC 17.12 (H) 07/28/2023    HGB 9.9 (L) 07/28/2023    HCT 33.2 (L) 07/28/2023     07/28/2023    MCV 73.9 (L) 07/28/2023    RDW 25.6 (H) 07/28/2023     WBC Differential:   Recent Labs   Lab 07/25/23  0209 07/25/23 1957 07/26/23  0711 07/27/23  0419 07/28/23  0419   WBC 13.24* 11.36 12.60* 13.32* 17.12*   HGB 7.2* 8.9* 8.3* 9.0* 9.9*   HCT 24.3* 30.0* 28.1* 31.1* 33.2*   PLT  222 240 253 255 346   MCV 70.2* 71.6* 71.7* 73.0* 73.9*       BMP:   Lab Results   Component Value Date     07/28/2023    K 4.0 07/28/2023    CO2 23 07/28/2023    BUN 7.1 (L) 07/28/2023    CREATININE 1.05 (H) 07/28/2023    CALCIUM 8.9 07/28/2023    MG 1.90 07/24/2023    PHOS 2.7 07/24/2023     LFTs:   Lab Results   Component Value Date    ALBUMIN 3.3 (L) 07/24/2023    BILITOT 1.4 07/24/2023    AST 44 (H) 07/24/2023    ALKPHOS 47 07/24/2023    ALT 37 07/24/2023     Coags:   Lab Results   Component Value Date    INR 1.2 07/25/2023    PROTIME 14.8 (H) 07/25/2023    PTT 62.6 07/28/2023     FLP:   Lab Results   Component Value Date    CHOL 139 10/19/2022    HDL 40 10/19/2022    TRIG 29 (L) 10/19/2022     DM:   Lab Results   Component Value Date    HGBA1C 5.2 10/19/2022    CREATININE 1.05 (H) 07/28/2023     Thyroid:   Lab Results   Component Value Date    TSH 0.5711 05/26/2022     Anemia:   Lab Results   Component Value Date    IRON 9 (L) 05/29/2023    TIBC 332 05/29/2023    FERRITIN 5.29 01/20/2023    UZAWKNYH17 569 01/21/2023    FOLATE 19.1 01/21/2023     Cardiac:   Lab Results   Component Value Date    TROPONINI 0.012 07/23/2023    BNP 57.3 07/23/2023       Microbiology Data Reviewed: yes  Pertinent Findings:  Blood cx pending  Urine cx negative    Other Results:  EKG (my interpretation): sinus tachycardia on admission. No repeat EKG needed to date.    Radiology:  Imaging Results              CTA Chest Non-Coronary (PE Studies) (Final result)  Result time 07/24/23 12:53:58      Final result by Sheldon Guevara MD (07/24/23 12:53:58)                   Impression:      1. There do appear to be pulmonary emboli within right lower lobe segmental and subsegmental branches as well as suspected within left lower lobe subsegmental branches.  Note motion artifact does limit evaluation.  No CT evidence of right heart strain.  2. Epic message sent to and received by Dr. Lashanda Moran at 12:53 hours on  07/24/2023.      Electronically signed by: Sheldon Guevara MD  Date:    07/24/2023  Time:    12:53               Narrative:    EXAMINATION:  CTA CHEST NON CORONARY (PE STUDIES)    CLINICAL HISTORY:  Shortness of breath, unknown D-dimer.    TECHNIQUE:  Axial CTA images of the chest were obtained with intravenous contrast utilizing PE protocol.  Coronal and sagittal MIP reformations were obtained. Automated exposure control was utilized. Total exam DLP is 904 mGy cm.    COMPARISON:  Chest radiograph 07/24/2023    FINDINGS:  There is motion artifact of breathing as well as cardiac motion.  There is streak artifact from contrast within the SVC.  There are filling defects demonstrated within the right lower lobe segmental and subsegmental pulmonary arterial branches.  There are suspected filling defects within left lower lobe subsegmental pulmonary arterial branches.  There is no saddle embolus.  There is no CT evidence of right heart strain.  There is small pericardial effusion identified.  Thoracic aorta is not aneurysmal.  No lymphadenopathy is identified within the chest.  No pleural effusion is identified.  There is no consolidation, pneumothorax, or lung mass identified.  Included portions of the upper abdomen demonstrate no acute abnormality.  No acute displaced fractures identified.                                       X-Ray Chest AP Portable (Final result)  Result time 07/23/23 15:30:31      Final result by Rodrigue Obrien MD (07/23/23 15:30:31)                   Impression:      No acute cardiopulmonary process.      Electronically signed by: Rodrigue Obrien  Date:    07/23/2023  Time:    15:30               Narrative:    EXAMINATION:  XR CHEST AP PORTABLE    CLINICAL HISTORY:  SOB;    TECHNIQUE:  Single view of the chest    COMPARISON:  03/03/2015    FINDINGS:  No focal opacification, pleural effusion, or pneumothorax.    The cardiac silhouette remains prominent.    No acute osseous abnormality.                                       Current Medications:     Infusions:   heparin (porcine) in D5W 24 Units/kg/hr (07/28/23 0829)        Scheduled:   [START ON 7/29/2023] ferrous sulfate  1 tablet Oral Every Other Day    mupirocin   Nasal BID    pantoprazole  40 mg Oral Daily    piperacillin-tazobactam (Zosyn) IV (PEDS and ADULTS) (extended infusion is not appropriate)  4.5 g Intravenous Q8H    sodium chloride 0.9%  10 mL Intravenous Q6H        PRN:  sodium chloride, sodium chloride, sodium chloride 0.9%, sodium chloride 0.9%, acetaminophen, heparin (PORCINE), heparin (PORCINE), labetalol, melatonin, ondansetron, sodium chloride 0.9%, Flushing PICC Protocol **AND** sodium chloride 0.9% **AND** sodium chloride 0.9%    Antibiotics and Day Number of Therapy:  Vanc discontinued (07/17/23)  Restarted IV Zosyn - Completed 3 days initially, now starting day 4    Lines and Day Number of Therapy:  PICC    Assessment & Plan:     Bilateral Pulmonary Emboli  -CT-PE with pulmonary emboli within right lower lobe segmental and subsegmental branches as well as suspected within left lower lobe subsegmental branches  -Patient continues to improve regarding breathing without oxygen and ambulation, was intermittently using oxygen now completely on room air, able to ambulate around hospital floor twice with nursing per PT note  -Discontinued high intensity IV Heparin  -Transitioned to PO Eliquis 10 mg bid for 7 days then will de-escalate to 5 mg bid thereafter    SIRS  Leukocytosis  1 out 4  WBC 17.12  -Blood cx pending  -Urine cx negative  -Discontinued abx yesterday, WBC uptrended from 13.32 to 17.12, all prior cx NTD, source unknown  -Considering stress response 2/2 PE vs active infection given patient asymptomatic with stable vital signs  -Restarted IV Zosyn    Symptomatic microcytic anemia  Abnormal uterine bleeding  HgB 9.9  -Gyn consulted, appreciate their assistance  -Aygestin discontinued  -Patient has declined pelvic exam and EMB  today  -Currently hemodynamically stable  -Transfuse for Hemoglobin <7  - hx of MISTI, will continue supplementation with PO iron every other day  -Will continue to trend H&H     Hyperchloremic non-anion gap metabolic acidosis (Resolved)  CO2 23  -S/p transfusion of 4 units thus far  -Will continue to monitor with daily BMP    Hypokalemia (Resolved)    CODE STATUS: Full Code   Access: PICC  Antibiotics: Zosyn   Analgesia: acetaminophen  Diet: Diet Adult Regular  DVT Prophylaxis: Eliquis  GI Prophylaxis: proton pump inhibitor per orders  O2: Room air  Fluids: none    Consults: OB/GYN     Disposition: Patient to remain inpatient for continued IV abx.    The patient was seen with and plan was discussed with Dr. Alba, who agrees.    Giovanny Dinh MD  HO-1, Internal Medicine  7/28/2023  11:02 AM

## 2023-07-29 LAB
ANION GAP SERPL CALC-SCNC: 11 MEQ/L
BACTERIA BLD CULT: NORMAL
BACTERIA BLD CULT: NORMAL
BUN SERPL-MCNC: 6.4 MG/DL (ref 9.8–20.1)
CALCIUM SERPL-MCNC: 8.8 MG/DL (ref 8.4–10.2)
CHLORIDE SERPL-SCNC: 108 MMOL/L (ref 98–107)
CO2 SERPL-SCNC: 22 MMOL/L (ref 22–29)
CREAT SERPL-MCNC: 1.33 MG/DL (ref 0.55–1.02)
CREAT/UREA NIT SERPL: 5
ERYTHROCYTE [DISTWIDTH] IN BLOOD BY AUTOMATED COUNT: 25.9 % (ref 11.5–17)
GFR SERPLBLD CREATININE-BSD FMLA CKD-EPI: 48 MLS/MIN/1.73/M2
GLUCOSE SERPL-MCNC: 104 MG/DL (ref 74–100)
HCT VFR BLD AUTO: 32.3 % (ref 37–47)
HCT VFR BLD AUTO: 33.6 % (ref 37–47)
HGB BLD-MCNC: 9.3 G/DL (ref 12–16)
HGB BLD-MCNC: 9.6 G/DL (ref 12–16)
MCH RBC QN AUTO: 21.3 PG (ref 27–31)
MCHC RBC AUTO-ENTMCNC: 28.6 G/DL (ref 33–36)
MCV RBC AUTO: 74.5 FL (ref 80–94)
NRBC BLD AUTO-RTO: 0 %
PLATELET # BLD AUTO: 366 X10(3)/MCL (ref 130–400)
PLATELETS.RETICULATED NFR BLD AUTO: 6.1 % (ref 0.9–11.2)
PMV BLD AUTO: 11.4 FL (ref 7.4–10.4)
POTASSIUM SERPL-SCNC: 4 MMOL/L (ref 3.5–5.1)
RBC # BLD AUTO: 4.51 X10(6)/MCL (ref 4.2–5.4)
SODIUM SERPL-SCNC: 141 MMOL/L (ref 136–145)
WBC # SPEC AUTO: 18.07 X10(3)/MCL (ref 4.5–11.5)

## 2023-07-29 PROCEDURE — 99900035 HC TECH TIME PER 15 MIN (STAT)

## 2023-07-29 PROCEDURE — 80048 BASIC METABOLIC PNL TOTAL CA: CPT

## 2023-07-29 PROCEDURE — 25000003 PHARM REV CODE 250

## 2023-07-29 PROCEDURE — 21400001 HC TELEMETRY ROOM

## 2023-07-29 PROCEDURE — 63600175 PHARM REV CODE 636 W HCPCS: Performed by: STUDENT IN AN ORGANIZED HEALTH CARE EDUCATION/TRAINING PROGRAM

## 2023-07-29 PROCEDURE — 25000003 PHARM REV CODE 250: Performed by: STUDENT IN AN ORGANIZED HEALTH CARE EDUCATION/TRAINING PROGRAM

## 2023-07-29 PROCEDURE — 25000003 PHARM REV CODE 250: Performed by: INTERNAL MEDICINE

## 2023-07-29 PROCEDURE — 94761 N-INVAS EAR/PLS OXIMETRY MLT: CPT

## 2023-07-29 PROCEDURE — A4216 STERILE WATER/SALINE, 10 ML: HCPCS | Performed by: INTERNAL MEDICINE

## 2023-07-29 PROCEDURE — 85027 COMPLETE CBC AUTOMATED: CPT | Performed by: STUDENT IN AN ORGANIZED HEALTH CARE EDUCATION/TRAINING PROGRAM

## 2023-07-29 PROCEDURE — 85014 HEMATOCRIT: CPT | Performed by: STUDENT IN AN ORGANIZED HEALTH CARE EDUCATION/TRAINING PROGRAM

## 2023-07-29 RX ORDER — LEVOFLOXACIN 750 MG/1
750 TABLET ORAL DAILY
Status: DISCONTINUED | OUTPATIENT
Start: 2023-07-29 | End: 2023-07-31 | Stop reason: HOSPADM

## 2023-07-29 RX ORDER — DOXYCYCLINE HYCLATE 100 MG
100 TABLET ORAL EVERY 12 HOURS
Status: DISCONTINUED | OUTPATIENT
Start: 2023-07-29 | End: 2023-07-31 | Stop reason: HOSPADM

## 2023-07-29 RX ADMIN — APIXABAN 10 MG: 2.5 TABLET, FILM COATED ORAL at 08:07

## 2023-07-29 RX ADMIN — LEVOFLOXACIN 750 MG: 750 TABLET, FILM COATED ORAL at 12:07

## 2023-07-29 RX ADMIN — FERROUS SULFATE TAB 325 MG (65 MG ELEMENTAL FE) 1 EACH: 325 (65 FE) TAB at 09:07

## 2023-07-29 RX ADMIN — PANTOPRAZOLE SODIUM 40 MG: 40 TABLET, DELAYED RELEASE ORAL at 09:07

## 2023-07-29 RX ADMIN — APIXABAN 10 MG: 2.5 TABLET, FILM COATED ORAL at 09:07

## 2023-07-29 RX ADMIN — SODIUM CHLORIDE, PRESERVATIVE FREE 10 ML: 5 INJECTION INTRAVENOUS at 12:07

## 2023-07-29 RX ADMIN — MUPIROCIN: 20 OINTMENT TOPICAL at 09:07

## 2023-07-29 RX ADMIN — SODIUM CHLORIDE, PRESERVATIVE FREE 10 ML: 5 INJECTION INTRAVENOUS at 05:07

## 2023-07-29 RX ADMIN — PIPERACILLIN AND TAZOBACTAM 4.5 G: 4; .5 INJECTION, POWDER, LYOPHILIZED, FOR SOLUTION INTRAVENOUS; PARENTERAL at 09:07

## 2023-07-29 RX ADMIN — DOXYCYCLINE HYCLATE 100 MG: 100 TABLET, COATED ORAL at 12:07

## 2023-07-29 RX ADMIN — PIPERACILLIN AND TAZOBACTAM 4.5 G: 4; .5 INJECTION, POWDER, LYOPHILIZED, FOR SOLUTION INTRAVENOUS; PARENTERAL at 01:07

## 2023-07-29 RX ADMIN — DOXYCYCLINE HYCLATE 100 MG: 100 TABLET, COATED ORAL at 08:07

## 2023-07-29 RX ADMIN — SODIUM CHLORIDE, PRESERVATIVE FREE 10 ML: 5 INJECTION INTRAVENOUS at 11:07

## 2023-07-29 NOTE — PLAN OF CARE
54 yo known with large uterus and uterine fibroids currently admitted for symptomatic anemia as well as treatment of pulmonary embolisms. GYN consulted     Interval Events:  - While in house diagnosed with Pes, DVT studies negative, s/p heparin now transitioning to DOAC  - She has received a total of 4 units of pRBC while in house  - Throughout hospitalization initially continued on aygestin which has now been discontinued due to minimize any clot risk  - Patient has not had any significant bleeding in house    This afternoon patient reports feeling winded somewhat after ambulating. We had initially planned for EMB in the office following cytotec administration and paracervical block given concern that there is an underlying malignancy.     - Patient reports she understand there may be underlying malignancy and foregoing EMB could delay diagnosis, however she is not feeling up to the EMB nad does not want to proceed without it being in the operating, also declines pelvic at this time  - Understand HSC D&C is too high risk at this time for cardiopulmonary complication  - Encouraged to continue to monitor bleeding   - Follow-up scheduled for 9/2023 however patient informed that she can call or come to clinic whenever she is ready for pelvic +/- EMB    Patient understands and agrees with plan. GYN to sign off at this time, but available for re-consultation if any concerns for heavy bleeding or other gyn concerns    Bedside check performed with Dr. Provost Regina Castellano  [facility], PGY4

## 2023-07-29 NOTE — CARE UPDATE
Patient would not like to have labs drawn today anymore. H and H ordered Q8 due to abnormal uterine bleeding and PEs  H&H this a.m. is 9.6 with repeat at noon 9.3.  Patient reports that she has not had any heavy bleeding today only changing pads twice with minimal blood.  Educated patient on reasoning of labs and patient acknowledged and continued to refuse. Will change to am lab draw.    Maryellen Joseph MD  Internal Medicine, PGY-1

## 2023-07-29 NOTE — PROGRESS NOTES
[facility] Medicine Wards   Progress Note     Resident Team: [facility] Medicine List 3  Attending Physician: Destiny Alba MD  Resident: Lashanda Moran DO  Hospital Length of Stay: 6 days    Subjective:      Brief HPI:  52 y.o.  female with a history of abnormal uterine bleeding who presented to [facility] ED on 2023  with complaint of feeling short of breath.  Patient reports that she began feeling short of breath on 2023.  She reports that she is a history of symptomatic anemia secondary to abnormal uterine bleeding.  She reports her last menstrual cycle was on  with heavy flow and multiple clots.  She reports that her cycle completed at the beginning of July however she is been spotting since then.  She reports no abnormal uterine discharge, foul odors, rashes, hematuria, hematemesis, hematochezia, melena, bright red blood per rectum, bruises, hemoptysis.  She does report ongoing dyspnea on exertion since onset of symptoms, and reports bilateral lower extremity swelling.  Reports no chest pains, no palpitations, no fevers, no chills, no sick contacts, no trauma, no abdominal pain, no nausea, no vomiting, no diarrhea.  Has no other complaints at this time.  In the ED, found to have H/H of 5.3/19.8.  [facility] was consulted for admission.    Significant Events/Hospital Course:   : 3 units pRBC  : CT-PE with  pulmonary emboli within right lower lobe segmental and subsegmental branches as well as suspected within left lower lobe subsegmental branches, started on heparin gtt  : 1 unit pRBC  : Discontinued Aygestin  : transitioned to eliquis  : begin having uterine bleeding    24 hour Interval History:   Patient reports that she has begun her menstrual cycle and had 1 episode large amount uterine bleeding.  She reports no shortness a breath, palpitations, syncope, chest pains at this time.  Reports some mild abdominal pain.  Was transitioned to Eliquis yesterday.  Reports no  fevers, no chills, no coughing, no dysuria, no diarrhea.  Has no other complaints at this time.      Review of Systems:  Pertinent items are noted in HPI.     Objective:     Vital Signs (Most Recent):  Temp: 98.4 °F (36.9 °C) (07/29/23 0752)  Pulse: 79 (07/29/23 0752)  Resp: 18 (07/29/23 0752)  BP: 132/75 (07/29/23 0752)  SpO2: 99 % (07/29/23 0752) Vital Signs (24h Range):  Temp:  [97.7 °F (36.5 °C)-98.6 °F (37 °C)] 98.4 °F (36.9 °C)  Pulse:  [75-95] 79  Resp:  [18] 18  SpO2:  [96 %-100 %] 99 %  BP: (102-164)/(65-88) 132/75       Physical Examination:  General:  Obese female, resting comfortably in bed, in no acute distress   Eye: EOMI, clear conjunctiva, anicteric  HENT: Head-normocephalic and atraumatic  Neck:  No gross thyromegaly, trachea midline, supple  Respiratory: clear to auscultation bilaterally without wheezes, rales, rhonchi  Cardiovascular: regular rate and rhythm without murmurs.  No gallops or rubs no JVD.  Gastrointestinal:  Obese abdomen, soft, nontender, no organomegaly, bowel sounds in all 4 quadrants  Musculoskeletal:  No gross deformities, edema noted 1+ nonpitting to bilateral ankles  Integumentary: no rashes or skin lesions on exposed present  Neurologic: no signs of peripheral neurological deficit, motor/sensory function intact  Psychiatric:  alert and oriented, cognitive function intact, cooperative with exam    Laboratory:  Most Recent Data:  CBC:  Recent Labs   Lab 07/27/23  0419 07/28/23  0419 07/29/23  0337   WBC 13.32* 17.12* 18.07*   HGB 9.0* 9.9* 9.6*   HCT 31.1* 33.2* 33.6*    346 366   MCV 73.0* 73.9* 74.5*   RDW 24.9* 25.6* 25.9*     CMP:  Recent Labs   Lab 07/23/23  1457 07/24/23  0124 07/26/23  1317 07/27/23  0419 07/28/23  0440 07/29/23  0337   K 3.0* 4.1   < > 3.7 4.0 4.0   CO2 20* 17*   < > 21* 23 22   BUN 9.2* 6.4*   < > 6.4* 7.1* 6.4*   CREATININE 0.93 0.88   < > 0.94 1.05* 1.33*   ALBUMIN 3.2* 3.3*  --   --   --   --    BILITOT 0.7 1.4  --   --   --   --    AST 38*  44*  --   --   --   --    ALKPHOS 52 47  --   --   --   --    ALT 28 37  --   --   --   --     < > = values in this interval not displayed.     Coags:   Recent Labs   Lab 07/23/23  1457 07/24/23  0124 07/24/23  1801 07/25/23 1957   INR  --   --  1.25 1.2   LABPROT 7.4 7.7  --   --      DM:   Recent Labs   Lab 07/27/23  0419 07/28/23  0440 07/29/23  0337   CREATININE 0.94 1.05* 1.33*     Cardiac:   Recent Labs   Lab 07/23/23  1457   TROPONINI 0.012   BNP 57.3     Pulm:     Recent Labs   Lab 07/24/23  0050   PO2 71.0*      Urinalysis:   Lab Results   Component Value Date    LABURIN No Growth 07/25/2023    APPEARANCEUA Clear 07/28/2023    PROTEINUA Negative 07/28/2023    UROBILINOGEN Normal 07/28/2023    BILIRUBINUA Negative 07/28/2023    RBCUA 21-50 (A) 07/28/2023    WBCUA 0-5 07/28/2023       Trended Cardiac Data:  Recent Labs   Lab 07/23/23  1457   TROPONINI 0.012   BNP 57.3       Microbiology Data Reviewed: yes  Pertinent Findings:  No growth to date cultures repeated with no growth to date    Other Results:  EKG (my interpretation): sinus tachycardia.    Radiology:  Imaging Results              CTA Chest Non-Coronary (PE Studies) (Final result)  Result time 07/24/23 12:53:58      Final result by Sheldon Guevara MD (07/24/23 12:53:58)                   Impression:      1. There do appear to be pulmonary emboli within right lower lobe segmental and subsegmental branches as well as suspected within left lower lobe subsegmental branches.  Note motion artifact does limit evaluation.  No CT evidence of right heart strain.  2. Epic message sent to and received by Dr. Lashanda Moran at 12:53 hours on 07/24/2023.      Electronically signed by: Sheldon Guevara MD  Date:    07/24/2023  Time:    12:53               Narrative:    EXAMINATION:  CTA CHEST NON CORONARY (PE STUDIES)    CLINICAL HISTORY:  Shortness of breath, unknown D-dimer.    TECHNIQUE:  Axial CTA images of the chest were obtained with intravenous contrast utilizing  PE protocol.  Coronal and sagittal MIP reformations were obtained. Automated exposure control was utilized. Total exam DLP is 904 mGy cm.    COMPARISON:  Chest radiograph 07/24/2023    FINDINGS:  There is motion artifact of breathing as well as cardiac motion.  There is streak artifact from contrast within the SVC.  There are filling defects demonstrated within the right lower lobe segmental and subsegmental pulmonary arterial branches.  There are suspected filling defects within left lower lobe subsegmental pulmonary arterial branches.  There is no saddle embolus.  There is no CT evidence of right heart strain.  There is small pericardial effusion identified.  Thoracic aorta is not aneurysmal.  No lymphadenopathy is identified within the chest.  No pleural effusion is identified.  There is no consolidation, pneumothorax, or lung mass identified.  Included portions of the upper abdomen demonstrate no acute abnormality.  No acute displaced fractures identified.                                       X-Ray Chest AP Portable (Final result)  Result time 07/23/23 15:30:31      Final result by Rodrigue Obrien MD (07/23/23 15:30:31)                   Impression:      No acute cardiopulmonary process.      Electronically signed by: Rodrigue Obrien  Date:    07/23/2023  Time:    15:30               Narrative:    EXAMINATION:  XR CHEST AP PORTABLE    CLINICAL HISTORY:  SOB;    TECHNIQUE:  Single view of the chest    COMPARISON:  03/03/2015    FINDINGS:  No focal opacification, pleural effusion, or pneumothorax.    The cardiac silhouette remains prominent.    No acute osseous abnormality.                                      Current Medications:     Infusions:       Scheduled:   apixaban  10 mg Oral BID    doxycycline  100 mg Oral Q12H    ferrous sulfate  1 tablet Oral Every Other Day    levoFLOXacin  750 mg Oral Daily    pantoprazole  40 mg Oral Daily    sodium chloride 0.9%  10 mL Intravenous Q6H        PRN:  0.9%  NaCl  infusion (for blood administration), 0.9%  NaCl infusion (for blood administration), sodium chloride 0.9%, sodium chloride 0.9%, acetaminophen, labetalol, melatonin, ondansetron, sodium chloride 0.9%, Flushing PICC Protocol **AND** sodium chloride 0.9% **AND** sodium chloride 0.9%    Antibiotics and Day Number of Therapy:  Zosyn day 5      Intake/Output Summary (Last 24 hours) at 7/29/2023 1045  Last data filed at 7/28/2023 2150  Gross per 24 hour   Intake 898.96 ml   Output --   Net 898.96 ml       Lines/Drains/Airways       Peripherally Inserted Central Catheter Line  Duration             PICC Double Lumen 07/24/23 1543 right brachial 4 days                      Assessment & Plan:     Bilateral Pulmonary Emboli  Acute Hypoxic Respiratory Failure - resolved  - CT-PE with pulmonary emboli within right lower lobe segmental and subsegmental branches as well as suspected within left lower lobe subsegmental branches.  -successfully transitioned to [facility], can discharge with this p.o.  -will need 6 month anticoagulation therapy with discussion for further therapy at that time     Symptomatic microcytic anemia  Abnormal uterine bleeding  - gyn consulted, appreciate their assistance   - hormones has continued  - currently hemodynamically stable  - Type and screen, blood consent obtained  - Transfuse for Hemoglobin <7  - hx of MISTI, will continue supplementation   - has began her menstrual cycle, close monitoring of H&H q.8 hours and transfuse as needed     Leukocytosis  -persistent leukocytosis  -will initiate p.o. antibiotic therapy with Levaquin, doxycycline (day 5) will plan for 14 total day course  - improved however still intermittently positive  -will pan culture, ngtd, UA pending  - fevers and leukocytosis maybe stress reaction 2/2 to #1      CODE STATUS: Full Code   Access: PICC  Antibiotics: levaquin/doxycycline   Analgesia:  Acetaminophen  Diet: Diet Adult Regular  DVT Prophylaxis:  Eliquis  GI Prophylaxis:  proton pump inhibitor per orders  O2:  Room air  Fluids:  None  Consults:  OBGYN    Disposition: day 6 of admission for  bilateral subsegmental pulmonary emboli, transitioned to Eliquis therapy, transitioned to p.o. antibiotics, monitoring q.8 hours H&H for abnormal uterine bleeding      Lashanda Moran DO  [facility], [facility]  07/29/2023

## 2023-07-30 LAB
ANION GAP SERPL CALC-SCNC: 8 MEQ/L
BASOPHILS # BLD AUTO: 0.06 X10(3)/MCL
BASOPHILS NFR BLD AUTO: 0.4 %
BUN SERPL-MCNC: 9.3 MG/DL (ref 9.8–20.1)
CALCIUM SERPL-MCNC: 9 MG/DL (ref 8.4–10.2)
CHLORIDE SERPL-SCNC: 108 MMOL/L (ref 98–107)
CO2 SERPL-SCNC: 24 MMOL/L (ref 22–29)
CREAT SERPL-MCNC: 1.19 MG/DL (ref 0.55–1.02)
CREAT/UREA NIT SERPL: 8
EOSINOPHIL # BLD AUTO: 0.23 X10(3)/MCL (ref 0–0.9)
EOSINOPHIL NFR BLD AUTO: 1.4 %
ERYTHROCYTE [DISTWIDTH] IN BLOOD BY AUTOMATED COUNT: 26.2 % (ref 11.5–17)
GFR SERPLBLD CREATININE-BSD FMLA CKD-EPI: 55 MLS/MIN/1.73/M2
GLUCOSE SERPL-MCNC: 85 MG/DL (ref 74–100)
HCT VFR BLD AUTO: 31.4 % (ref 37–47)
HCT VFR BLD AUTO: 31.6 % (ref 37–47)
HGB BLD-MCNC: 9 G/DL (ref 12–16)
HGB BLD-MCNC: 9.1 G/DL (ref 12–16)
IMM GRANULOCYTES # BLD AUTO: 0.16 X10(3)/MCL (ref 0–0.04)
IMM GRANULOCYTES NFR BLD AUTO: 1 %
LYMPHOCYTES # BLD AUTO: 1.83 X10(3)/MCL (ref 0.6–4.6)
LYMPHOCYTES NFR BLD AUTO: 11 %
MCH RBC QN AUTO: 21.8 PG (ref 27–31)
MCHC RBC AUTO-ENTMCNC: 29 G/DL (ref 33–36)
MCV RBC AUTO: 75.3 FL (ref 80–94)
MONOCYTES # BLD AUTO: 1.3 X10(3)/MCL (ref 0.1–1.3)
MONOCYTES NFR BLD AUTO: 7.8 %
NEUTROPHILS # BLD AUTO: 13.1 X10(3)/MCL (ref 2.1–9.2)
NEUTROPHILS NFR BLD AUTO: 78.4 %
NRBC BLD AUTO-RTO: 0 %
PLATELET # BLD AUTO: 487 X10(3)/MCL (ref 130–400)
PMV BLD AUTO: 11 FL (ref 7.4–10.4)
POTASSIUM SERPL-SCNC: 4.9 MMOL/L (ref 3.5–5.1)
RBC # BLD AUTO: 4.17 X10(6)/MCL (ref 4.2–5.4)
SODIUM SERPL-SCNC: 140 MMOL/L (ref 136–145)
WBC # SPEC AUTO: 16.68 X10(3)/MCL (ref 4.5–11.5)

## 2023-07-30 PROCEDURE — 97530 THERAPEUTIC ACTIVITIES: CPT

## 2023-07-30 PROCEDURE — 25000003 PHARM REV CODE 250: Performed by: INTERNAL MEDICINE

## 2023-07-30 PROCEDURE — 85025 COMPLETE CBC W/AUTO DIFF WBC: CPT

## 2023-07-30 PROCEDURE — A4216 STERILE WATER/SALINE, 10 ML: HCPCS | Performed by: INTERNAL MEDICINE

## 2023-07-30 PROCEDURE — 25000003 PHARM REV CODE 250

## 2023-07-30 PROCEDURE — 94761 N-INVAS EAR/PLS OXIMETRY MLT: CPT

## 2023-07-30 PROCEDURE — 80048 BASIC METABOLIC PNL TOTAL CA: CPT

## 2023-07-30 PROCEDURE — 21400001 HC TELEMETRY ROOM

## 2023-07-30 PROCEDURE — 25000003 PHARM REV CODE 250: Performed by: STUDENT IN AN ORGANIZED HEALTH CARE EDUCATION/TRAINING PROGRAM

## 2023-07-30 PROCEDURE — 85014 HEMATOCRIT: CPT | Performed by: STUDENT IN AN ORGANIZED HEALTH CARE EDUCATION/TRAINING PROGRAM

## 2023-07-30 RX ADMIN — SODIUM CHLORIDE, PRESERVATIVE FREE 10 ML: 5 INJECTION INTRAVENOUS at 12:07

## 2023-07-30 RX ADMIN — DOXYCYCLINE HYCLATE 100 MG: 100 TABLET, COATED ORAL at 09:07

## 2023-07-30 RX ADMIN — PANTOPRAZOLE SODIUM 40 MG: 40 TABLET, DELAYED RELEASE ORAL at 09:07

## 2023-07-30 RX ADMIN — APIXABAN 10 MG: 2.5 TABLET, FILM COATED ORAL at 08:07

## 2023-07-30 RX ADMIN — LEVOFLOXACIN 750 MG: 750 TABLET, FILM COATED ORAL at 09:07

## 2023-07-30 RX ADMIN — DOXYCYCLINE HYCLATE 100 MG: 100 TABLET, COATED ORAL at 08:07

## 2023-07-30 RX ADMIN — SODIUM CHLORIDE, PRESERVATIVE FREE 10 ML: 5 INJECTION INTRAVENOUS at 05:07

## 2023-07-30 RX ADMIN — APIXABAN 10 MG: 2.5 TABLET, FILM COATED ORAL at 09:07

## 2023-07-30 NOTE — PLAN OF CARE
Problem: Adult Inpatient Plan of Care  Goal: Plan of Care Review  Outcome: Ongoing, Progressing  Goal: Patient-Specific Goal (Individualized)  Outcome: Ongoing, Progressing  Goal: Absence of Hospital-Acquired Illness or Injury  Outcome: Ongoing, Progressing  Goal: Optimal Comfort and Wellbeing  Outcome: Ongoing, Progressing  Goal: Readiness for Transition of Care  Outcome: Ongoing, Progressing     Problem: Bariatric Environmental Safety  Goal: Safety Maintained with Care  Outcome: Ongoing, Progressing     Problem: Activity Intolerance  Goal: Enhanced Capacity and Energy  Outcome: Ongoing, Progressing     Problem: Skin Injury Risk Increased  Goal: Skin Health and Integrity  Outcome: Ongoing, Progressing     Problem: Infection  Goal: Absence of Infection Signs and Symptoms  Outcome: Ongoing, Progressing     Problem: Anemia  Goal: Anemia Symptom Improvement  Outcome: Ongoing, Progressing     Problem: Fall Injury Risk  Goal: Absence of Fall and Fall-Related Injury  Outcome: Ongoing, Progressing

## 2023-07-30 NOTE — PT/OT/SLP PROGRESS
Physical Therapy Treatment and Discharge Note    Patient Name:  Kely Sparks   MRN:  [id number]    Recommendations     Discharge Recommendations:  home   Discharge Equipment Recommendations: none   Barriers to discharge: none    Assessment     Kely Sparks is a 53 y.o. female admitted with a medical diagnosis of Iron deficiency anemia due to chronic blood loss.    Pt has met all functional mobility goals. PT signing off.     Rehab Prognosis: Good.    Patient's current level of function is at baseline (PLOF). Patient does not require further acute PT services.     Recent Surgery: * No surgery found *      Plan     Patient discharged from acute PT Services on 07/30/23.    Anticipated patient disposition at hospital discharge: home or self care per chart.    Subjective     Communicated with patient's nurse Eloise prior to session.    Patient agreeable to participate in treatment session.    Chief Complaint: none  Patient/Family Comments/goals: to go home  Pain/Comfort:  Pain Rating 1: 0/10    Objective     Patient found supine in bed and with HOB elevated with peripheral IV  upon PT entry to room.    General Precautions: Standard, fall   Orthopedic Precautions:N/A   Braces: N/A  Respiratory Status: room air    Functional Mobility:    Bed Mobility:  Supine to Sit: independence  Sit to Supine: independence    Transfers:  Sit to Stand: independence with no assistive device    Gait:  Patient ambulated 310ft with no assistive device and independence.  Patient demonstrates steady gait, symmetrical step length, and upright posture.    Other Mobility:  Steps: Patient ascended/descended 4 steps with no device with left handrail with independence    Patient left supine in bed and with HOB elevated with all lines intact and call button in reach.    Goals     Multidisciplinary Problems       Physical Therapy Goals       Not on file              Multidisciplinary Problems (Resolved)          Problem:  Physical Therapy    Goal Priority Disciplines Outcome Goal Variances Interventions   Physical Therapy Goal   (Resolved)     PT, PT/OT Met     Description: Goals to be met by: 2023     Patient will increase functional independence with mobility by performin. Gait  x 300 feet with [place] using No Assistive Device.   2. Ascend/descend 4 stair with left Handrails [place] using No Assistive Device.                        Time Tracking     PT Received On: 23  PT Start Time: 1107     PT Stop Time: 1117  PT Total Time (min): 10 min     Billable Minutes: Therapeutic Activity 10 mins    2023

## 2023-07-30 NOTE — PROGRESS NOTES
[facility] Medicine Wards Progress Note     Resident Team: [facility] Medicine List 3  Attending Physician: Destiny Alba MD  Resident: Lashanda Moran MD HO-III  Intern: Giovanny Dinh MD HO-I       Subjective:      Brief HPI:    Kely Sparks is a 52 y.o.  female with a history of abnormal uterine bleeding who presented to [facility] ED on 2023  with complaint of feeling short of breath.  Patient reports that she began feeling short of breath on 2023.  She reports that she is a history of symptomatic anemia secondary to abnormal uterine bleeding.  She reports her last menstrual cycle was on  with heavy flow and multiple clots.  She reports that her cycle completed at the beginning of July however she is been spotting since then.  She reports no abnormal uterine discharge, foul odors, rashes, hematuria, hematemesis, hematochezia, melena, bright red blood per rectum, bruises, hemoptysis.  She does report ongoing dyspnea on exertion since onset of symptoms, and reports bilateral lower extremity swelling.  Reports no chest pains, no palpitations, no fevers, no chills, no sick contacts, no trauma, no abdominal pain, no nausea, no vomiting, no diarrhea.  Has no other complaints at this time.  In the ED, found to have H/H of 5.3/19.8.  [facility] was consulted for admission.    Interval History:   : Given 3 units of blood  : CT-PE with  pulmonary emboli within right lower lobe segmental and subsegmental branches as well as suspected within left lower lobe subsegmental branches, started on heparin gtt  : Given 1 unit of blood. Increased to high intensity IV Heparin as patient was not clinically improving from respiratory standpoint. Attempting transfer to higher level of care facility.  : Patient markedly improved after switching to high intensity IV Heparin. Attempting transfer to higher level of care facility.  : Discontinued Aygestin   : Transitioned to Eliquis      VSS. Afebrile. BP well controlled. Patient no longer experiencing sob at rest nor with ambulation.  Patient endorses heavy vaginal bleeding this morning with passage of large clots that she states approximate size of a baseball.  Denies any continued bleeding.  Denies hematemesis, hematochezia, melena, or hematuria.  Denies lightheadedness, dizziness, or vertigo.  Denies palpitations chest pain or shortness of breath.  Able to ambulate freely this morning with minimal limitation.  Denies any other acute complaints.    Review of Systems:  Review of Systems   Constitutional:  Negative for chills, diaphoresis, fatigue and fever.   HENT:  Negative for ear pain, hearing loss, nosebleeds, rhinorrhea and sore throat.    Eyes:  Negative for pain, redness and visual disturbance.   Respiratory:  Negative for cough, chest tightness and shortness of breath.    Cardiovascular:  Negative for chest pain and leg swelling.   Gastrointestinal:  Negative for abdominal pain, constipation, diarrhea, nausea and vomiting.   Genitourinary:  Positive for vaginal bleeding. Negative for dysuria, frequency, hematuria and urgency.   Musculoskeletal:  Negative for arthralgias and myalgias.   Skin:  Negative for pallor, rash and wound.   Neurological:  Negative for dizziness, weakness, light-headedness, numbness and headaches.   Psychiatric/Behavioral:  Negative for agitation and confusion.         Objective:     Last 24 Hour Vital Signs:  BP  Min: 132/83  Max: 141/74  Temp  Av.6 °F (37 °C)  Min: 98.2 °F (36.8 °C)  Max: 99.1 °F (37.3 °C)  Pulse  Av.5  Min: 68  Max: 95  Resp  Av.4  Min: 18  Max: 20  SpO2  Av.9 %  Min: 96 %  Max: 100 %  Weight  Av.9 kg (262 lb 2 oz)  Min: 118.9 kg (262 lb 2 oz)  Max: 118.9 kg (262 lb 2 oz)  I/O last 3 completed shifts:  In: 1219.9 [P.O.:720; I.V.:302.5; IV Piggyback:197.4]  Out: -     Physical Examination:  Physical Exam  Vitals and nursing note reviewed.   Constitutional:        General: She is not in acute distress.     Appearance: Normal appearance. She is obese. She is not ill-appearing or diaphoretic.   HENT:      Head: Normocephalic and atraumatic.      Right Ear: Tympanic membrane, ear canal and external ear normal.      Left Ear: Tympanic membrane, ear canal and external ear normal.      Nose: Nose normal.      Mouth/Throat:      Mouth: Mucous membranes are moist.      Pharynx: Oropharynx is clear.   Eyes:      Extraocular Movements: Extraocular movements intact.      Conjunctiva/sclera: Conjunctivae normal.      Pupils: Pupils are equal, round, and reactive to light.   Cardiovascular:      Rate and Rhythm: Normal rate and regular rhythm.      Pulses: Normal pulses.      Heart sounds: Normal heart sounds. No murmur heard.     No friction rub. No gallop.   Pulmonary:      Effort: Pulmonary effort is normal.      Breath sounds: Normal breath sounds. No stridor. No wheezing, rhonchi or rales.   Abdominal:      General: Abdomen is flat. Bowel sounds are normal.      Palpations: Abdomen is soft.   Musculoskeletal:         General: No swelling or deformity. Normal range of motion.      Cervical back: Normal range of motion and neck supple.   Skin:     General: Skin is warm and dry.      Capillary Refill: Capillary refill takes less than 2 seconds.      Findings: No rash.   Neurological:      Comments: AAOx4; CN II-XII intact   Psychiatric:         Mood and Affect: Mood normal.         Behavior: Behavior normal.       Laboratory:  Most Recent Data:  CBC:   Lab Results   Component Value Date    WBC 18.07 (H) 07/29/2023    HGB 9.0 (L) 07/30/2023    HCT 31.6 (L) 07/30/2023     07/29/2023    MCV 74.5 (L) 07/29/2023    RDW 25.9 (H) 07/29/2023     WBC Differential:   Recent Labs   Lab 07/25/23  1957 07/26/23  0711 07/27/23  0419 07/28/23  0419 07/29/23  0337 07/29/23  1219 07/30/23  0427   WBC 11.36 12.60* 13.32* 17.12* 18.07*  --   --    HGB 8.9* 8.3* 9.0* 9.9* 9.6* 9.3* 9.0*   HCT 30.0*  28.1* 31.1* 33.2* 33.6* 32.3* 31.6*    253 255 346 366  --   --    MCV 71.6* 71.7* 73.0* 73.9* 74.5*  --   --        BMP:   Lab Results   Component Value Date     07/29/2023    K 4.0 07/29/2023    CO2 22 07/29/2023    BUN 6.4 (L) 07/29/2023    CREATININE 1.33 (H) 07/29/2023    CALCIUM 8.8 07/29/2023    MG 1.90 07/24/2023    PHOS 2.7 07/24/2023     LFTs:   Lab Results   Component Value Date    ALBUMIN 3.3 (L) 07/24/2023    BILITOT 1.4 07/24/2023    AST 44 (H) 07/24/2023    ALKPHOS 47 07/24/2023    ALT 37 07/24/2023     Coags:   Lab Results   Component Value Date    INR 1.2 07/25/2023    PROTIME 14.8 (H) 07/25/2023    PTT 62.6 07/28/2023     FLP:   Lab Results   Component Value Date    CHOL 139 10/19/2022    HDL 40 10/19/2022    TRIG 29 (L) 10/19/2022     DM:   Lab Results   Component Value Date    HGBA1C 5.2 10/19/2022    CREATININE 1.33 (H) 07/29/2023     Thyroid:   Lab Results   Component Value Date    TSH 0.5711 05/26/2022     Anemia:   Lab Results   Component Value Date    IRON 9 (L) 05/29/2023    TIBC 332 05/29/2023    FERRITIN 5.29 01/20/2023    LMFQAWUB07 569 01/21/2023    FOLATE 19.1 01/21/2023     Cardiac:   Lab Results   Component Value Date    TROPONINI 0.012 07/23/2023    BNP 57.3 07/23/2023       Microbiology Data Reviewed: yes  Pertinent Findings:  Blood cx pending  Urine cx negative    Other Results:  EKG (my interpretation): sinus tachycardia on admission. No repeat EKG needed to date.    Radiology:  Imaging Results              CTA Chest Non-Coronary (PE Studies) (Final result)  Result time 07/24/23 12:53:58      Final result by Sheldon Guevara MD (07/24/23 12:53:58)                   Impression:      1. There do appear to be pulmonary emboli within right lower lobe segmental and subsegmental branches as well as suspected within left lower lobe subsegmental branches.  Note motion artifact does limit evaluation.  No CT evidence of right heart strain.  2. Epic message sent to and received  by Dr. Lashanda Moran at 12:53 hours on 07/24/2023.      Electronically signed by: Sheldon Guevara MD  Date:    07/24/2023  Time:    12:53               Narrative:    EXAMINATION:  CTA CHEST NON CORONARY (PE STUDIES)    CLINICAL HISTORY:  Shortness of breath, unknown D-dimer.    TECHNIQUE:  Axial CTA images of the chest were obtained with intravenous contrast utilizing PE protocol.  Coronal and sagittal MIP reformations were obtained. Automated exposure control was utilized. Total exam DLP is 904 mGy cm.    COMPARISON:  Chest radiograph 07/24/2023    FINDINGS:  There is motion artifact of breathing as well as cardiac motion.  There is streak artifact from contrast within the SVC.  There are filling defects demonstrated within the right lower lobe segmental and subsegmental pulmonary arterial branches.  There are suspected filling defects within left lower lobe subsegmental pulmonary arterial branches.  There is no saddle embolus.  There is no CT evidence of right heart strain.  There is small pericardial effusion identified.  Thoracic aorta is not aneurysmal.  No lymphadenopathy is identified within the chest.  No pleural effusion is identified.  There is no consolidation, pneumothorax, or lung mass identified.  Included portions of the upper abdomen demonstrate no acute abnormality.  No acute displaced fractures identified.                                       X-Ray Chest AP Portable (Final result)  Result time 07/23/23 15:30:31      Final result by Rodrigue Obrien MD (07/23/23 15:30:31)                   Impression:      No acute cardiopulmonary process.      Electronically signed by: Rodrigue Obrien  Date:    07/23/2023  Time:    15:30               Narrative:    EXAMINATION:  XR CHEST AP PORTABLE    CLINICAL HISTORY:  SOB;    TECHNIQUE:  Single view of the chest    COMPARISON:  03/03/2015    FINDINGS:  No focal opacification, pleural effusion, or pneumothorax.    The cardiac silhouette remains prominent.    No acute  osseous abnormality.                                      Current Medications:     Infusions:         Scheduled:   apixaban  10 mg Oral BID    doxycycline  100 mg Oral Q12H    ferrous sulfate  1 tablet Oral Every Other Day    levoFLOXacin  750 mg Oral Daily    pantoprazole  40 mg Oral Daily    sodium chloride 0.9%  10 mL Intravenous Q6H        PRN:  0.9%  NaCl infusion (for blood administration), 0.9%  NaCl infusion (for blood administration), sodium chloride 0.9%, sodium chloride 0.9%, acetaminophen, labetalol, melatonin, ondansetron, sodium chloride 0.9%, Flushing PICC Protocol **AND** sodium chloride 0.9% **AND** sodium chloride 0.9%    Antibiotics and Day Number of Therapy:  Vanc discontinued (07/17/23)  IV Zosyn - day 5  P.o. Doxycycline - day 2    Lines and Day Number of Therapy:  PICC    Assessment & Plan:     Bilateral Pulmonary Emboli  -CT-PE with pulmonary emboli within right lower lobe segmental and subsegmental branches as well as suspected within left lower lobe subsegmental branches  -Transitioned to PO Eliquis 10 mg bid for 7 days (to be complete on 08/03/23) then will de-escalate to 5 mg bid thereafter for 6 months    SIRS  Leukocytosis  1 out 4  WBC 18.07  -Blood cx NTD  -Urine cx negative  -Considering stress response 2/2 PE vs active infection given patient asymptomatic with stable vital signs  -will recheck CBC to trend white count after patient refused blood draws yesterday  -Continue IV Zosyn and PO Doxy    Symptomatic microcytic anemia  Abnormal uterine bleeding  HgB 9.0  -Gyn consulted, appreciate their assistance  -Aygestin discontinued  -Currently hemodynamically stable  -Transfuse for Hemoglobin <7  - hx of MISTI, will continue supplementation with PO iron every other day  -Will continue to trend H&H     Hyperchloremic non-anion gap metabolic acidosis (Resolved)  CO2 23  -S/p transfusion of 4 units thus far  -Will continue to monitor with daily BMP    CODE STATUS: Full Code   Access:  PICC  Antibiotics: Zosyn/Doxy  Analgesia: Acetaminophen  Diet: Diet Adult Regular  DVT Prophylaxis: Eliquis  GI Prophylaxis: PO Pantoprazole 40 mg  O2: Room air  Fluids: none    Consults: OB/GYN     Disposition:  Patient to remain inpatient for observation due to patient reporting heavy menstrual bleeding and passage of large clots.  Will check BMP and CBC to trend electrolytes, WBC, and H&H.  Will plan to transition to IV antibiotics if white count continues to trend upward.  Will otherwise plan to transition to p.o. antibiotics if white count continues to trend downward in anticipation of discharge with close follow-up with Ob/gyn.    The patient was seen with and plan was discussed with Dr. Wall, who agrees.    Giovanny Dinh MD  -1, Internal Medicine  7/30/2023  10:19 AM

## 2023-07-31 VITALS
HEART RATE: 79 BPM | TEMPERATURE: 98 F | SYSTOLIC BLOOD PRESSURE: 154 MMHG | OXYGEN SATURATION: 98 % | RESPIRATION RATE: 18 BRPM | HEIGHT: 60 IN | DIASTOLIC BLOOD PRESSURE: 84 MMHG | WEIGHT: 262.13 LBS | BODY MASS INDEX: 51.46 KG/M2

## 2023-07-31 PROBLEM — R50.9 FEVER, UNSPECIFIED: Status: RESOLVED | Noted: 2023-07-24 | Resolved: 2023-07-31

## 2023-07-31 LAB
ANISOCYTOSIS BLD QL SMEAR: ABNORMAL
BASOPHILS # BLD AUTO: 0.09 X10(3)/MCL
BASOPHILS NFR BLD AUTO: 0.5 %
ELLIPTOCYTOSIS (OHS): SLIGHT
EOSINOPHIL # BLD AUTO: 0.32 X10(3)/MCL (ref 0–0.9)
EOSINOPHIL NFR BLD AUTO: 1.9 %
ERYTHROCYTE [DISTWIDTH] IN BLOOD BY AUTOMATED COUNT: 27.6 % (ref 11.5–17)
HCT VFR BLD AUTO: 29.7 % (ref 37–47)
HCT VFR BLD AUTO: 30.5 % (ref 37–47)
HGB BLD-MCNC: 8.4 G/DL (ref 12–16)
HGB BLD-MCNC: 8.5 G/DL (ref 12–16)
HYPOCHROMIA BLD QL SMEAR: ABNORMAL
IMM GRANULOCYTES # BLD AUTO: 0.17 X10(3)/MCL (ref 0–0.04)
IMM GRANULOCYTES NFR BLD AUTO: 1 %
LYMPHOCYTES # BLD AUTO: 1.58 X10(3)/MCL (ref 0.6–4.6)
LYMPHOCYTES NFR BLD AUTO: 9.5 %
MCH RBC QN AUTO: 21.3 PG (ref 27–31)
MCHC RBC AUTO-ENTMCNC: 27.5 G/DL (ref 33–36)
MCV RBC AUTO: 77.2 FL (ref 80–94)
MONOCYTES # BLD AUTO: 1.13 X10(3)/MCL (ref 0.1–1.3)
MONOCYTES NFR BLD AUTO: 6.8 %
NEUTROPHILS # BLD AUTO: 13.39 X10(3)/MCL (ref 2.1–9.2)
NEUTROPHILS NFR BLD AUTO: 80.3 %
NRBC BLD AUTO-RTO: 0 %
PLATELET # BLD AUTO: 486 X10(3)/MCL (ref 130–400)
PLATELET # BLD EST: ADEQUATE 10*3/UL
PLATELETS.RETICULATED NFR BLD AUTO: 4 % (ref 0.9–11.2)
PMV BLD AUTO: ABNORMAL FL
POIKILOCYTOSIS BLD QL SMEAR: ABNORMAL
RBC # BLD AUTO: 3.95 X10(6)/MCL (ref 4.2–5.4)
WBC # SPEC AUTO: 16.68 X10(3)/MCL (ref 4.5–11.5)

## 2023-07-31 PROCEDURE — 85014 HEMATOCRIT: CPT | Performed by: STUDENT IN AN ORGANIZED HEALTH CARE EDUCATION/TRAINING PROGRAM

## 2023-07-31 PROCEDURE — 94761 N-INVAS EAR/PLS OXIMETRY MLT: CPT

## 2023-07-31 PROCEDURE — 25000003 PHARM REV CODE 250: Performed by: INTERNAL MEDICINE

## 2023-07-31 PROCEDURE — A4216 STERILE WATER/SALINE, 10 ML: HCPCS | Performed by: INTERNAL MEDICINE

## 2023-07-31 PROCEDURE — 85025 COMPLETE CBC W/AUTO DIFF WBC: CPT

## 2023-07-31 PROCEDURE — 25000003 PHARM REV CODE 250

## 2023-07-31 PROCEDURE — 25000003 PHARM REV CODE 250: Performed by: STUDENT IN AN ORGANIZED HEALTH CARE EDUCATION/TRAINING PROGRAM

## 2023-07-31 RX ORDER — FERROUS SULFATE 325(65) MG
325 TABLET, DELAYED RELEASE (ENTERIC COATED) ORAL EVERY OTHER DAY
Qty: 45 TABLET | Refills: 1 | Status: SHIPPED | OUTPATIENT
Start: 2023-07-31 | End: 2023-09-18

## 2023-07-31 RX ORDER — AMLODIPINE BESYLATE 5 MG/1
5 TABLET ORAL DAILY
Qty: 90 TABLET | Refills: 0 | Status: SHIPPED | OUTPATIENT
Start: 2023-07-31 | End: 2023-07-31 | Stop reason: SDUPTHER

## 2023-07-31 RX ORDER — AMLODIPINE BESYLATE 5 MG/1
5 TABLET ORAL DAILY
Qty: 90 TABLET | Refills: 0 | Status: SHIPPED | OUTPATIENT
Start: 2023-07-31 | End: 2023-08-16

## 2023-07-31 RX ORDER — METHOCARBAMOL 750 MG/1
750 TABLET, FILM COATED ORAL NIGHTLY
Qty: 90 TABLET | Refills: 0 | Status: SHIPPED | OUTPATIENT
Start: 2023-07-31

## 2023-07-31 RX ORDER — FERROUS SULFATE 325(65) MG
325 TABLET, DELAYED RELEASE (ENTERIC COATED) ORAL EVERY OTHER DAY
Qty: 45 TABLET | Refills: 1 | Status: SHIPPED | OUTPATIENT
Start: 2023-07-31 | End: 2023-07-31 | Stop reason: SDUPTHER

## 2023-07-31 RX ADMIN — SODIUM CHLORIDE, PRESERVATIVE FREE 10 ML: 5 INJECTION INTRAVENOUS at 06:07

## 2023-07-31 RX ADMIN — LEVOFLOXACIN 750 MG: 750 TABLET, FILM COATED ORAL at 08:07

## 2023-07-31 RX ADMIN — APIXABAN 10 MG: 2.5 TABLET, FILM COATED ORAL at 08:07

## 2023-07-31 RX ADMIN — SODIUM CHLORIDE, PRESERVATIVE FREE 10 ML: 5 INJECTION INTRAVENOUS at 12:07

## 2023-07-31 RX ADMIN — DOXYCYCLINE HYCLATE 100 MG: 100 TABLET, COATED ORAL at 08:07

## 2023-07-31 RX ADMIN — PANTOPRAZOLE SODIUM 40 MG: 40 TABLET, DELAYED RELEASE ORAL at 08:07

## 2023-07-31 RX ADMIN — FERROUS SULFATE TAB 325 MG (65 MG ELEMENTAL FE) 1 EACH: 325 (65 FE) TAB at 08:07

## 2023-07-31 NOTE — DISCHARGE SUMMARY
[facility] Discharge Summary    Admitting Physician: Destiny Alba MD  Attending Physician: Lev Wall MD  Date of Admit: 2023  Date of Discharge: 2023    Condition: Stable  Outcome: Condition has improved and patient is now ready for discharge.  DISPOSITION: Home or Self Care      Discharge Diagnoses     Patient Active Problem List   Diagnosis    Abnormal uterine bleeding (AUB)    Anemia    Obesity, Class III, BMI 40-49.9 (morbid obesity)    Preoperative exam for gynecologic surgery    Fibroid    Bulky or enlarged uterus    Status post hysteroscopy    Breast cancer screening    Colon cancer screening    Well adult exam    Shortness of breath    DUB (dysfunctional uterine bleeding)    Dyspnea on exertion    Pulmonary embolism    Symptomatic anemia       Principal Problem:  Iron deficiency anemia due to chronic blood loss    Consultants and Procedures     Consultants:  IP CONSULT TO INTERNAL MEDICINE  IP CONSULT TO GYNECOLOGY  IP CONSULT TO MIDLINE TEAM  [facility]    Procedures:   * No surgery found *     Brief Admission History      Kely Sparks is a 52 y.o.  female with a history of abnormal uterine bleeding who presented to [facility] ED on 2023 with complaint of feeling short of breath.  Patient reported that she began feeling short of breath on 2023. She reported that she has a history of symptomatic anemia secondary to abnormal uterine bleeding. She reported her last menstrual cycle was on  with heavy flow and multiple clots.  She reported that her cycle completed at the beginning of July however she is been spotting since then.  She reported no abnormal uterine discharge, foul odors, rashes, hematuria, hematemesis, hematochezia, melena, bright red blood per rectum, bruises, hemoptysis at the time of admission but did reportongoing dyspnea on exertion since onset of symptoms, and reports bilateral lower extremity swelling. In the ED,  found to have H/H of 5.3/19.8.  Patient was given 3 units of blood and [facility] was consulted for admission.     Hospital Course with Pertinent Findings     Patient arrived in the ED on 7/23 with the complaint of SOB. Patient was found to have an H/H of 5.3/19.8 in the ED. She endorsed a history of uterine bleeding. She was given 3 units of blood with appropriate response. She was admitted to the inpatient medicine team for further care. On admission, patient was found to have an elevated d-dimer of 4.81. CT-PE was conducted which displayed pulmonary emboli within right lower lobe segmental and subsegmental branches as well as suspected within left lower lobe subsegmental branches. She was then started on heparin drip which was increased to high intensity heparin on 7/25 due to not clinically improving from a respiratory standpoint. Also on 7/25, patient was given 1 additional unit of blood due to H/H being 7.2/24.3. She displayed respiratory improvement with high-intensity heparin.  GYN was consulted for symptomatic anemia given patient's history of abnormal uterine bleeding on 7/28. Patient denied endometrial biopsy. GYN plans to follow-up outpatient with a scheduled appointment on 9/2023. Aygestin was discontinued. On 7/28, patient was transitioned to Eliquis for continuance of therapy outpatient. She remained asymptomatic through 7/31. Was discharged with instructions to complete Eliquis 10mg BID until 8/4. On 8/4 she will begin Eliquis 5mg BID. Patient also remained hypertensive throughout admission. Amlodipine 5mg prescribed.     Discharge physical exam:  Vitals  BP: (!) 154/84  Temp: 98.4 °F (36.9 °C)  Temp Source: Oral  Pulse: 79  Resp: 18  SpO2: 98 %  Height: 5' (152.4 cm)  Weight: 118.9 kg (262 lb 2 oz)    Physical Exam  Constitutional:       General: She is not in acute distress.     Appearance: Normal appearance. She is obese.   HENT:      Head: Normocephalic and atraumatic.   Eyes:       Extraocular Movements: Extraocular movements intact.      Pupils: Pupils are equal, round, and reactive to light.   Cardiovascular:      Rate and Rhythm: Normal rate and regular rhythm.      Pulses: Normal pulses.      Heart sounds: Normal heart sounds. No murmur heard.     No friction rub. No gallop.   Pulmonary:      Effort: Pulmonary effort is normal.      Breath sounds: Normal breath sounds. No wheezing, rhonchi or rales.   Abdominal:      General: Abdomen is flat.      Palpations: Abdomen is soft.      Tenderness: There is no abdominal tenderness. There is no guarding or rebound.   Musculoskeletal:         General: No swelling or tenderness. Normal range of motion.   Skin:     General: Skin is warm and dry.      Capillary Refill: Capillary refill takes less than 2 seconds.   Neurological:      General: No focal deficit present.      Mental Status: She is alert and oriented to person, place, and time.   Psychiatric:         Mood and Affect: Mood normal.         Behavior: Behavior normal.         Thought Content: Thought content normal.         Judgment: Judgment normal.       TIME SPENT ON DISCHARGE: 60 minutes    Discharge Medications        Medication List        START taking these medications      amLODIPine 5 MG tablet  Commonly known as: NORVASC  Take 1 tablet (5 mg total) by mouth once daily.     apixaban 5 mg Tab  Commonly known as: ELIQUIS  Take 1 tablet (5 mg total) by mouth 2 (two) times daily.            CHANGE how you take these medications      ferrous sulfate 325 (65 FE) MG EC tablet  Take 1 tablet (325 mg total) by mouth every other day.  What changed: when to take this            CONTINUE taking these medications      methocarbamoL 750 MG Tab  Commonly known as: ROBAXIN  Take 1 tablet (750 mg total) by mouth every evening.            STOP taking these medications      ibuprofen 600 MG tablet  Commonly known as: ADVIL,MOTRIN     medroxyPROGESTERone 10 MG tablet  Commonly known as: PROVERA      norethindrone 5 mg Tab  Commonly known as: AYGESTIN               Where to Get Your Medications        These medications were sent to [facility] - [address]  [address] [place] [address]      Phone: [phone]   amLODIPine 5 MG tablet  apixaban 5 mg Tab  ferrous sulfate 325 (65 FE) MG EC tablet  methocarbamoL 750 MG Tab         Discharge Information:     -Patient is to f/u with OB/GYN for abnormal uterine bleeding. Appointment has been made.   -Patient is to f/u with her PCP, Sera ARIAS, in 1-2 weeks post-discharge  -Patient is to take Eliquis 10mg BID until end of course. She is then to decrease dose to 5mg BID for DVT prophylaxis. Discussed this with the patient and she voiced understanding.    -Discussed case with OBGYN. They advised discontinuance of Provera and Aygestin for discharge. Discussed this with the patient and she voiced understanding.    -Patient prescribed Amlodipine 5mg qd for HTN.     -Discussed with the patient the importance of returning to the ED if bleeding worsens or if she develops new/worsening symptoms.     Ramiro Friedman DO HO-1  Internal Medicine

## 2023-08-02 LAB
BACTERIA BLD CULT: NORMAL
BACTERIA BLD CULT: NORMAL

## 2023-08-16 ENCOUNTER — OFFICE VISIT (OUTPATIENT)
Dept: INTERNAL MEDICINE | Facility: CLINIC | Age: 53
End: 2023-08-16
Payer: COMMERCIAL

## 2023-08-16 VITALS
WEIGHT: 264 LBS | HEIGHT: 61 IN | BODY MASS INDEX: 49.84 KG/M2 | TEMPERATURE: 99 F | RESPIRATION RATE: 18 BRPM | HEART RATE: 99 BPM | DIASTOLIC BLOOD PRESSURE: 70 MMHG | SYSTOLIC BLOOD PRESSURE: 134 MMHG

## 2023-08-16 DIAGNOSIS — Z12.31 BREAST CANCER SCREENING BY MAMMOGRAM: ICD-10-CM

## 2023-08-16 DIAGNOSIS — N93.9 ABNORMAL UTERINE BLEEDING (AUB): Primary | ICD-10-CM

## 2023-08-16 DIAGNOSIS — I26.99 PULMONARY EMBOLISM, UNSPECIFIED CHRONICITY, UNSPECIFIED PULMONARY EMBOLISM TYPE, UNSPECIFIED WHETHER ACUTE COR PULMONALE PRESENT: ICD-10-CM

## 2023-08-16 DIAGNOSIS — D50.0 IRON DEFICIENCY ANEMIA DUE TO CHRONIC BLOOD LOSS: ICD-10-CM

## 2023-08-16 DIAGNOSIS — Z13.6 SCREENING FOR HYPERTENSION: ICD-10-CM

## 2023-08-16 LAB
INR PPP: 1
INR PPP: 1.1
INR PPP: 1.1
PROTHROMBIN TIME: 12.9 SECONDS (ref 11.4–14)
PROTHROMBIN TIME: 13.5 SECONDS (ref 11.4–14)
PROTHROMBIN TIME: 14 SECONDS (ref 11.4–14)

## 2023-08-16 PROCEDURE — 99214 PR OFFICE/OUTPT VISIT, EST, LEVL IV, 30-39 MIN: ICD-10-PCS | Mod: S$PBB,,, | Performed by: NURSE PRACTITIONER

## 2023-08-16 PROCEDURE — 99214 OFFICE O/P EST MOD 30 MIN: CPT | Mod: S$PBB,,, | Performed by: NURSE PRACTITIONER

## 2023-08-16 PROCEDURE — 99213 OFFICE O/P EST LOW 20 MIN: CPT | Mod: PBBFAC | Performed by: NURSE PRACTITIONER

## 2023-08-16 NOTE — LETTER
August 16, 2023      [facility]  [facility] [address]  Phone: [phone]       Patient: Kely Sparks   YOB: 1970  Date of Visit: 08/16/2023    To Whom It May Concern:    Leta Sparks  was at [facility] on 08/16/2023. She may return to work/school on 8/21/2023 with no restrictions. If you have any questions or concerns, or if I can be of further assistance, please do not hesitate to contact me.    Sincerely,    JUANA Tavares

## 2023-08-16 NOTE — PROGRESS NOTES
Patient ID: [id number]     Chief Complaint: EMERGENCY ROOM -FOLLOW UP        HPI:     HPI      Kely Sparks is a 53 y.o. female here today hospital follow up for Anemia, PE. Pt was seen in ED 23 and diagnosed with PE and started on Eliquis. Pt was given blood transfusion due to H/H 5.3/19.8. PER discharge note on 23:  Kely Sparks is a 52 y.o.  female with a history of abnormal uterine bleeding who presented to [facility] ED on 2023 with complaint of feeling short of breath.  Patient reported that she began feeling short of breath on 2023. She reported that she has a history of symptomatic anemia secondary to abnormal uterine bleeding. She reported her last menstrual cycle was on  with heavy flow and multiple clots.  She reported that her cycle completed at the beginning of July however she is been spotting since then.  She reported no abnormal uterine discharge, foul odors, rashes, hematuria, hematemesis, hematochezia, melena, bright red blood per rectum, bruises, hemoptysis at the time of admission but did reportongoing dyspnea on exertion since onset of symptoms, and reports bilateral lower extremity swelling. In the ED, found to have H/H of 5.3/19.8.  Patient was given 3 units of blood and [facility] was consulted for admission.     Patient arrived in the ED on  with the complaint of SOB. Patient was found to have an H/H of 5.3/19.8 in the ED. She endorsed a history of uterine bleeding. She was given 3 units of blood with appropriate response. She was admitted to the inpatient medicine team for further care. On admission, patient was found to have an elevated d-dimer of 4.81. CT-PE was conducted which displayed pulmonary emboli within right lower lobe segmental and subsegmental branches as well as suspected within left lower lobe subsegmental branches. She was then started on heparin drip which was increased to high intensity heparin on   due to not clinically improving from a respiratory standpoint. Also on , patient was given 1 additional unit of blood due to H/H being 7.2/24.3. She displayed respiratory improvement with high-intensity heparin.  GYN was consulted for symptomatic anemia given patient's history of abnormal uterine bleeding on . Patient denied endometrial biopsy. GYN plans to follow-up outpatient with a scheduled appointment on 2023. Aygestin was discontinued. On , patient was transitioned to Eliquis for continuance of therapy outpatient. She remained asymptomatic through . Was discharged with instructions to complete Eliquis 10mg BID until . On  she will begin Eliquis 5mg BID. Patient also remained hypertensive throughout admission. Amlodipine 5mg prescribed.          ----------------------------  Abnormal Pap smear of cervix  Anemia  Chronic back pain  Chronic hypertension  Obesity, Class III, BMI 40-49.9 (morbid obesity)     Past Surgical History:   Procedure Laterality Date     SECTION      x2    CHOLECYSTECTOMY      HYSTEROSCOPY WITH DILATION AND CURETTAGE OF UTERUS N/A 2022    Procedure: HYSTEROSCOPY, WITH DILATION AND CURETTAGE OF UTERUS;  Surgeon: Susie Thibodeaux MD;  Location: [facility];  Service: OB/GYN;  Laterality: N/A;  **MYOSURE**    TUBAL LIGATION         Review of patient's allergies indicates:   Allergen Reactions    Shellfish containing products Hives and Itching       Current Outpatient Medications   Medication Instructions    apixaban (ELIQUIS) 5 mg, Oral, 2 times daily    ferrous sulfate 325 mg, Oral, Every other day    methocarbamoL (ROBAXIN) 750 mg, Oral, Nightly       Social History     Socioeconomic History    Marital status: Single   Tobacco Use    Smoking status: Never    Smokeless tobacco: Never   Substance and Sexual Activity    Alcohol use: Never    Drug use: Never    Sexual activity: Not Currently     Partners: Male     Social Determinants of Health     Financial  Resource Strain: Medium Risk (7/24/2023)    Overall Financial Resource Strain (CARDIA)     Difficulty of Paying Living Expenses: Somewhat hard   Food Insecurity: No Food Insecurity (7/24/2023)    Hunger Vital Sign     Worried About Running Out of Food in the Last Year: Never true     Ran Out of Food in the Last Year: Never true   Transportation Needs: No Transportation Needs (7/24/2023)    PRAPARE - Transportation     Lack of Transportation (Medical): No     Lack of Transportation (Non-Medical): No   Physical Activity: Inactive (7/24/2023)    Exercise Vital Sign     Days of Exercise per Week: 0 days     Minutes of Exercise per Session: 0 min   Stress: Stress Concern Present (7/24/2023)    [employer] [place] [employer] - Occupational Stress Questionnaire     Feeling of Stress : To some extent   Social Connections: Moderately Isolated (7/24/2023)    Social Connection and Isolation Panel [NHANES]     Frequency of Communication with Friends and Family: More than three times a week     Frequency of Social Gatherings with Friends and Family: More than three times a week     Attends [employer] Services: More than 4 times per year     Active Member of Clubs or Organizations: No     Attends Club or Organization Meetings: Never     Marital Status: Never    Housing Stability: Low Risk  (7/24/2023)    Housing Stability Vital Sign     Unable to Pay for Housing in the Last Year: No     Number of Places Lived in the Last Year: 1     Unstable Housing in the Last Year: No        Family History   Problem Relation Age of Onset    Hypertension Mother     No Known Problems Father         Patient Care Team:  Sera Villarreal FNP as PCP - General (Family Medicine)     Subjective:     Review of Systems     See HPI for details    Constitutional: Denies Change in appetite. Denies Chills. Denies Fever. Denies Night sweats.  Eye: Denies Blurred vision. Denies Discharge. Denies Eye pain.  ENT: Denies Decreased hearing.  "Denies Sore throat. Denies Swollen glands.  Respiratory: Denies Cough. Denies Shortness of breath. Denies Shortness of breath with exertion. Denies Wheezing.  Cardiovascular: DeniesChest pain at rest. Denies Chest pain with exertion. Denies Irregular heartbeat. Denies Palpitations. Denies Edema.  Gastrointestinal: Denies Abdominal pain. DeniesDiarrhea. Denies Nausea. Denies Vomiting. Denies Hematemesis or Hematochezia.  Genitourinary: Denies Dysuria. Denies Urinary frequency. Denies Urinary urgency. Denies Blood in urine.  Endocrine: Denies Cold intolerance. Denies Excessive thirst. Denies Heat intolerance. Denies Weight loss. Denies Weight gain.  Musculoskeletal: Denies Painful joints. Denies Weakness.  Integumentary: Denies Rash. Denies Itching. Denies Dry skin.  Neurologic: Denies Dizziness. Denies Fainting. Denies Headache.  Psychiatric: Denies Depression. Denies Anxiety. Denies Suicidal/Homicidal ideations.    All Other ROS: Negative except as stated in HPI.       Objective:     Visit Vitals  /70 (BP Location: Left arm, Patient Position: Sitting, BP Method: Large (Automatic))   Pulse 99   Temp 98.6 °F (37 °C) (Oral)   Resp 18   Ht 5' 1" (1.549 m)   Wt 119.7 kg (264 lb)   BMI 49.88 kg/m²       Physical Exam    General: Alert and oriented, No acute distress.  Head: Normocephalic, Atraumatic.  Eye: Pupils are equal, round and reactive to light, Extraocular movements are intact, Sclera non-icteric.  Ears/Nose/Throat: Normal, Mucosa moist,Clear.  Neck/Thyroid: Supple, Non-tender, No carotid bruit, No lymphadenopathy, No JVD, Full range of motion.  Respiratory: Clear to auscultation bilaterally; No wheezes, rales or rhonchi,Non-labored respirations, Symmetrical chest wall expansion.  Cardiovascular: Regular rate and rhythm, S1/S2 normal, No murmurs, rubs or gallops.  Gastrointestinal: Soft, Non-tender, Non-distended, Normal bowel sounds, No palpable organomegaly.  Musculoskeletal: Normal range of " motion.  Integumentary: Warm, Dry, Intact, No suspicious lesions or rashes.  Extremities: No clubbing, cyanosis or edema  Neurologic: No focal deficits, Cranial Nerves II-XII are grossly intact, Motor strength normal upper and lower extremities, Sensory exam intact.  Psychiatric: Normal interaction, Coherent speech, Euthymic mood, Appropriate affect       Labs Reviewed:     Chemistry:  Lab Results   Component Value Date     07/30/2023    K 4.9 07/30/2023    CHLORIDE 108 (H) 07/30/2023    BUN 9.3 (L) 07/30/2023    CREATININE 1.19 (H) 07/30/2023    EGFRNORACEVR 55 07/30/2023    GLUCOSE 85 07/30/2023    CALCIUM 9.0 07/30/2023    ALKPHOS 47 07/24/2023    LABPROT 7.7 07/24/2023    ALBUMIN 3.3 (L) 07/24/2023    AST 44 (H) 07/24/2023    ALT 37 07/24/2023    MG 1.90 07/24/2023    PHOS 2.7 07/24/2023    [id number] 24.4 (L) 01/21/2023        Lab Results   Component Value Date    HGBA1C 5.2 10/19/2022        Hematology:  Lab Results   Component Value Date    WBC 16.68 (H) 07/31/2023    HGB 8.5 (L) 07/31/2023    HGB 8.4 (L) 07/31/2023    HCT 29.7 (L) 07/31/2023    HCT 30.5 (L) 07/31/2023     (H) 07/31/2023       Lipid Panel:  Lab Results   Component Value Date    CHOL 139 10/19/2022    HDL 40 10/19/2022    LDL 93.00 10/19/2022    TRIG 29 (L) 10/19/2022    TOTALCHOLEST 3 10/19/2022        Urine:  Lab Results   Component Value Date    COLORUA Colorless (A) 07/28/2023    APPEARANCEUA Clear 07/28/2023    SGUA 1.006 07/28/2023    PHUA 6.0 07/28/2023    PROTEINUA Negative 07/28/2023    GLUCOSEUA Normal 07/28/2023    KETONESUA Negative 07/28/2023    BLOODUA 3+ (A) 07/28/2023    NITRITESUA Negative 07/28/2023    LEUKOCYTESUR 25 (A) 07/28/2023    RBCUA 21-50 (A) 07/28/2023    WBCUA 0-5 07/28/2023    BACTERIA None Seen 07/28/2023    SQEPUA Trace (A) 07/28/2023    HYALINECASTS None Seen 07/28/2023        Assessment:       ICD-10-CM ICD-9-CM   1. Abnormal uterine bleeding (AUB)  N93.9 626.9   2. Anemia  D50.0 280.0   3.  Pulmonary embolism, unspecified chronicity, unspecified pulmonary embolism type, unspecified whether acute cor pulmonale present  I26.99 415.19   4. Screening for hypertension  Z13.6 V81.1   5. Breast cancer screening by mammogram  Z12.31 V76.12        Plan:     1. Abnormal uterine bleeding (AUB)  Pt was seen in ER and given 3 units of blood due to H/H 5.3/19.8. Pt was seen by GYN and informed of need for EMB however pt refused. Pt has GYN cl appt scheduled 9-27-23 for further mgmt- keep appt.     2. Anemia  Pt was given 3 units of blood in ER 7-23-23. Pt has f/u appt for AUB in GYN cl on 9-27-23- keep appt. Cont Ferrous Sulfate as prescribed every other day. Will repeat CBC and iron level in 1 month.     3. Pulmonary embolism, unspecified chronicity, unspecified pulmonary embolism type, unspecified whether acute cor pulmonale present  Pt was diagnosed with PE unprovoked in ED and started on Eliquis- pt currently taking Eliquis 5 mg 1 tab po BID. Discussed with Dr Junior. Informed pt needs to be further worked up for Endometrial CA- informed pt refused EMB in ED. Dr Junior informed until CA is determined she will need to stay on Eliquis. Informed pt she will need to stay on Eliquis until work up completed by GYN. Pt verbalized understanding. Pt anticipating possible hysterectomy. ER precautions given.    4. Screening for hypertension  BP controlled. Low fat low salt diet and exercise encouraged. Pt refused Amlodipine as prescribed during inpatient stay. Will monitor.     5. Breast cancer screening by mammogram  MMG with CLYDE in 1 month- due 9-14-23.      6. HTN  Pt was prescribed Amlodipine due to HTN during admission however pt refused to start medication. BP controlled today. Encouraged to continue Low fat low salt diet and exercise.     7. Well adult  Labs in 1 month. Fasting labs in 3 months. MMG with CLYDE in 1 month. Keep GYN cl appt as scheduled 9-27-23. Cologuard has been ordered multiple visits however pt  never received testing kit. Phone number given to pt to call [employer] to get kit shipped to her home- informed to complete as ordered once collected.     Follow up in about 1 month (around 9/16/2023) for Telemed for CBC lab results.. In addition to their scheduled follow up, the patient has also been instructed to follow up on as needed basis.     Pt states when she was discharged she was informed she can return to full activity. Pt needing letter for her job stating her can return to work without restrictions. 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 Letter provided to pt to return to work without restrictions.   Future Appointments   Date Time Provider Department Center   9/27/2023  1:15 PM [facility] GYN [facility] JUANA [place]

## 2023-09-04 PROBLEM — Z00.00 WELL ADULT EXAM: Status: RESOLVED | Noted: 2022-10-26 | Resolved: 2023-09-04

## 2023-09-18 ENCOUNTER — OFFICE VISIT (OUTPATIENT)
Dept: INTERNAL MEDICINE | Facility: CLINIC | Age: 53
End: 2023-09-18
Payer: COMMERCIAL

## 2023-09-18 VITALS
HEIGHT: 61 IN | SYSTOLIC BLOOD PRESSURE: 138 MMHG | HEART RATE: 84 BPM | BODY MASS INDEX: 46.82 KG/M2 | WEIGHT: 248 LBS | DIASTOLIC BLOOD PRESSURE: 85 MMHG | RESPIRATION RATE: 18 BRPM | TEMPERATURE: 98 F

## 2023-09-18 DIAGNOSIS — I10 PRIMARY HYPERTENSION: ICD-10-CM

## 2023-09-18 DIAGNOSIS — Z12.11 COLON CANCER SCREENING: ICD-10-CM

## 2023-09-18 DIAGNOSIS — D50.0 IRON DEFICIENCY ANEMIA DUE TO CHRONIC BLOOD LOSS: ICD-10-CM

## 2023-09-18 DIAGNOSIS — I26.99 PULMONARY EMBOLISM, UNSPECIFIED CHRONICITY, UNSPECIFIED PULMONARY EMBOLISM TYPE, UNSPECIFIED WHETHER ACUTE COR PULMONALE PRESENT: ICD-10-CM

## 2023-09-18 DIAGNOSIS — R31.9 HEMATURIA, UNSPECIFIED TYPE: ICD-10-CM

## 2023-09-18 DIAGNOSIS — N93.9 ABNORMAL UTERINE BLEEDING (AUB): Primary | ICD-10-CM

## 2023-09-18 DIAGNOSIS — Z12.31 ENCOUNTER FOR SCREENING MAMMOGRAM FOR MALIGNANT NEOPLASM OF BREAST: ICD-10-CM

## 2023-09-18 DIAGNOSIS — Z00.00 WELL ADULT EXAM: ICD-10-CM

## 2023-09-18 DIAGNOSIS — Z13.6 SCREENING FOR HYPERTENSION: ICD-10-CM

## 2023-09-18 PROCEDURE — 99214 PR OFFICE/OUTPT VISIT, EST, LEVL IV, 30-39 MIN: ICD-10-PCS | Mod: S$PBB,,, | Performed by: NURSE PRACTITIONER

## 2023-09-18 PROCEDURE — 99214 OFFICE O/P EST MOD 30 MIN: CPT | Mod: S$PBB,,, | Performed by: NURSE PRACTITIONER

## 2023-09-18 PROCEDURE — 99213 OFFICE O/P EST LOW 20 MIN: CPT | Mod: PBBFAC | Performed by: NURSE PRACTITIONER

## 2023-09-18 RX ORDER — FERROUS SULFATE 325(65) MG
325 TABLET, DELAYED RELEASE (ENTERIC COATED) ORAL DAILY
Qty: 90 TABLET | Refills: 1 | Status: SHIPPED | OUTPATIENT
Start: 2023-09-18 | End: 2024-02-23 | Stop reason: SDUPTHER

## 2023-09-18 NOTE — PROGRESS NOTES
Patient ID: [id number]     Chief Complaint: LAB RESULTS        HPI:     HPI      Kely Sparks is a 53 y.o. female here today for a follow up.         ----------------------------  Abnormal Pap smear of cervix  Anemia  Chronic back pain  Chronic hypertension  Obesity, Class III, BMI 40-49.9 (morbid obesity)     Past Surgical History:   Procedure Laterality Date     SECTION      x2    CHOLECYSTECTOMY      HYSTEROSCOPY WITH DILATION AND CURETTAGE OF UTERUS N/A 2022    Procedure: HYSTEROSCOPY, WITH DILATION AND CURETTAGE OF UTERUS;  Surgeon: Susie Thibodeaux MD;  Location: [facility];  Service: OB/GYN;  Laterality: N/A;  **MYOSURE**    TUBAL LIGATION         Review of patient's allergies indicates:   Allergen Reactions    Shellfish containing products Hives and Itching       Current Outpatient Medications   Medication Instructions    apixaban (ELIQUIS) 5 mg, Oral, 2 times daily    ferrous sulfate 325 mg, Oral, Every other day    methocarbamoL (ROBAXIN) 750 mg, Oral, Nightly       Social History     Socioeconomic History    Marital status: Single   Tobacco Use    Smoking status: Never    Smokeless tobacco: Never   Substance and Sexual Activity    Alcohol use: Never    Drug use: Never    Sexual activity: Not Currently     Partners: Male     Social Determinants of Health     Financial Resource Strain: Medium Risk (2023)    Overall Financial Resource Strain (CARDIA)     Difficulty of Paying Living Expenses: Somewhat hard   Food Insecurity: No Food Insecurity (2023)    Hunger Vital Sign     Worried About Running Out of Food in the Last Year: Never true     Ran Out of Food in the Last Year: Never true   Transportation Needs: No Transportation Needs (2023)    PRAPARE - Transportation     Lack of Transportation (Medical): No     Lack of Transportation (Non-Medical): No   Physical Activity: Inactive (2023)    Exercise Vital Sign     Days of Exercise per Week: 0 days     Minutes of  Exercise per Session: 0 min   Stress: Stress Concern Present (7/24/2023)    [employer] - Occupational Stress Questionnaire     Feeling of Stress : To some extent   Social Connections: Moderately Isolated (7/24/2023)    Social Connection and Isolation Panel [NHANES]     Frequency of Communication with Friends and Family: More than three times a week     Frequency of Social Gatherings with Friends and Family: More than three times a week     Attends [employer] Services: More than 4 times per year     Active Member of Clubs or Organizations: No     Attends Club or Organization Meetings: Never     Marital Status: Never    Housing Stability: Low Risk  (7/24/2023)    Housing Stability Vital Sign     Unable to Pay for Housing in the Last Year: No     Number of Places Lived in the Last Year: 1     Unstable Housing in the Last Year: No        Family History   Problem Relation Age of Onset    Hypertension Mother     No Known Problems Father         Patient Care Team:  Sera Villarreal FNP as PCP - General (Family Medicine)     Subjective:     Review of Systems     See HPI for details    Constitutional: Denies Change in appetite. Denies Chills. Denies Fever. Denies Night sweats.  Eye: Denies Blurred vision. Denies Discharge. Denies Eye pain.  ENT: Denies Decreased hearing. Denies Sore throat. Denies Swollen glands.  Respiratory: Denies Cough. Denies Shortness of breath. Denies Shortness of breath with exertion. Denies Wheezing.  Cardiovascular: DeniesChest pain at rest. Denies Chest pain with exertion. Denies Irregular heartbeat. Denies Palpitations. Denies Edema.  Gastrointestinal: Denies Abdominal pain. DeniesDiarrhea. Denies Nausea. Denies Vomiting. Denies Hematemesis or Hematochezia.  Genitourinary: Denies Dysuria. Denies Urinary frequency. Denies Urinary urgency. Denies Blood in urine.  Endocrine: Denies Cold intolerance. Denies Excessive thirst. Denies Heat intolerance. Denies  "Weight loss. Denies Weight gain.  Musculoskeletal: Denies Painful joints. Denies Weakness.  Integumentary: Denies Rash. Denies Itching. Denies Dry skin.  Neurologic: Denies Dizziness. Denies Fainting. Denies Headache.  Psychiatric: Denies Depression. Denies Anxiety. Denies Suicidal/Homicidal ideations.    All Other ROS: Negative except as stated in HPI.       Objective:     Visit Vitals  /85 (BP Location: Right arm, Patient Position: Sitting, BP Method: Large (Automatic))   Pulse 84   Temp 98.3 °F (36.8 °C) (Oral)   Resp 18   Ht 5' 1" (1.549 m)   Wt 112.5 kg (248 lb)   BMI 46.86 kg/m²       Physical Exam    General: Alert and oriented, No acute distress.  Head: Normocephalic, Atraumatic.  Eye: Pupils are equal, round and reactive to light, Extraocular movements are intact, Sclera non-icteric.  Ears/Nose/Throat: Normal, Mucosa moist,Clear.  Neck/Thyroid: Supple, Non-tender, No carotid bruit, No lymphadenopathy, No JVD, Full range of motion.  Respiratory: Clear to auscultation bilaterally; No wheezes, rales or rhonchi,Non-labored respirations, Symmetrical chest wall expansion.  Cardiovascular: Regular rate and rhythm, S1/S2 normal, No murmurs, rubs or gallops.  Gastrointestinal: Soft, Non-tender, Non-distended, Normal bowel sounds, No palpable organomegaly.  Musculoskeletal: Normal range of motion.  Integumentary: Warm, Dry, Intact, No suspicious lesions or rashes.  Extremities: No clubbing, cyanosis or edema  Neurologic: No focal deficits, Cranial Nerves II-XII are grossly intact, Motor strength normal upper and lower extremities, Sensory exam intact.  Psychiatric: Normal interaction, Coherent speech, Euthymic mood, Appropriate affect       Labs Reviewed:     Chemistry:  Lab Results   Component Value Date     07/30/2023    K 4.9 07/30/2023    CHLORIDE 108 (H) 07/30/2023    BUN 9.3 (L) 07/30/2023    CREATININE 1.19 (H) 07/30/2023    EGFRNORACEVR 55 07/30/2023    GLUCOSE 85 07/30/2023    CALCIUM 9.0 " 07/30/2023    ALKPHOS 47 07/24/2023    LABPROT 7.7 07/24/2023    ALBUMIN 3.3 (L) 07/24/2023    AST 44 (H) 07/24/2023    ALT 37 07/24/2023    MG 1.90 07/24/2023    PHOS 2.7 07/24/2023    [id number] 24.4 (L) 01/21/2023        Lab Results   Component Value Date    HGBA1C 5.2 10/19/2022        Hematology:  Lab Results   Component Value Date    WBC 8.71 09/18/2023    HGB 8.0 (L) 09/18/2023    HCT 28.5 (L) 09/18/2023     09/18/2023       Lipid Panel:  Lab Results   Component Value Date    CHOL 139 10/19/2022    HDL 40 10/19/2022    LDL 93.00 10/19/2022    TRIG 29 (L) 10/19/2022    TOTALCHOLEST 3 10/19/2022        Urine:  Lab Results   Component Value Date    COLORUA Colorless (A) 07/28/2023    APPEARANCEUA Clear 07/28/2023    SGUA 1.006 07/28/2023    PHUA 6.0 07/28/2023    PROTEINUA Negative 07/28/2023    GLUCOSEUA Normal 07/28/2023    KETONESUA Negative 07/28/2023    BLOODUA 3+ (A) 07/28/2023    NITRITESUA Negative 07/28/2023    LEUKOCYTESUR 25 (A) 07/28/2023    RBCUA 21-50 (A) 07/28/2023    WBCUA 0-5 07/28/2023    BACTERIA None Seen 07/28/2023    SQEPUA Trace (A) 07/28/2023    HYALINECASTS None Seen 07/28/2023        Assessment:       ICD-10-CM ICD-9-CM   1. Abnormal uterine bleeding (AUB)  N93.9 626.9   2. Anemia  D50.0 280.0   3. Pulmonary embolism, unspecified chronicity, unspecified pulmonary embolism type, unspecified whether acute cor pulmonale present  I26.99 415.19   4. Screening for hypertension  Z13.6 V81.1   5. Encounter for screening mammogram for malignant neoplasm of breast  Z12.31 V76.12   6. Primary hypertension  I10 401.9   7. Well adult exam  Z00.00 V70.0   8. Hematuria, unspecified type  R31.9 599.70        Plan:     1. Abnormal uterine bleeding (AUB)  CBC iron profile in 3 months. Keep GYN cl appts as scheduled. Informed to increase Ferrous Sulfate 325 mg from 1 every other day to once daily due to Hgb 8.0, Iron 14.     2. Anemia  CBC iron profile in 3 months. Informed to increase Ferrous  Sulfate 325 mg from 1 every other day to once daily due to Hgb 8.0, Iron 14. Pt denies fatigue.    3. Pulmonary embolism, unspecified chronicity, unspecified pulmonary embolism type, unspecified whether acute cor pulmonale present  Pt was diagnosed with PE unprovoked in ED and started on Eliquis- pt currently taking Eliquis 5 mg 1 tab po BID. Discussed with Dr Junior. Informed pt needs to be further worked up for Endometrial CA- informed pt refused EMB in ED. Dr Junior informed until CA is determined she will need to stay on Eliquis. Informed pt she will need to stay on Eliquis until work up completed by GYN. Pt verbalized understanding. Pt anticipating possible hysterectomy. ER precautions given.    4. Screening for hypertension  BP controlled. Low fat low salt diet and exercise encouraged. Pt refused Amlodipine as prescribed during inpatient stay. Will monitor.     5. Encounter for screening mammogram for malignant neoplasm of breast  Keep MMG appt as scheduled.     6. Primary hypertension  Pt was prescribed Amlodipine due to HTN during admission however pt refused to start medication. BP controlled today. Encouraged to continue Low fat low salt diet and exercise.    7. Well adult exam  Labs in 3 months. Keep MMG appt as scheduled. Keep GYN cl appt as scheduled 9-27-23. Cologuard has been ordered multiple visits however pt never received testing kit. Phone number given to pt to call [employer] to get kit shipped to her home- informed to complete as ordered once collected.     8. Hematuria, unspecified type  UA C&S cyto in 3 months.          Follow up in about 3 months (around 12/18/2023) for with labs 1 week prior to appt. . In addition to their scheduled follow up, the patient has also been instructed to follow up on as needed basis.     Future Appointments   Date Time Provider Department Center   9/19/2023  7:00 AM [facility] MAMMO2 [facility] MAMMO [place]    11/6/2023  9:45 AM [facility], [facility] GYN [facility] GYN  Ronald Villarreal, P

## 2023-09-19 ENCOUNTER — HOSPITAL ENCOUNTER (OUTPATIENT)
Dept: RADIOLOGY | Facility: HOSPITAL | Age: 53
Discharge: HOME | End: 2023-09-19
Attending: NURSE PRACTITIONER
Payer: COMMERCIAL

## 2023-09-19 DIAGNOSIS — Z12.31 BREAST CANCER SCREENING BY MAMMOGRAM: ICD-10-CM

## 2023-09-19 LAB
OB IA INT NEG CNTRL (OHS): NEGATIVE
OB IA INT POS CNTRL (OHS): POSITIVE
OCCULT BLD IA (OHS): NEGATIVE

## 2023-09-19 PROCEDURE — 77067 SCR MAMMO BI INCL CAD: CPT | Mod: 26,,, | Performed by: RADIOLOGY

## 2023-09-19 PROCEDURE — 77063 MAMMO DIGITAL SCREENING BILAT WITH TOMO: ICD-10-PCS | Mod: 26,,, | Performed by: RADIOLOGY

## 2023-09-19 PROCEDURE — 77067 MAMMO DIGITAL SCREENING BILAT WITH TOMO: ICD-10-PCS | Mod: 26,,, | Performed by: RADIOLOGY

## 2023-09-19 PROCEDURE — 77067 SCR MAMMO BI INCL CAD: CPT | Mod: TC

## 2023-09-19 PROCEDURE — 77063 BREAST TOMOSYNTHESIS BI: CPT | Mod: 26,,, | Performed by: RADIOLOGY

## 2023-10-23 ENCOUNTER — OFFICE VISIT (OUTPATIENT)
Dept: GYNECOLOGY | Facility: CLINIC | Age: 53
End: 2023-10-23
Payer: COMMERCIAL

## 2023-10-23 VITALS
OXYGEN SATURATION: 100 % | TEMPERATURE: 98 F | WEIGHT: 250.38 LBS | BODY MASS INDEX: 47.27 KG/M2 | SYSTOLIC BLOOD PRESSURE: 124 MMHG | HEART RATE: 95 BPM | HEIGHT: 61 IN | DIASTOLIC BLOOD PRESSURE: 84 MMHG

## 2023-10-23 DIAGNOSIS — D50.0 IRON DEFICIENCY ANEMIA DUE TO CHRONIC BLOOD LOSS: ICD-10-CM

## 2023-10-23 DIAGNOSIS — N93.9 ABNORMAL UTERINE BLEEDING (AUB): Primary | ICD-10-CM

## 2023-10-23 PROCEDURE — 99213 OFFICE O/P EST LOW 20 MIN: CPT | Mod: PBBFAC

## 2023-10-23 NOTE — PROGRESS NOTES
[facility]IC NOTE  [facility]  [address]  [place] [address]  Phone: [phone]  Fax: [phone]    Subjective:     Kely Sparks is a 53 y.o.  who presents for AUB-L after recent admission requiring transfusion of 4 U pRBCs. Initial H/H was 5.3/19.8. Heavy menstrual bleeding started in  but has been irregular for as long as she can remember. She has tried Mirena, Provera, and Aygestin without much relief. Aygestin was discontinued at the end of 2023 due to PE currently being treated with Eliquis. Since that time she has been intermittently spotting a few times per week without episodes of heavy bleeding. She began bleeding this morning on her way to this appointment. She denies symptoms of anemia.    Of note, she was first evaluated for AUB in  with EMB showing focal hyperplasia with atypia, HDC notable for secretory endometrium, NEM. EMB in  was also benign. She has required 3 transfusions over the past year. She was previously reluctant to undergo hysterectomy but has now decided that she is ready.     OBHx:  OB History    Para Term  AB Living   2 2 2     2   SAB IAB Ectopic Multiple Live Births           2      # Outcome Date GA Lbr Kevin/2nd Weight Sex Delivery Anes PTL Lv   2 Term     M CS-LTranv  N LESLIE   1 Term     F CS-LTranv  N LESLIE      Obstetric Comments   BB 8lb 4oz   Triad:        GynHx:  Triad:   LMP: 2022; has had intermittent spotting since  Contraception: BTL  Perimenopausal; FSH 40  Remote hx trichomonas and chlamydia, s/p tx  Remote hx abnorma pap; denies biopsy or excisional procedure     MedHx:   Past Medical History:   Diagnosis Date    Abnormal Pap smear of cervix     Anemia     Chronic back pain     Chronic hypertension     Obesity, Class III, BMI 40-49.9 (morbid obesity)        SurgHx:   Past Surgical History:   Procedure Laterality Date     SECTION      x2    CHOLECYSTECTOMY      HYSTEROSCOPY  "WITH DILATION AND CURETTAGE OF UTERUS N/A 06/09/2022    Procedure: HYSTEROSCOPY, WITH DILATION AND CURETTAGE OF UTERUS;  Surgeon: Susie Thibodeaux MD;  Location: [facility];  Service: OB/GYN;  Laterality: N/A;  **MYOSURE**    TUBAL LIGATION         Medications:   Current Outpatient Medications   Medication Instructions    apixaban (ELIQUIS) 5 mg, Oral, 2 times daily    ferrous sulfate 325 mg, Oral, Daily, Take with Vit C 500 mg with iron med to increase absorption.    methocarbamoL (ROBAXIN) 750 mg, Oral, Nightly       FM Hx:   Family History   Problem Relation Age of Onset    Hypertension Mother     No Known Problems Father       Denies hx of ovarian, uterine, endometrial, or colon cancer.    Social Hx:   Social History     Tobacco Use    Smoking status: Never    Smokeless tobacco: Never   Substance Use Topics    Alcohol use: Never    Drug use: Never      Denies tobacco, alcohol and illicit drug usage.    Review of Systems  Denies SOB, chest pain, palpitations, dizziness, HA, lower extremity pain.    Objective:     Vitals:    10/23/23 1038   BP: 124/84   BP Location: Left arm   Pulse: 95   Temp: 98.3 °F (36.8 °C)   SpO2: 100%   Weight: 113.6 kg (250 lb 6.4 oz)   Height: 5' 1" (1.549 m)     Body mass index is 47.31 kg/m².    Physical Exam:  Gen: Well-nourished, well-developed female appearing stated age. Alert, cooperative, in no acute distress.  CV: RRR, no murmurs  Chest: CTAB  Abdomen: Soft, non-tender, no masses.  Extrem: Bilateral woody changes consistent with chronic venous stasis encasing lower leg to mid calves, edematous around ankles bilaterally, non-tender calves.  External genitalia: Normal female genetalia without lesion, discharge or tenderness. I  Speculum Exam: Deferred per patient preference  Bimanual Exam: Deferred per patient preference    Labs  No results found for this or any previous visit (from the past 24 hour(s)).    Imaging  No images are attached to the encounter.    Paps: 6/1/22, NIL and HPV " negative  Mammogram: BIR-RADS 1 on 23  Colonoscopy: never completed, Cologuard ordered multiple times by PCP    Assessment:     53 y.o.  here for AUB-L requiring 3 admission for transfusions over the past year.    1. Abnormal uterine bleeding (AUB)  CBC Auto Differential    Comprehensive Metabolic Panel      2. Iron deficiency anemia due to chronic blood loss            Plan:     Problem List Items Addressed This Visit          Renal/    Abnormal uterine bleeding (AUB) - Primary    Relevant Orders    CBC Auto Differential    Comprehensive Metabolic Panel       Oncology    Anemia     - Plan for TLH   - Hold Eliquis 2 days prior   - CBC, CMP today  - Continue PO iron supplementation  - Consider Faraheme prior to procedure  - Previous imaging reviewed including TVUS    Patient and plan were discussed with Dr. Susie Thibodeaux.    Adam Crowder MD  [facility] PGY-II

## 2023-10-30 PROBLEM — I26.99 PULMONARY EMBOLISM: Status: RESOLVED | Noted: 2023-07-24 | Resolved: 2023-10-30

## 2023-10-31 ENCOUNTER — TELEPHONE (OUTPATIENT)
Dept: GYNECOLOGY | Facility: CLINIC | Age: 53
End: 2023-10-31
Payer: COMMERCIAL

## 2023-10-31 DIAGNOSIS — D21.9 FIBROIDS: Primary | ICD-10-CM

## 2023-10-31 NOTE — TELEPHONE ENCOUNTER
Spoke with pt regarding surgery scheduling for SULEMAN, pt amenable to Tuesday 12/5. Case request submitted. Will request pre-op visit.     Venus Overton MD   [facility] PGY-3

## 2023-11-19 NOTE — H&P (VIEW-ONLY)
----- Message from Niya Lunsford sent at 4/19/2017  9:50 AM CDT -----  Contact: [facility] called regarding scheduling the patient a 2 wk post op appt from today. Please contact [phone] ([email or web address])      [facility] Preoperative Visit History and Physical    HPI:   53 y.o.  with a history of AUB-L     Heavy menstrual bleeding started in  but has been irregular for as long as she can remember. She has tried Mirena, Provera, and Aygestin without much relief. She was admitted to the hospital from - initially in the setting of symptomatic anemia with a H/H of 5.3/19.8 for which she received 4u PRBCs, however she was found to also have a PE during the admission. Aygestin was discontinued at this time, pt has been on Eliquis since. Since that time she has been intermittently spotting a few times per week without episodes of heavy bleeding.      Of note, she was first evaluated for AUB in  with EMB showing focal hyperplasia with atypia. Hysteroscopy in in  was also benign. She has required a total of 3 transfusions over the past year. She was previously reluctant to undergo hysterectomy but has now decided that she is ready. No additional complaints today.      PMH:  Past Medical History:   Diagnosis Date    Abnormal Pap smear of cervix     Anemia     Chronic back pain     Chronic hypertension     Obesity, Class III, BMI 40-49.9 (morbid obesity)     Other pulmonary embolism without acute cor pulmonale     history of PE in      SurgHx:  Past Surgical History:   Procedure Laterality Date     SECTION      x2    CHOLECYSTECTOMY      HYSTEROSCOPY WITH DILATION AND CURETTAGE OF UTERUS N/A 2022    Procedure: HYSTEROSCOPY, WITH DILATION AND CURETTAGE OF UTERUS;  Surgeon: Susie Thibodeaux MD;  Location: [facility];  Service: OB/GYN;  Laterality: N/A;  **MYOSURE**    TUBAL LIGATION         ObHx:  OB History    Para Term  AB Living   2 2 2     2   SAB IAB Ectopic Multiple Live Births           2      # Outcome Date GA Lbr Kevin/2nd Weight Sex Delivery Anes PTL Lv   2 Term     M CS-LTranv  N LESLIE   1 Term     F CS-LTranv  N LESLIE      Obstetric Comments   BB 8lb 4oz    Triad: 13/I/5-7       GynHx:  13/I/5-7  Contraception: S/p BTL     Remote hx of Chlamydia   06/22: NILM, HR HPV-    Remote hx of abnormal pap smear     FamHx:  Family History   Problem Relation Age of Onset    Hypertension Mother     No Known Problems Father      Denies history of breast, ovarian, endometrial, colon cancers.    SocialHx:  Social History     Socioeconomic History    Marital status: Single   Tobacco Use    Smoking status: Never    Smokeless tobacco: Never   Substance and Sexual Activity    Alcohol use: Never    Drug use: Never    Sexual activity: Not Currently     Partners: Male     Social Determinants of Health     Financial Resource Strain: Medium Risk (7/24/2023)    Overall Financial Resource Strain (CARDIA)     Difficulty of Paying Living Expenses: Somewhat hard   Food Insecurity: No Food Insecurity (7/24/2023)    Hunger Vital Sign     Worried About Running Out of Food in the Last Year: Never true     Ran Out of Food in the Last Year: Never true   Transportation Needs: No Transportation Needs (7/24/2023)    PRAPARE - Transportation     Lack of Transportation (Medical): No     Lack of Transportation (Non-Medical): No   Physical Activity: Inactive (7/24/2023)    Exercise Vital Sign     Days of Exercise per Week: 0 days     Minutes of Exercise per Session: 0 min   Stress: Stress Concern Present (7/24/2023)    [place] [employer] - Occupational Stress Questionnaire     Feeling of Stress : To some extent   Social Connections: Moderately Isolated (7/24/2023)    Social Connection and Isolation Panel [NHANES]     Frequency of Communication with Friends and Family: More than three times a week     Frequency of Social Gatherings with Friends and Family: More than three times a week     Attends Hindu Services: More than 4 times per year     Active Member of Clubs or Organizations: No     Attends Club or Organization Meetings: Never     Marital Status: Never    Housing  "Stability: Low Risk  (7/24/2023)    Housing Stability Vital Sign     Unable to Pay for Housing in the Last Year: No     Number of Places Lived in the Last Year: 1     Unstable Housing in the Last Year: No     Pt works as a home health aide, reports that her children will be able to help take care of her after surgery  Denies tobacco/alcohol/illicit drug use.    All:  NKDA  Review of patient's allergies indicates:   Allergen Reactions    Shellfish containing products Hives and Itching       Meds:  Prior to Admission medications    Medication Sig Start Date End Date Taking? Authorizing Provider   apixaban (ELIQUIS) 5 mg Tab Take 1 tablet (5 mg total) by mouth 2 (two) times daily. 7/31/23 1/27/24  Ramiro Friedman DO   ferrous sulfate 325 (65 FE) MG EC tablet Take 1 tablet (325 mg total) by mouth once daily. Take with Vit C 500 mg with iron med to increase absorption. 9/18/23 3/16/24  Sera Villarreal, SOPHIAP   methocarbamoL (ROBAXIN) 750 MG Tab Take 1 tablet (750 mg total) by mouth every evening. 7/31/23   Ramiro Friedman DO     Review of Systems: As stated in HPI.      Objective:     Vitals:    11/20/23 0906   BP: 124/80   Pulse: 86   Resp: 14   Temp: 98.8 °F (37.1 °C)   SpO2: 100%   Weight: 113.7 kg (250 lb 9.6 oz)   Height: 5' 1" (1.549 m)     Body mass index is 47.35 kg/m².    PHYSICAL EXAM  GEN: NAD, A&O x3  CV: RRR   LUNGS: CTABL  ABDOMEN: soft, NTND, no masses  INCISIONS: Laparoscopic incision in upper abdomen well-healed, pfannenstiel incisions well-visualized   VAGINA: Moist, no lesions or masses  VULVA: Normal external genitalia  CERVIX: Well visualized without any masses or lesions. No CMT. Os normal in appearance without bleeding or discharge.   UTERUS: 18 cm in size, mobile, irregular in contour, good descent   ADNEXA: No fullness, masses or tenderness    RN present for entirety of exam     LABS:  Last mammogram: 08/23 BIRADS-1  Last pap: 06/22 NILM, HPV negative   EMB: 1. Endometrial curettings:  - " Scant strips of endometrial epithelium and stroma, and fragments of benign endocervical tissue with predominantly blood.     2. Endometrial biopsy:  - Fragments of benign myometrium and endocervical tissue.       Colonoscopy: reports recent cologard completion, states that it was negative     IMAGING:  CT A/P     Impression:     1. Patchy retention of contrast at the kidneys may be related to acute renal injury, pyelonephritis or less likely obstructive change.  Mild left pelvicaliectasis likely related to compression of the left ureter at the pelvic brim by the enlarged uterus.  2. Globular enlargement of the uterus with large pedunculated lesion at the fundus/left adnexa suggestive of fibroids.  This is poorly characterized without contrast.  3.  The preliminary and final reports are concordant.      Assessment:      53 y.o.  with a history of AUB-L for definitive surgical management.  1. Abnormal uterine bleeding  CBC Without Differential    Type & Screen        Plan:     Counseling: Alternatives to this planned procedure were explained to the patient including expectant, medical and other types of surgical management. This procedure and its risks, reasons, benefits and complications (including injury to bowel, bladder, major blood vessel, ureter, bleeding, possibility of transfusion, infection, scarring, dyspareunia, erosion, further surgery, worsening incontinence, failure of the procedure or fistula formation) were reviewed in detail.    We have reviewed the typical perioperative course with hospital stay, and post-operative precautions and restrictions have been reviewed.    Additionally, ovarian conservation versus elective risk reducing ovarian resection were discussed with the patient.  She understands the risk for reoperation for ovarian etiology to be 5-10%, the risk for ovarian cancer in women to be 1 in 70, and the protective effects of ovarian hormones including cardiovascular and bone  health.  After discussion, the patient desires ovarian conservation with the understanding that if any disease is suspected or bleeding that necessitates it - they may be removed.   Pt counseled on the risks related to occult Leiomyosarcoma (1 in 770 to 1 in 10,000 based on recent ACOG publication) and the likely need for morcellation given size of uterine fibroid. Pt verbalized understanding.  Patient counseled on the fact that cystotomy complicates approximately 0.3 to 11/1000 benign gynecologic surgeries, especially urogynecologic procedures and hysterectomy.  Patient is aware that, in the event of cystotomy, continuous bladder drainage will be continued for 7-10 days with Shields catheter in place. Patient particularly counseled on her increased risk for cystotomy given her history of pelvic surgery  Counseled on ureteral injury rates of 0.2-7.3%, bowel injury rates of <1%.   Transfusion of blood and blood products discussed. Patient was consented for blood transfusion in the case of an emergency.  She understands the possibility of blood transfusion reaction and the attendant risk of transmission of HIV & Hepatitis C to be 1 in 2 million and the risk of Hepatitis B to be 1 in 200,000.  She is aware of the possibility of transfusion reaction. All questions were answered.   Patient understands we are affiliated with a teaching institution and residents and medical students will be involved in her care.  She has consented to an exam under anesthesia and understands that medical students participate in this portion of the procedure.  All questions were answered.    Preop testing ordered.  Surgery case requested. Surgical consents signed.  Instructions reviewed, including NPO after midnight.   PAT appointment: Requested   Current contraception: s/p BTL     To OR for TLH/BS/Cysto with Dr. Thibodeaux   Scheduled on 12/5/23    Discussed with Dr. Provost Venus Overton MD   [facility] PGY-3

## 2023-11-19 NOTE — PROGRESS NOTES
[facility] Preoperative Visit History and Physical    HPI:   53 y.o.  with a history of AUB-L     Heavy menstrual bleeding started in  but has been irregular for as long as she can remember. She has tried Mirena, Provera, and Aygestin without much relief. She was admitted to the hospital from - initially in the setting of symptomatic anemia with a H/H of 5.3/19.8 for which she received 4u PRBCs, however she was found to also have a PE during the admission. Aygestin was discontinued at this time, pt has been on Eliquis since. Since that time she has been intermittently spotting a few times per week without episodes of heavy bleeding.      Of note, she was first evaluated for AUB in  with EMB showing focal hyperplasia with atypia. Hysteroscopy in in  was also benign. She has required a total of 3 transfusions over the past year. She was previously reluctant to undergo hysterectomy but has now decided that she is ready. No additional complaints today.      PMH:  Past Medical History:   Diagnosis Date    Abnormal Pap smear of cervix     Anemia     Chronic back pain     Chronic hypertension     Obesity, Class III, BMI 40-49.9 (morbid obesity)     Other pulmonary embolism without acute cor pulmonale     history of PE in      SurgHx:  Past Surgical History:   Procedure Laterality Date     SECTION      x2    CHOLECYSTECTOMY      HYSTEROSCOPY WITH DILATION AND CURETTAGE OF UTERUS N/A 2022    Procedure: HYSTEROSCOPY, WITH DILATION AND CURETTAGE OF UTERUS;  Surgeon: Susie Thibodeaux MD;  Location: [facility];  Service: OB/GYN;  Laterality: N/A;  **MYOSURE**    TUBAL LIGATION         ObHx:  OB History    Para Term  AB Living   2 2 2     2   SAB IAB Ectopic Multiple Live Births           2      # Outcome Date GA Lbr Kevin/2nd Weight Sex Delivery Anes PTL Lv   2 Term     M CS-LTranv  N LESLIE   1 Term     F CS-LTranv  N LESLIE      Obstetric Comments   BB 8lb 4oz    Triad: 13/I/5-7       GynHx:  13/I/5-7  Contraception: S/p BTL     Remote hx of Chlamydia   06/22: NILM, HR HPV-    Remote hx of abnormal pap smear     FamHx:  Family History   Problem Relation Age of Onset    Hypertension Mother     No Known Problems Father      Denies history of breast, ovarian, endometrial, colon cancers.    SocialHx:  Social History     Socioeconomic History    Marital status: Single   Tobacco Use    Smoking status: Never    Smokeless tobacco: Never   Substance and Sexual Activity    Alcohol use: Never    Drug use: Never    Sexual activity: Not Currently     Partners: Male     Social Determinants of Health     Financial Resource Strain: Medium Risk (7/24/2023)    Overall Financial Resource Strain (CARDIA)     Difficulty of Paying Living Expenses: Somewhat hard   Food Insecurity: No Food Insecurity (7/24/2023)    Hunger Vital Sign     Worried About Running Out of Food in the Last Year: Never true     Ran Out of Food in the Last Year: Never true   Transportation Needs: No Transportation Needs (7/24/2023)    PRAPARE - Transportation     Lack of Transportation (Medical): No     Lack of Transportation (Non-Medical): No   Physical Activity: Inactive (7/24/2023)    Exercise Vital Sign     Days of Exercise per Week: 0 days     Minutes of Exercise per Session: 0 min   Stress: Stress Concern Present (7/24/2023)    [employer] - Occupational Stress Questionnaire     Feeling of Stress : To some extent   Social Connections: Moderately Isolated (7/24/2023)    Social Connection and Isolation Panel [NHANES]     Frequency of Communication with Friends and Family: More than three times a week     Frequency of Social Gatherings with Friends and Family: More than three times a week     Attends Yazdanism Services: More than 4 times per year     Active Member of Clubs or Organizations: No     Attends Club or Organization Meetings: Never     Marital Status: Never    Housing  "Stability: Low Risk  (7/24/2023)    Housing Stability Vital Sign     Unable to Pay for Housing in the Last Year: No     Number of Places Lived in the Last Year: 1     Unstable Housing in the Last Year: No     Pt works as a home health aide, reports that her children will be able to help take care of her after surgery  Denies tobacco/alcohol/illicit drug use.    All:  NKDA  Review of patient's allergies indicates:   Allergen Reactions    Shellfish containing products Hives and Itching       Meds:  Prior to Admission medications    Medication Sig Start Date End Date Taking? Authorizing Provider   apixaban (ELIQUIS) 5 mg Tab Take 1 tablet (5 mg total) by mouth 2 (two) times daily. 7/31/23 1/27/24  Ramiro Friedman DO   ferrous sulfate 325 (65 FE) MG EC tablet Take 1 tablet (325 mg total) by mouth once daily. Take with Vit C 500 mg with iron med to increase absorption. 9/18/23 3/16/24  Sera Villarreal, SOPHIAP   methocarbamoL (ROBAXIN) 750 MG Tab Take 1 tablet (750 mg total) by mouth every evening. 7/31/23   Ramiro Friedman DO     Review of Systems: As stated in HPI.      Objective:     Vitals:    11/20/23 0906   BP: 124/80   Pulse: 86   Resp: 14   Temp: 98.8 °F (37.1 °C)   SpO2: 100%   Weight: 113.7 kg (250 lb 9.6 oz)   Height: 5' 1" (1.549 m)     Body mass index is 47.35 kg/m².    PHYSICAL EXAM  GEN: NAD, A&O x3  CV: RRR   LUNGS: CTABL  ABDOMEN: soft, NTND, no masses  INCISIONS: Laparoscopic incision in upper abdomen well-healed, pfannenstiel incisions well-visualized   VAGINA: Moist, no lesions or masses  VULVA: Normal external genitalia  CERVIX: Well visualized without any masses or lesions. No CMT. Os normal in appearance without bleeding or discharge.   UTERUS: 18 cm in size, mobile, irregular in contour, good descent   ADNEXA: No fullness, masses or tenderness    RN present for entirety of exam     LABS:  Last mammogram: 08/23 BIRADS-1  Last pap: 06/22 NILM, HPV negative   EMB: 1. Endometrial curettings:  - " Scant strips of endometrial epithelium and stroma, and fragments of benign endocervical tissue with predominantly blood.     2. Endometrial biopsy:  - Fragments of benign myometrium and endocervical tissue.       Colonoscopy: reports recent cologard completion, states that it was negative     IMAGING:  CT A/P     Impression:     1. Patchy retention of contrast at the kidneys may be related to acute renal injury, pyelonephritis or less likely obstructive change.  Mild left pelvicaliectasis likely related to compression of the left ureter at the pelvic brim by the enlarged uterus.  2. Globular enlargement of the uterus with large pedunculated lesion at the fundus/left adnexa suggestive of fibroids.  This is poorly characterized without contrast.  3.  The preliminary and final reports are concordant.      Assessment:      53 y.o.  with a history of AUB-L for definitive surgical management.  1. Abnormal uterine bleeding  CBC Without Differential    Type & Screen        Plan:     Counseling: Alternatives to this planned procedure were explained to the patient including expectant, medical and other types of surgical management. This procedure and its risks, reasons, benefits and complications (including injury to bowel, bladder, major blood vessel, ureter, bleeding, possibility of transfusion, infection, scarring, dyspareunia, erosion, further surgery, worsening incontinence, failure of the procedure or fistula formation) were reviewed in detail.    We have reviewed the typical perioperative course with hospital stay, and post-operative precautions and restrictions have been reviewed.    Additionally, ovarian conservation versus elective risk reducing ovarian resection were discussed with the patient.  She understands the risk for reoperation for ovarian etiology to be 5-10%, the risk for ovarian cancer in women to be 1 in 70, and the protective effects of ovarian hormones including cardiovascular and bone  health.  After discussion, the patient desires ovarian conservation with the understanding that if any disease is suspected or bleeding that necessitates it - they may be removed.   Pt counseled on the risks related to occult Leiomyosarcoma (1 in 770 to 1 in 10,000 based on recent ACOG publication) and the likely need for morcellation given size of uterine fibroid. Pt verbalized understanding.  Patient counseled on the fact that cystotomy complicates approximately 0.3 to 11/1000 benign gynecologic surgeries, especially urogynecologic procedures and hysterectomy.  Patient is aware that, in the event of cystotomy, continuous bladder drainage will be continued for 7-10 days with Shields catheter in place. Patient particularly counseled on her increased risk for cystotomy given her history of pelvic surgery  Counseled on ureteral injury rates of 0.2-7.3%, bowel injury rates of <1%.   Transfusion of blood and blood products discussed. Patient was consented for blood transfusion in the case of an emergency.  She understands the possibility of blood transfusion reaction and the attendant risk of transmission of HIV & Hepatitis C to be 1 in 2 million and the risk of Hepatitis B to be 1 in 200,000.  She is aware of the possibility of transfusion reaction. All questions were answered.   Patient understands we are affiliated with a teaching institution and residents and medical students will be involved in her care.  She has consented to an exam under anesthesia and understands that medical students participate in this portion of the procedure.  All questions were answered.    Preop testing ordered.  Surgery case requested. Surgical consents signed.  Instructions reviewed, including NPO after midnight.   PAT appointment: Requested   Current contraception: s/p BTL     To OR for TLH/BS/Cysto with Dr. Thibodeaux   Scheduled on 12/5/23    Discussed with Dr. Provost Venus Overton MD   [employer] [facility] PGY-3

## 2023-11-20 ENCOUNTER — OFFICE VISIT (OUTPATIENT)
Dept: GYNECOLOGY | Facility: CLINIC | Age: 53
End: 2023-11-20
Payer: COMMERCIAL

## 2023-11-20 ENCOUNTER — LAB VISIT (OUTPATIENT)
Dept: LAB | Facility: HOSPITAL | Age: 53
End: 2023-11-20
Attending: STUDENT IN AN ORGANIZED HEALTH CARE EDUCATION/TRAINING PROGRAM
Payer: COMMERCIAL

## 2023-11-20 VITALS
BODY MASS INDEX: 47.32 KG/M2 | WEIGHT: 250.63 LBS | TEMPERATURE: 99 F | DIASTOLIC BLOOD PRESSURE: 80 MMHG | HEART RATE: 86 BPM | RESPIRATION RATE: 14 BRPM | OXYGEN SATURATION: 100 % | HEIGHT: 61 IN | SYSTOLIC BLOOD PRESSURE: 124 MMHG

## 2023-11-20 DIAGNOSIS — Z13.6 SCREENING FOR HYPERTENSION: ICD-10-CM

## 2023-11-20 DIAGNOSIS — N93.9 ABNORMAL UTERINE BLEEDING: Primary | ICD-10-CM

## 2023-11-20 DIAGNOSIS — D50.0 IRON DEFICIENCY ANEMIA DUE TO CHRONIC BLOOD LOSS: ICD-10-CM

## 2023-11-20 DIAGNOSIS — N93.9 ABNORMAL UTERINE BLEEDING: ICD-10-CM

## 2023-11-20 DIAGNOSIS — I10 PRIMARY HYPERTENSION: ICD-10-CM

## 2023-11-20 LAB
ALBUMIN SERPL-MCNC: 3.6 G/DL (ref 3.5–5)
ALBUMIN/GLOB SERPL: 0.9 RATIO (ref 1.1–2)
ALP SERPL-CCNC: 61 UNIT/L (ref 40–150)
ALT SERPL-CCNC: 16 UNIT/L (ref 0–55)
AST SERPL-CCNC: 18 UNIT/L (ref 5–34)
BASOPHILS # BLD AUTO: 0.04 X10(3)/MCL
BASOPHILS NFR BLD AUTO: 0.6 %
BILIRUB SERPL-MCNC: 0.3 MG/DL
BUN SERPL-MCNC: 16 MG/DL (ref 9.8–20.1)
CALCIUM SERPL-MCNC: 9.2 MG/DL (ref 8.4–10.2)
CHLORIDE SERPL-SCNC: 108 MMOL/L (ref 98–107)
CHOLEST SERPL-MCNC: 154 MG/DL
CHOLEST/HDLC SERPL: 3 {RATIO} (ref 0–5)
CO2 SERPL-SCNC: 26 MMOL/L (ref 22–29)
CREAT SERPL-MCNC: 0.85 MG/DL (ref 0.55–1.02)
EOSINOPHIL # BLD AUTO: 0.17 X10(3)/MCL (ref 0–0.9)
EOSINOPHIL NFR BLD AUTO: 2.6 %
ERYTHROCYTE [DISTWIDTH] IN BLOOD BY AUTOMATED COUNT: 19.9 % (ref 11.5–17)
EST. AVERAGE GLUCOSE BLD GHB EST-MCNC: 99.7 MG/DL
GFR SERPLBLD CREATININE-BSD FMLA CKD-EPI: >60 MLS/MIN/1.73/M2
GLOBULIN SER-MCNC: 3.8 GM/DL (ref 2.4–3.5)
GLUCOSE SERPL-MCNC: 93 MG/DL (ref 74–100)
GROUP & RH: NORMAL
HBA1C MFR BLD: 5.1 %
HCT VFR BLD AUTO: 32.4 % (ref 37–47)
HDLC SERPL-MCNC: 52 MG/DL (ref 35–60)
HGB BLD-MCNC: 9.6 G/DL (ref 12–16)
IMM GRANULOCYTES # BLD AUTO: 0.02 X10(3)/MCL (ref 0–0.04)
IMM GRANULOCYTES NFR BLD AUTO: 0.3 %
INDIRECT COOMBS GEL: NORMAL
IRON SATN MFR SERPL: 6 % (ref 20–50)
IRON SERPL-MCNC: 17 UG/DL (ref 50–170)
LDLC SERPL CALC-MCNC: 94 MG/DL (ref 50–140)
LYMPHOCYTES # BLD AUTO: 1.52 X10(3)/MCL (ref 0.6–4.6)
LYMPHOCYTES NFR BLD AUTO: 23.5 %
MCH RBC QN AUTO: 25.1 PG (ref 27–31)
MCHC RBC AUTO-ENTMCNC: 29.6 G/DL (ref 33–36)
MCV RBC AUTO: 84.8 FL (ref 80–94)
MONOCYTES # BLD AUTO: 0.48 X10(3)/MCL (ref 0.1–1.3)
MONOCYTES NFR BLD AUTO: 7.4 %
NEUTROPHILS # BLD AUTO: 4.25 X10(3)/MCL (ref 2.1–9.2)
NEUTROPHILS NFR BLD AUTO: 65.6 %
NRBC BLD AUTO-RTO: 0 %
PLATELET # BLD AUTO: 299 X10(3)/MCL (ref 130–400)
PMV BLD AUTO: 10.5 FL (ref 7.4–10.4)
POTASSIUM SERPL-SCNC: 4.3 MMOL/L (ref 3.5–5.1)
PROT SERPL-MCNC: 7.4 GM/DL (ref 6.4–8.3)
RBC # BLD AUTO: 3.82 X10(6)/MCL (ref 4.2–5.4)
SODIUM SERPL-SCNC: 142 MMOL/L (ref 136–145)
SPECIMEN OUTDATE: NORMAL
TIBC SERPL-MCNC: 278 UG/DL (ref 70–310)
TIBC SERPL-MCNC: 295 UG/DL (ref 250–450)
TRANSFERRIN SERPL-MCNC: 274 MG/DL (ref 180–382)
TRIGL SERPL-MCNC: 42 MG/DL (ref 37–140)
TSH SERPL-ACNC: 0.78 UIU/ML (ref 0.35–4.94)
VLDLC SERPL CALC-MCNC: 8 MG/DL
WBC # SPEC AUTO: 6.48 X10(3)/MCL (ref 4.5–11.5)

## 2023-11-20 PROCEDURE — 84443 ASSAY THYROID STIM HORMONE: CPT

## 2023-11-20 PROCEDURE — 83036 HEMOGLOBIN GLYCOSYLATED A1C: CPT

## 2023-11-20 PROCEDURE — 99213 OFFICE O/P EST LOW 20 MIN: CPT | Mod: PBBFAC

## 2023-11-20 PROCEDURE — 36415 COLL VENOUS BLD VENIPUNCTURE: CPT

## 2023-11-20 PROCEDURE — 80053 COMPREHEN METABOLIC PANEL: CPT

## 2023-11-20 PROCEDURE — 83540 ASSAY OF IRON: CPT

## 2023-11-20 PROCEDURE — 80061 LIPID PANEL: CPT

## 2023-11-20 PROCEDURE — 85025 COMPLETE CBC W/AUTO DIFF WBC: CPT

## 2023-11-20 PROCEDURE — 86900 BLOOD TYPING SEROLOGIC ABO: CPT | Performed by: STUDENT IN AN ORGANIZED HEALTH CARE EDUCATION/TRAINING PROGRAM

## 2023-12-04 ENCOUNTER — ANESTHESIA EVENT (OUTPATIENT)
Dept: SURGERY | Facility: HOSPITAL | Age: 53
DRG: 742 | End: 2023-12-04
Payer: COMMERCIAL

## 2023-12-05 ENCOUNTER — HOSPITAL ENCOUNTER (INPATIENT)
Facility: HOSPITAL | Age: 53
LOS: 1 days | Discharge: HOME | DRG: 742 | End: 2023-12-06
Attending: OBSTETRICS & GYNECOLOGY | Admitting: OBSTETRICS & GYNECOLOGY
Payer: COMMERCIAL

## 2023-12-05 ENCOUNTER — ANESTHESIA (OUTPATIENT)
Dept: SURGERY | Facility: HOSPITAL | Age: 53
DRG: 742 | End: 2023-12-05
Payer: COMMERCIAL

## 2023-12-05 DIAGNOSIS — N93.9 ABNORMAL UTERINE BLEEDING: ICD-10-CM

## 2023-12-05 DIAGNOSIS — D21.9 FIBROID: Primary | ICD-10-CM

## 2023-12-05 DIAGNOSIS — N73.6 PELVIC ADHESIVE DISEASE: ICD-10-CM

## 2023-12-05 DIAGNOSIS — N93.9 ABNORMAL UTERINE BLEEDING (AUB): ICD-10-CM

## 2023-12-05 DIAGNOSIS — Z98.891 HISTORY OF CESAREAN DELIVERY: ICD-10-CM

## 2023-12-05 DIAGNOSIS — D25.9 FIBROID OF CERVIX: ICD-10-CM

## 2023-12-05 DIAGNOSIS — Z90.710 S/P HYSTERECTOMY: ICD-10-CM

## 2023-12-05 DIAGNOSIS — Z53.31 LAPAROSCOPIC SURGICAL PROCEDURE CONVERTED TO OPEN PROCEDURE: ICD-10-CM

## 2023-12-05 DIAGNOSIS — D21.9 FIBROIDS: ICD-10-CM

## 2023-12-05 DIAGNOSIS — D64.9 ANEMIA, UNSPECIFIED TYPE: ICD-10-CM

## 2023-12-05 LAB
B-HCG UR QL: NEGATIVE
BASOPHILS # BLD AUTO: 0.04 X10(3)/MCL
BASOPHILS NFR BLD AUTO: 0.6 %
CTP QC/QA: YES
EOSINOPHIL # BLD AUTO: 0.24 X10(3)/MCL (ref 0–0.9)
EOSINOPHIL NFR BLD AUTO: 3.9 %
ERYTHROCYTE [DISTWIDTH] IN BLOOD BY AUTOMATED COUNT: 18.1 % (ref 11.5–17)
GROUP & RH: NORMAL
HCT VFR BLD AUTO: 36.9 % (ref 37–47)
HCT VFR BLD AUTO: 37.8 % (ref 37–47)
HGB BLD-MCNC: 10.7 G/DL (ref 12–16)
HGB BLD-MCNC: 11 G/DL (ref 12–16)
IMM GRANULOCYTES # BLD AUTO: 0.02 X10(3)/MCL (ref 0–0.04)
IMM GRANULOCYTES NFR BLD AUTO: 0.3 %
INDIRECT COOMBS GEL: NORMAL
LYMPHOCYTES # BLD AUTO: 1.59 X10(3)/MCL (ref 0.6–4.6)
LYMPHOCYTES NFR BLD AUTO: 25.5 %
MCH RBC QN AUTO: 25.7 PG (ref 27–31)
MCHC RBC AUTO-ENTMCNC: 29 G/DL (ref 33–36)
MCV RBC AUTO: 88.5 FL (ref 80–94)
MONOCYTES # BLD AUTO: 0.46 X10(3)/MCL (ref 0.1–1.3)
MONOCYTES NFR BLD AUTO: 7.4 %
NEUTROPHILS # BLD AUTO: 3.88 X10(3)/MCL (ref 2.1–9.2)
NEUTROPHILS NFR BLD AUTO: 62.3 %
NRBC BLD AUTO-RTO: 0 %
PLATELET # BLD AUTO: 273 X10(3)/MCL (ref 130–400)
PMV BLD AUTO: 12.2 FL (ref 7.4–10.4)
RBC # BLD AUTO: 4.17 X10(6)/MCL (ref 4.2–5.4)
SPECIMEN OUTDATE: NORMAL
WBC # SPEC AUTO: 6.23 X10(3)/MCL (ref 4.5–11.5)

## 2023-12-05 PROCEDURE — 63600175 PHARM REV CODE 636 W HCPCS: Performed by: NURSE ANESTHETIST, CERTIFIED REGISTERED

## 2023-12-05 PROCEDURE — 37000009 HC ANESTHESIA EA ADD 15 MINS: Performed by: OBSTETRICS & GYNECOLOGY

## 2023-12-05 PROCEDURE — 63600175 PHARM REV CODE 636 W HCPCS: Performed by: STUDENT IN AN ORGANIZED HEALTH CARE EDUCATION/TRAINING PROGRAM

## 2023-12-05 PROCEDURE — 25000003 PHARM REV CODE 250: Performed by: NURSE ANESTHETIST, CERTIFIED REGISTERED

## 2023-12-05 PROCEDURE — 21400001 HC TELEMETRY ROOM

## 2023-12-05 PROCEDURE — 25000003 PHARM REV CODE 250: Performed by: OBSTETRICS & GYNECOLOGY

## 2023-12-05 PROCEDURE — 71000033 HC RECOVERY, INTIAL HOUR: Performed by: OBSTETRICS & GYNECOLOGY

## 2023-12-05 PROCEDURE — 86920 COMPATIBILITY TEST SPIN: CPT | Performed by: STUDENT IN AN ORGANIZED HEALTH CARE EDUCATION/TRAINING PROGRAM

## 2023-12-05 PROCEDURE — 63600175 PHARM REV CODE 636 W HCPCS: Performed by: ANESTHESIOLOGY

## 2023-12-05 PROCEDURE — 36000710: Performed by: OBSTETRICS & GYNECOLOGY

## 2023-12-05 PROCEDURE — 36000711: Performed by: OBSTETRICS & GYNECOLOGY

## 2023-12-05 PROCEDURE — 88307 TISSUE EXAM BY PATHOLOGIST: CPT | Mod: TC | Performed by: OBSTETRICS & GYNECOLOGY

## 2023-12-05 PROCEDURE — D9220A PRA ANESTHESIA: Mod: CRNA,,, | Performed by: NURSE ANESTHETIST, CERTIFIED REGISTERED

## 2023-12-05 PROCEDURE — 37000008 HC ANESTHESIA 1ST 15 MINUTES: Performed by: OBSTETRICS & GYNECOLOGY

## 2023-12-05 PROCEDURE — 85014 HEMATOCRIT: CPT | Performed by: STUDENT IN AN ORGANIZED HEALTH CARE EDUCATION/TRAINING PROGRAM

## 2023-12-05 PROCEDURE — 25000003 PHARM REV CODE 250: Performed by: STUDENT IN AN ORGANIZED HEALTH CARE EDUCATION/TRAINING PROGRAM

## 2023-12-05 PROCEDURE — D9220A PRA ANESTHESIA: ICD-10-PCS | Mod: CRNA,,, | Performed by: NURSE ANESTHETIST, CERTIFIED REGISTERED

## 2023-12-05 PROCEDURE — 86850 RBC ANTIBODY SCREEN: CPT | Performed by: STUDENT IN AN ORGANIZED HEALTH CARE EDUCATION/TRAINING PROGRAM

## 2023-12-05 PROCEDURE — 27201423 OPTIME MED/SURG SUP & DEVICES STERILE SUPPLY: Performed by: OBSTETRICS & GYNECOLOGY

## 2023-12-05 PROCEDURE — 81025 URINE PREGNANCY TEST: CPT | Performed by: STUDENT IN AN ORGANIZED HEALTH CARE EDUCATION/TRAINING PROGRAM

## 2023-12-05 PROCEDURE — 71000039 HC RECOVERY, EACH ADD'L HOUR: Performed by: OBSTETRICS & GYNECOLOGY

## 2023-12-05 PROCEDURE — 85025 COMPLETE CBC W/AUTO DIFF WBC: CPT | Performed by: STUDENT IN AN ORGANIZED HEALTH CARE EDUCATION/TRAINING PROGRAM

## 2023-12-05 PROCEDURE — 25000003 PHARM REV CODE 250: Performed by: ANESTHESIOLOGY

## 2023-12-05 PROCEDURE — D9220A PRA ANESTHESIA: ICD-10-PCS | Mod: ANES,,, | Performed by: ANESTHESIOLOGY

## 2023-12-05 PROCEDURE — D9220A PRA ANESTHESIA: Mod: ANES,,, | Performed by: ANESTHESIOLOGY

## 2023-12-05 RX ORDER — KETOROLAC TROMETHAMINE 30 MG/ML
INJECTION, SOLUTION INTRAMUSCULAR; INTRAVENOUS
Status: DISCONTINUED | OUTPATIENT
Start: 2023-12-05 | End: 2023-12-05

## 2023-12-05 RX ORDER — ONDANSETRON 2 MG/ML
4 INJECTION INTRAMUSCULAR; INTRAVENOUS DAILY PRN
Status: DISCONTINUED | OUTPATIENT
Start: 2023-12-05 | End: 2023-12-05 | Stop reason: HOSPADM

## 2023-12-05 RX ORDER — ONDANSETRON 2 MG/ML
INJECTION INTRAMUSCULAR; INTRAVENOUS
Status: DISCONTINUED | OUTPATIENT
Start: 2023-12-05 | End: 2023-12-05

## 2023-12-05 RX ORDER — VASOPRESSIN 20 [USP'U]/ML
20 INJECTION, SOLUTION INTRAMUSCULAR; SUBCUTANEOUS ONCE
Status: DISCONTINUED | OUTPATIENT
Start: 2023-12-05 | End: 2023-12-05

## 2023-12-05 RX ORDER — GABAPENTIN 300 MG/1
300 CAPSULE ORAL 3 TIMES DAILY
Status: DISCONTINUED | OUTPATIENT
Start: 2023-12-05 | End: 2023-12-06 | Stop reason: HOSPADM

## 2023-12-05 RX ORDER — POLYETHYLENE GLYCOL 3350 17 G/17G
17 POWDER, FOR SOLUTION ORAL DAILY
Status: DISCONTINUED | OUTPATIENT
Start: 2023-12-05 | End: 2023-12-06 | Stop reason: HOSPADM

## 2023-12-05 RX ORDER — ACETAMINOPHEN 500 MG
1000 TABLET ORAL
Status: COMPLETED | OUTPATIENT
Start: 2023-12-05 | End: 2023-12-05

## 2023-12-05 RX ORDER — CEFAZOLIN SODIUM 1 G/3ML
2 INJECTION, POWDER, FOR SOLUTION INTRAMUSCULAR; INTRAVENOUS
Status: COMPLETED | OUTPATIENT
Start: 2023-12-05 | End: 2023-12-05

## 2023-12-05 RX ORDER — FENTANYL CITRATE 50 UG/ML
INJECTION, SOLUTION INTRAMUSCULAR; INTRAVENOUS
Status: DISCONTINUED | OUTPATIENT
Start: 2023-12-05 | End: 2023-12-05

## 2023-12-05 RX ORDER — OXYCODONE HYDROCHLORIDE 5 MG/1
5 TABLET ORAL EVERY 4 HOURS PRN
Qty: 12 TABLET | Refills: 0 | Status: SHIPPED | OUTPATIENT
Start: 2023-12-05 | End: 2023-12-06 | Stop reason: SDUPTHER

## 2023-12-05 RX ORDER — MUPIROCIN 20 MG/G
OINTMENT TOPICAL 2 TIMES DAILY
Status: DISCONTINUED | OUTPATIENT
Start: 2023-12-05 | End: 2023-12-06 | Stop reason: HOSPADM

## 2023-12-05 RX ORDER — BISACODYL 10 MG
10 SUPPOSITORY, RECTAL RECTAL DAILY PRN
Status: DISCONTINUED | OUTPATIENT
Start: 2023-12-05 | End: 2023-12-06 | Stop reason: HOSPADM

## 2023-12-05 RX ORDER — LIDOCAINE HYDROCHLORIDE 10 MG/ML
INJECTION INFILTRATION; PERINEURAL
Status: DISCONTINUED | OUTPATIENT
Start: 2023-12-05 | End: 2023-12-05 | Stop reason: HOSPADM

## 2023-12-05 RX ORDER — SODIUM CHLORIDE 9 MG/ML
INJECTION, SOLUTION INTRAVENOUS CONTINUOUS
Status: DISCONTINUED | OUTPATIENT
Start: 2023-12-05 | End: 2023-12-05

## 2023-12-05 RX ORDER — HYDROMORPHONE HYDROCHLORIDE 1 MG/ML
INJECTION, SOLUTION INTRAMUSCULAR; INTRAVENOUS; SUBCUTANEOUS
Status: DISCONTINUED | OUTPATIENT
Start: 2023-12-05 | End: 2023-12-05

## 2023-12-05 RX ORDER — SENNOSIDES 8.6 MG/1
2 TABLET ORAL NIGHTLY
Qty: 28 TABLET | Refills: 0 | Status: SHIPPED | OUTPATIENT
Start: 2023-12-05 | End: 2023-12-19

## 2023-12-05 RX ORDER — IBUPROFEN 600 MG/1
600 TABLET ORAL EVERY 6 HOURS
Qty: 120 TABLET | Refills: 0 | Status: SHIPPED | OUTPATIENT
Start: 2023-12-05 | End: 2024-01-04

## 2023-12-05 RX ORDER — HYDROMORPHONE HYDROCHLORIDE 1 MG/ML
0.5 INJECTION, SOLUTION INTRAMUSCULAR; INTRAVENOUS; SUBCUTANEOUS EVERY 4 HOURS PRN
Status: DISCONTINUED | OUTPATIENT
Start: 2023-12-05 | End: 2023-12-06 | Stop reason: HOSPADM

## 2023-12-05 RX ORDER — OXYCODONE HYDROCHLORIDE 5 MG/1
5 TABLET ORAL
Status: DISCONTINUED | OUTPATIENT
Start: 2023-12-05 | End: 2023-12-05 | Stop reason: HOSPADM

## 2023-12-05 RX ORDER — SODIUM CHLORIDE, SODIUM LACTATE, POTASSIUM CHLORIDE, CALCIUM CHLORIDE 600; 310; 30; 20 MG/100ML; MG/100ML; MG/100ML; MG/100ML
INJECTION, SOLUTION INTRAVENOUS CONTINUOUS
Status: ACTIVE | OUTPATIENT
Start: 2023-12-05 | End: 2023-12-05

## 2023-12-05 RX ORDER — FAMOTIDINE 10 MG/ML
20 INJECTION INTRAVENOUS EVERY 12 HOURS
Status: DISCONTINUED | OUTPATIENT
Start: 2023-12-05 | End: 2023-12-06 | Stop reason: HOSPADM

## 2023-12-05 RX ORDER — LIDOCAINE HYDROCHLORIDE 20 MG/ML
INJECTION INTRAVENOUS
Status: DISCONTINUED | OUTPATIENT
Start: 2023-12-05 | End: 2023-12-05

## 2023-12-05 RX ORDER — SODIUM CHLORIDE 0.9 % (FLUSH) 0.9 %
10 SYRINGE (ML) INJECTION
Status: DISCONTINUED | OUTPATIENT
Start: 2023-12-05 | End: 2023-12-05

## 2023-12-05 RX ORDER — MUPIROCIN 20 MG/G
OINTMENT TOPICAL
Status: DISCONTINUED | OUTPATIENT
Start: 2023-12-05 | End: 2023-12-05

## 2023-12-05 RX ORDER — DEXAMETHASONE SODIUM PHOSPHATE 4 MG/ML
INJECTION, SOLUTION INTRA-ARTICULAR; INTRALESIONAL; INTRAMUSCULAR; INTRAVENOUS; SOFT TISSUE
Status: DISCONTINUED | OUTPATIENT
Start: 2023-12-05 | End: 2023-12-05

## 2023-12-05 RX ORDER — DIPHENHYDRAMINE HCL 25 MG
25 CAPSULE ORAL EVERY 4 HOURS PRN
Status: DISCONTINUED | OUTPATIENT
Start: 2023-12-05 | End: 2023-12-06 | Stop reason: HOSPADM

## 2023-12-05 RX ORDER — MEPERIDINE HYDROCHLORIDE 25 MG/ML
12.5 INJECTION INTRAMUSCULAR; INTRAVENOUS; SUBCUTANEOUS EVERY 10 MIN PRN
Status: DISCONTINUED | OUTPATIENT
Start: 2023-12-05 | End: 2023-12-05 | Stop reason: HOSPADM

## 2023-12-05 RX ORDER — FAMOTIDINE 20 MG/1
20 TABLET, FILM COATED ORAL
Status: COMPLETED | OUTPATIENT
Start: 2023-12-05 | End: 2023-12-05

## 2023-12-05 RX ORDER — SODIUM CHLORIDE, SODIUM LACTATE, POTASSIUM CHLORIDE, CALCIUM CHLORIDE 600; 310; 30; 20 MG/100ML; MG/100ML; MG/100ML; MG/100ML
INJECTION, SOLUTION INTRAVENOUS CONTINUOUS
Status: DISCONTINUED | OUTPATIENT
Start: 2023-12-05 | End: 2023-12-05

## 2023-12-05 RX ORDER — DIPHENHYDRAMINE HYDROCHLORIDE 50 MG/ML
25 INJECTION INTRAMUSCULAR; INTRAVENOUS EVERY 4 HOURS PRN
Status: DISCONTINUED | OUTPATIENT
Start: 2023-12-05 | End: 2023-12-06 | Stop reason: HOSPADM

## 2023-12-05 RX ORDER — OXYCODONE HYDROCHLORIDE 5 MG/1
5 TABLET ORAL EVERY 4 HOURS PRN
Status: DISCONTINUED | OUTPATIENT
Start: 2023-12-05 | End: 2023-12-06 | Stop reason: HOSPADM

## 2023-12-05 RX ORDER — ACETAMINOPHEN 325 MG/1
975 TABLET ORAL EVERY 8 HOURS
Status: DISCONTINUED | OUTPATIENT
Start: 2023-12-05 | End: 2023-12-06 | Stop reason: HOSPADM

## 2023-12-05 RX ORDER — PROCHLORPERAZINE EDISYLATE 5 MG/ML
5 INJECTION INTRAMUSCULAR; INTRAVENOUS EVERY 6 HOURS PRN
Status: DISCONTINUED | OUTPATIENT
Start: 2023-12-05 | End: 2023-12-06 | Stop reason: HOSPADM

## 2023-12-05 RX ORDER — ACETAMINOPHEN 500 MG
1000 TABLET ORAL EVERY 6 HOURS
Qty: 240 TABLET | Refills: 0 | Status: SHIPPED | OUTPATIENT
Start: 2023-12-05 | End: 2024-01-04

## 2023-12-05 RX ORDER — PROPOFOL 10 MG/ML
VIAL (ML) INTRAVENOUS
Status: DISCONTINUED | OUTPATIENT
Start: 2023-12-05 | End: 2023-12-05

## 2023-12-05 RX ORDER — SUCCINYLCHOLINE CHLORIDE 20 MG/ML
INJECTION INTRAMUSCULAR; INTRAVENOUS
Status: DISCONTINUED | OUTPATIENT
Start: 2023-12-05 | End: 2023-12-05

## 2023-12-05 RX ORDER — ONDANSETRON 4 MG/1
8 TABLET, ORALLY DISINTEGRATING ORAL EVERY 8 HOURS PRN
Status: DISCONTINUED | OUTPATIENT
Start: 2023-12-05 | End: 2023-12-06 | Stop reason: HOSPADM

## 2023-12-05 RX ORDER — MORPHINE SULFATE 2 MG/ML
2 INJECTION, SOLUTION INTRAMUSCULAR; INTRAVENOUS EVERY 5 MIN PRN
Status: DISCONTINUED | OUTPATIENT
Start: 2023-12-05 | End: 2023-12-05 | Stop reason: HOSPADM

## 2023-12-05 RX ORDER — TRAMADOL HYDROCHLORIDE 50 MG/1
50 TABLET ORAL
Status: COMPLETED | OUTPATIENT
Start: 2023-12-05 | End: 2023-12-05

## 2023-12-05 RX ORDER — PHENYLEPHRINE HYDROCHLORIDE 10 MG/ML
INJECTION INTRAVENOUS
Status: DISCONTINUED | OUTPATIENT
Start: 2023-12-05 | End: 2023-12-05

## 2023-12-05 RX ORDER — ROCURONIUM BROMIDE 10 MG/ML
INJECTION, SOLUTION INTRAVENOUS
Status: DISCONTINUED | OUTPATIENT
Start: 2023-12-05 | End: 2023-12-05

## 2023-12-05 RX ORDER — MIDAZOLAM HYDROCHLORIDE 1 MG/ML
2 INJECTION INTRAMUSCULAR; INTRAVENOUS ONCE AS NEEDED
Status: COMPLETED | OUTPATIENT
Start: 2023-12-05 | End: 2023-12-05

## 2023-12-05 RX ADMIN — SODIUM CHLORIDE, POTASSIUM CHLORIDE, SODIUM LACTATE AND CALCIUM CHLORIDE: 600; 310; 30; 20 INJECTION, SOLUTION INTRAVENOUS at 10:12

## 2023-12-05 RX ADMIN — TRAMADOL HYDROCHLORIDE 50 MG: 50 TABLET, COATED ORAL at 07:12

## 2023-12-05 RX ADMIN — ONDANSETRON 8 MG: 4 TABLET, ORALLY DISINTEGRATING ORAL at 04:12

## 2023-12-05 RX ADMIN — MORPHINE SULFATE 2 MG: 2 INJECTION, SOLUTION INTRAMUSCULAR; INTRAVENOUS at 01:12

## 2023-12-05 RX ADMIN — ROCURONIUM BROMIDE 20 MG: 10 INJECTION INTRAVENOUS at 09:12

## 2023-12-05 RX ADMIN — POLYETHYLENE GLYCOL 3350 17 G: 17 POWDER, FOR SOLUTION ORAL at 04:12

## 2023-12-05 RX ADMIN — SUGAMMADEX 200 MG: 100 INJECTION, SOLUTION INTRAVENOUS at 12:12

## 2023-12-05 RX ADMIN — ACETAMINOPHEN 1000 MG: 500 TABLET, FILM COATED ORAL at 07:12

## 2023-12-05 RX ADMIN — PHENYLEPHRINE HYDROCHLORIDE 200 MCG: 10 INJECTION INTRAVENOUS at 11:12

## 2023-12-05 RX ADMIN — CEFAZOLIN 2 G: 330 INJECTION, POWDER, FOR SOLUTION INTRAMUSCULAR; INTRAVENOUS at 12:12

## 2023-12-05 RX ADMIN — OXYCODONE HYDROCHLORIDE 5 MG: 5 TABLET ORAL at 04:12

## 2023-12-05 RX ADMIN — KETOROLAC TROMETHAMINE 30 MG: 30 INJECTION, SOLUTION INTRAMUSCULAR at 11:12

## 2023-12-05 RX ADMIN — ONDANSETRON 4 MG: 2 INJECTION INTRAMUSCULAR; INTRAVENOUS at 11:12

## 2023-12-05 RX ADMIN — LIDOCAINE HYDROCHLORIDE 100 MG: 20 INJECTION INTRAVENOUS at 08:12

## 2023-12-05 RX ADMIN — SODIUM CHLORIDE, POTASSIUM CHLORIDE, SODIUM LACTATE AND CALCIUM CHLORIDE: 600; 310; 30; 20 INJECTION, SOLUTION INTRAVENOUS at 08:12

## 2023-12-05 RX ADMIN — GABAPENTIN 300 MG: 300 CAPSULE ORAL at 08:12

## 2023-12-05 RX ADMIN — CEFAZOLIN 2 G: 330 INJECTION, POWDER, FOR SOLUTION INTRAMUSCULAR; INTRAVENOUS at 08:12

## 2023-12-05 RX ADMIN — ACETAMINOPHEN 975 MG: 325 TABLET, FILM COATED ORAL at 09:12

## 2023-12-05 RX ADMIN — HYDROMORPHONE HYDROCHLORIDE 0.5 MG: 1 INJECTION, SOLUTION INTRAMUSCULAR; INTRAVENOUS; SUBCUTANEOUS at 12:12

## 2023-12-05 RX ADMIN — ROCURONIUM BROMIDE 5 MG: 10 INJECTION INTRAVENOUS at 08:12

## 2023-12-05 RX ADMIN — DEXAMETHASONE SODIUM PHOSPHATE 8 MG: 4 INJECTION, SOLUTION INTRA-ARTICULAR; INTRALESIONAL; INTRAMUSCULAR; INTRAVENOUS; SOFT TISSUE at 08:12

## 2023-12-05 RX ADMIN — INDIGO CARMINE 20 MG: 8 INJECTION, SOLUTION INTRAMUSCULAR; INTRAVENOUS at 11:12

## 2023-12-05 RX ADMIN — SODIUM CHLORIDE, POTASSIUM CHLORIDE, SODIUM LACTATE AND CALCIUM CHLORIDE: 600; 310; 30; 20 INJECTION, SOLUTION INTRAVENOUS at 02:12

## 2023-12-05 RX ADMIN — ROCURONIUM BROMIDE 45 MG: 10 INJECTION INTRAVENOUS at 09:12

## 2023-12-05 RX ADMIN — FAMOTIDINE 20 MG: 20 TABLET, FILM COATED ORAL at 07:12

## 2023-12-05 RX ADMIN — PHENYLEPHRINE HYDROCHLORIDE 200 MCG: 10 INJECTION INTRAVENOUS at 12:12

## 2023-12-05 RX ADMIN — ROCURONIUM BROMIDE 20 MG: 10 INJECTION INTRAVENOUS at 10:12

## 2023-12-05 RX ADMIN — MUPIROCIN: 20 OINTMENT TOPICAL at 08:12

## 2023-12-05 RX ADMIN — SUCCINYLCHOLINE CHLORIDE 160 MG: 20 INJECTION, SOLUTION INTRAMUSCULAR; INTRAVENOUS; PARENTERAL at 08:12

## 2023-12-05 RX ADMIN — GABAPENTIN 300 MG: 300 CAPSULE ORAL at 04:12

## 2023-12-05 RX ADMIN — PROPOFOL 150 MG: 10 INJECTION, EMULSION INTRAVENOUS at 08:12

## 2023-12-05 RX ADMIN — OXYCODONE HYDROCHLORIDE 5 MG: 5 TABLET ORAL at 08:12

## 2023-12-05 RX ADMIN — ACETAMINOPHEN 975 MG: 325 TABLET, FILM COATED ORAL at 04:12

## 2023-12-05 RX ADMIN — FAMOTIDINE 20 MG: 10 INJECTION, SOLUTION INTRAVENOUS at 08:12

## 2023-12-05 RX ADMIN — SODIUM CHLORIDE, POTASSIUM CHLORIDE, SODIUM LACTATE AND CALCIUM CHLORIDE: 600; 310; 30; 20 INJECTION, SOLUTION INTRAVENOUS at 12:12

## 2023-12-05 RX ADMIN — FENTANYL CITRATE 50 MCG: 50 INJECTION INTRAMUSCULAR; INTRAVENOUS at 09:12

## 2023-12-05 RX ADMIN — MIDAZOLAM HYDROCHLORIDE 2 MG: 1 INJECTION, SOLUTION INTRAMUSCULAR; INTRAVENOUS at 08:12

## 2023-12-05 RX ADMIN — ROCURONIUM BROMIDE 30 MG: 10 INJECTION INTRAVENOUS at 09:12

## 2023-12-05 RX ADMIN — FENTANYL CITRATE 50 MCG: 50 INJECTION INTRAMUSCULAR; INTRAVENOUS at 08:12

## 2023-12-05 NOTE — PROGRESS NOTES
GYNECOLOGY INPATIENT PROGRESS NOTE    Kely Sparks is a 53 y.o. female  s/p TLH with conversion to SULEMAN/BS/cysto 2/2 AUB-L. POD1/HD2.     Patient resting comfortably in bed, feeling well this morning. Pain is well controlled with current medications. Pt endorses tolerating regular without nausea or emesis. Passing flatus. Not yet ambulating. Shields removed at 6 am, does not yet have the urge to urinate.    Review of Systems  Denies fevers, chills, headaches, nausea, vomiting, dizziness, chest pain, or shortness of breath.     Medications      acetaminophen  975 mg Oral Q8H    famotidine (PF)  20 mg Intravenous Q12H    gabapentin  300 mg Oral TID    mupirocin   Nasal BID    polyethylene glycol  17 g Oral Daily         lactated ringers 125 mL/hr at 23 1432      bisacodyL, diphenhydrAMINE, diphenhydrAMINE, HYDROmorphone, ondansetron, oxyCODONE, prochlorperazine    Objective    Vitals:    23 1345 23 1400 23 1433 23 1608   BP: 95/64 101/63 95/61    BP Location:  Left arm     Patient Position:  Lying     Pulse: 80 77 74    Resp: 18 18  16   Temp:  96.8 °F (36 °C) 97.5 °F (36.4 °C)    TempSrc:  Temporal Oral    SpO2: 100% 100% 98%    Weight:           UOP: 500 mL of clear-yellow urine via Shields over the past 12 hours (~42 mL/hr)    Physical Exam    General: Alert and oriented, in no acute distress   Lungs: Clear to auscultation bilaterally   Heart:: Regular rate and rhythm   Abdomen Soft, appropriately tender post-op, active bowel sounds   Incision: Pressure bandage clean, dry, intact   DVT Evaluation: No cords or calf tenderness.  No significant calf/ankle edema.   Edema: None     Labs  Trended Lab Data:  Recent Labs   Lab 23  0721 23  1357   WBC 6.23  --    HGB 10.7* 11.0*   HCT 36.9* 37.8     --    MCV 88.5  --    RDW 18.1*  --        Assessment/Plan  53 y.o. POD# 1 s/p TLH with conversion to SULEMAN/BS/cysto 2/2 AUB-L. Doing well.    Neuro: Pain control  on current regimen.  Hemodynamics:  H/H at presentation 10.7/36.9 -> EBL 500mL -> post-op H/H 11/37.8 -> AM H/H 8.3/27.9. Appropriate for blood loss.  Asymptomatic, will monitor for sx of anemia and consider repeat CBC this afternoon.  Cr 0.91, at her baseline.  Hx of PE: will restart home eliquis 48 hrs post-op.  Cardiovascular: Vitals stable.  CHTN: blood pressures 90s-120s/50s-70s overnight.  No home meds.  Respiratory: sp02 % on r/a  GI/FEN: Continue regular diet  Renal/I&O:   UOP 500mL over 12 hours (~42 mL/hr), adequate.  Shields removed at 6 am, pending spontaneous void.  Infectious Disease: Afebrile  Endocrine: no acute issues.  Prophylaxis: SCDs, incentive spirometer.  Plan is to continue monitoring with plan to discharge once tolerating regular diet, pain well-controlled, passing flatus, ambulating, and voiding spontaneously.    Discussed with staff, Dr. Thibodeaux.    Zaid Ferris MD  [facility], PGY-2

## 2023-12-05 NOTE — ANESTHESIA PROCEDURE NOTES
Intubation    Date/Time: 12/5/2023 8:45 AM    Performed by: Lauryn Simmons CRNA  Authorized by: Kaitlin Montez MD    Intubation:     Induction:  Intravenous    Intubated:  Postinduction    Mask Ventilation:  Easy with oral airway    Attempts:  1    Attempted By:  Student    Method of Intubation:  Direct    Blade:  Madden 2    Laryngeal View Grade: Grade I - full view of cords      Difficult Airway Encountered?: No      Complications:  None    Airway Device:  Oral endotracheal tube    Airway Device Size:  7.5    Style/Cuff Inflation:  Cuffed (inflated to minimal occlusive pressure)    Inflation Amount (mL):  5    Tube secured:  21    Secured at:  The lips    Placement Verified By:  Capnometry    Complicating Factors:  None    Findings Post-Intubation:  BS equal bilateral and atraumatic/condition of teeth unchanged

## 2023-12-05 NOTE — TRANSFER OF CARE
Anesthesia Transfer of Care Note    Patient: Kely Sparks    Procedure(s) Performed: Procedure(s) (LRB):  HYSTERECTOMY,TOTAL,LAPAROSCOPIC,WITH SALPINGECTOMY (N/A)  CYSTOSCOPY (N/A)  HYSTERECTOMY, TOTAL, ABDOMINAL WITH SALPINGECTOMY (N/A)    Patient location: PACU    Anesthesia Type: general    Transport from OR: Transported from OR on room air with adequate spontaneous ventilation    Post pain: adequate analgesia    Post assessment: no apparent anesthetic complications    Post vital signs: stable    Level of consciousness: sedated    Nausea/Vomiting: no nausea/vomiting    Complications: none    Transfer of care protocol was followed      Last vitals: Visit Vitals  /80 (BP Location: Left arm, Patient Position: Lying)   Pulse 80   Temp (!) 34.8 °C (94.6 °F) (Tympanic)   Resp 18   Wt 111.6 kg (246 lb)   SpO2 96%   Breastfeeding No   BMI 46.48 kg/m²

## 2023-12-05 NOTE — BRIEF OP NOTE
[facility] - Periop Services  Brief Operative Note    SUMMARY     Surgery Date: 12/5/2023     Surgeon(s) and Role:     * Susie Thibodeaux MD - Primary  *Tracey Leone MD - Resident  *Venus Overton MD - Resident  *Zaid Ferris MD - Resident    Pre-op Diagnosis:    Uterine fibroids [D21.9]  Uteromegaly  Abnormal uterine bleeding  H/o endometrial hyperplasia    Post-op Diagnosis:    Same as above    Procedure(s) (LRB):  Laparoscopic lysis of adhesions  Total laparoscopic hysterectomy with conversion total abdominal hysterectomy   *22 modifier*  Bilateral salpingectomy  Cystoscopy    Anesthesia: General    Implants:  * No implants in log *    Operative Findings: See full operative note    Estimated Blood Loss: 500 mL  UOP: 450 mL urine via Shields, clear yellow at start of case and blue green tinged at end of case due to administration of methylene blue dye  IVF: 2000 mL crystalloid  Uterine weight: 844g    Estimated Blood Loss has been documented.         Specimens:   Specimen (24h ago, onward)       Start     Ordered    12/05/23 1256  Specimen to Pathology  RELEASE UPON ORDERING        References:    Click here for ordering Quick Tip   Question:  Release to patient  Answer:  Immediate    12/05/23 1253                    [id number]    Tracey Leone MD  [facility] PGY4

## 2023-12-05 NOTE — OP NOTE
[facility]  Surgery Department  Operative Note    SUMMARY     Date of Procedure: 2023     Procedure:   Laparoscopic lysis of adhesions  Total laparoscopic hysterectomy with conversion total abdominal hysterectomy   *22 modifier*  Bilateral salpingectomy  Cystoscopy    Surgeon(s) and Role:  * Susie Thibodeaux MD - Primary  *Tracey Leone MD - Resident  *Venus Overton MD - Resident  *Zaid Ferris MD - Resident    Pre-Operative Diagnosis:   Uterine fibroids [D21.9]  Uteromegaly  Abnormal uterine bleeding  H/o endometrial hyperplasia    Post-Operative Diagnosis:   Same as above   Pelvic adhesive disease     Anesthesia: General    Operative Findings (including complications, if any): EUA: Normal external genitalia without lesion. Uterus palpated approximately 2cm above umbilicus, mobile, anterior, irregular contour. No adnexal masses or fullness. Vaginal vault without lesion, discharge, or bleeding. Cervix visualized without lesion or erythema, pulled high in pelvis due to large uterus.    Laparoscopically: Liver with Vik Chris Edgardo adhesions. Normal stomach edge visualized. Bowel visualized and normal without erythema or edema. Thin filmy omental adhesions to the anterior abdominal wall, extending from the inferior edge of the liver, down to the fundus of the uterus. Uterus enlarged, irregular contour, and densely adhered to the anterior abdominal wall, consistent with prior  sections. Large, approximately 10cm posterior cervical fibroid, as well as multiple other fibroids ranging in size from 2-8cm. Normal ovaries; bilateral fallopian tubes with evidence of prior occlusion. Anterior cul de sac obliterated due to scar. Due to inability to visualize the colpotomy location, the case was converted to open. The adhesiolysis and hysterectomy portions were noted to be very difficult due to patient's cervical fibroid and adhesions; greater than 60 extra minutes spent on  dissection.   Cystoscopy: Urethra normal without polyp or lesion. Bladder mucosa visualized globally without lesion, petechiae, diverticula, or stones in the trigone, inlet, and dome. Bilateral ureteral orifices visualized with strong efflux of urine and without lesion; blue urine due to administration of methylene blue dye.    Technique:   Patient was pre-medicated per ERAS protocol and taken to the OR with IVF running. Ancef 2 grams IV were administered for infection prophylaxis. SCDs were placed to bilateral lower extremities for DVT prophylaxis. She was placed in dorsal supine position. General endotracheal anesthesia was then administered without incident and an orogastric tube placed in usual fashion. The patient was positioned in dorsal lithotomy position using Remi stirrups, with careful attention to leg positioning. Both arms were tucked in  position with liberal padding of the elbows and hands. A Bovie pad was placed on the right side. A timeout was performed.  A pelvic exam was performed and noted the above findings. The patient was then prepped and draped in the usual sterile fashion.     Attention was paid to the vagina where a sutton catheter was inserted into the bladder. A speculum was placed in the vagina. The cervix was identified and grasped with a single tooth tenaculum. Attempt was made to sound the uterus, however unable to sound due to large fibroid/mass. Decision was made to delay placement of manipulator until under laparoscopic guidance.      Attention was then paid to the laparoscopic portion of the procedure. A 5-mm incision was made with a scalpel 8cm lateral to the umbilicus on the left side of the patient. With direct visualization, a 5-mm trocar introduced into the abdomen. Upon confirmation of entry into the abdomen, low-flow CO2 gas was initiated with confirmation of low pressure and thus high-flow was initiated to a level of 15 mmHg. No visceral or vascular injury occurred  with entry into abdomen.     A survey of the upper abdomen was performed with the above-noted findings and the patient was then placed in steep Trendelenburg revealing the above-noted findings. Four additional 5mm trocars were then placed under direct visualization, 1 supraumbilical, 1 in the right mid-abdomen, and two in the bilateral lower quadrants. Trocar placement was only available after extensive adhesiolysis.    Findings were noted as above. Filmy omental adhesions to the anterior abdominal wall were taken down with the Enseal device. Good hemostasis was noted and good view of the uterus was achieved. The uterine manipulator was then placed. The uterus was sounded to 10cm. The CELSA Arch manipulator was assembled and inserted into the uterus. The intrauterine balloon was inflated with 5cc of saline. The cervical cup was placed around the cervix.      Attention was turned to the hysterectomy. The ureters were noted to be vermiculating bilaterally at the level of the pelvic brim. The right round ligament was grasped, ligated and transected. The right utero-ovarian ligament was grasped, ligated and transected. Attempt was made to locate the upper edge of the KOH ring, but unsuccessful due to large posterior cervical fibroid obstructing view. Attention was then turned to the left, where the left round ligament was grasped, ligated and transected. The left uteroovarian ligament was then grasped, ligated and transected.     Attention was then turned to the anterior leaf of the broad and development of the bladder flap. The uterus was also densely adhered to the anterior abdominal wall as noted above. The prevesicular fatty tissue was bluntly dissected to expose a clear plane between the uterus and bladder. This plane was carefully followed and using blunt and sharp dissection, the adhesions were dissected off the uterus to expose the site of anterior colpotomy and the pubocervical fascia. The atraumatic graspers  were used on the bladder to provide counter traction and the bladder was eventually dropped down and out of the proximity of the uterine blood supply and off of the planned colpotomy site. The bladder and uterine adhesions were dissected away on the right side in a similar manner. This portion of the procedure was noted to be very difficult and required greater than 60 minutes of careful dissection. At this point, the ureters were again identified, vermiculating on the medial leaf of the broad ligament. The left uterine artery was noted on the side of the posterior fibroid and was carefully ligated. The right uterine artery was identified and ligated.     Again, attempt was made to visualize the KOH ring circumferentially and was unsuccessful due to large fibroids. Manipulation of the uterus was noted to be very difficult due to large bulky nature occupying space in the pelvis. Decision was made to convert to open hysterectomy to safely complete the procedure.     A Pfannenstiel skin incision was made 2 cm above the pubic symphysis with the knife and then carried down to the underlying fascia with the bovie. Fascia was incised in the midline and extended laterally with curved Mayos. The underlying rectus and pyramidalis muscles were then dissected off the fascia, and  in the midline. The peritoneum identified and entered bluntly. The peritoneal incision was then extended laterally. The uterus was markedly evident upon entering the peritoneal cavity and was exteriorized. The O'Ishmael-O'Otto retractor was then placed.  The bowel blade and bladder blade were placed respectively. with adequate visualization achieved, the uterine arteries were again ligated, at the level of the internal os above the cervical os, then transected, and dissected safely away from the uterus and lateral fibroids using the Enseal and Bernice clamps.     Decision was then made to amputate the uterus from the cervix and cervical  fibroid. The uterus was amputated with the scalpel and handed off. Myomectomy of the underlying cervical fibroid was then performed first with sharp, and then with blunt dissection. The capsule was peeled off the underlying fibroid, which was detached and handed off. With pressure from the KOH ring, the colpotomy site was then well visualized. The anterior colpotomy was made carefully with the bovie on the upper edge of the KOH ring. The colpotomy was then completed with curved Ferraro scissors. The cervix and remainder of the uterus were handed off.     The vagina was closed using a 0 vicryl in a running locked fashion ensuring incorporation of the peritoneum into the closure. The cuff was carefully inspected and noted to be hemostatic. The abdomen was then irrigated and the pedicles re-inspected. Bleeding was noted from the peritoneal edge of the right retroperitoneal dissection. Sheets of surgicel were placed in both right and left areas of retroperitoneal dissection.     Attention was then paid to the cystoscopy. Indigo carmine was administered by anestheisa. A 70 degree cystoscope was introduced into the bladder without difficulty. The bladder was inspected and noted to be intact without evidence of trauma. There was noted to be efflux of urine from bilateral ureteral orifices. The cystoscope was removed and a sutton catheter was inserted again.    Attention was returned to the abdomen, where the pedicles were re-inspected and noted to be hemostatic. The O'Ishmael-O'Otto retractor was removed, and the left fallopian tube was grasped with a Collins. The fallopian tube was amputated with the Enseal device. Bleeding was noted and a figure of 8 suture was placed for hemostasis. The salpingectomy was performed in a similar manner on the right. Surgicel powder was applied to the pedicles for added hemostasis.     The laps and all instruments were removed from the abdomen. All specimens were collected, labeled and sent  to pathology for analysis. The remaining trocars were deflated and removed. The fascia was closed using a #1 PDS running suture from either angle. The subcutaneous layer was thoroughly inspected and closed with vicryl in a running fashion. The Pfannenstiel skin incision was reapproximated with 4-0 vicryl and a dressing was applied. The skin incisions were closed using 4-0 monocryl. The incisions were then injected with 20cc of lidocaine. The laparoscopic port site skin incisions covered with dermabond. A vaginal sweep was performed and revealed no retained instruments.     A total of approximately 2 hours was needed beyond usual time due to patient's anatomy and adhesions. All instrument, sponge counts were correct times two. The patient tolerated the procedure well. She was extubated and transferred to recovery in stable condition.      Dr. Thibodeaux was present and scrubbed for the entirety of the procedure.    Estimated Blood Loss: 500 mL  UOP: 450 mL urine via Shields, clear yellow at start of case and blue green tinged at end of case due to administration of indigo carmine dye  IVF: 2000 mL crystalloid  Uterine weight: 844g           Implants: * No implants in log *    Specimens:   Specimen (24h ago, onward)       Start     Ordered    12/05/23 1256  Specimen to Pathology  RELEASE UPON ORDERING        References:    Click here for ordering Quick Tip   Question:  Release to patient  Answer:  Immediate    12/05/23 1256                          Condition: Good    Disposition: PACU - hemodynamically stable.    Tracey Leone MD  [facility] PGY4

## 2023-12-05 NOTE — INTERVAL H&P NOTE
H&P Interval Update    Patient seen and evaluated by myself and the staff attending this morning. There have been no changes since her preop visit with Dr. Overton (see below). The patient is able to express in her own words the procedure undergoing on today and wishes to proceed. No changes in PMH/ED visits/Hospital admissions since last visit (H&P noted below). NPO since yesterday. She is here today with her daughter and mother, who she would like us to contact after the procedure.     Vitals:    12/05/23 0703 12/05/23 0706   BP:  121/76   Pulse:  85   Temp:  98.6 °F (37 °C)   TempSrc:  Oral   SpO2:  98%   Weight: 111.6 kg (246 lb)      Gen: Alert, oriented, no distress  CV: RRR  Pulm: no increased WOB  Abdomen: Soft and nontender    To the OR for procedure below.         Active Hospital Problems    Diagnosis  POA    *Fibroids [D21.9]  Yes      Resolved Hospital Problems   No resolved problems to display.

## 2023-12-05 NOTE — PLAN OF CARE
12/05/23 1437   Discharge Assessment   Assessment Type Discharge Planning Assessment   Confirmed/corrected address, phone number and insurance Yes   Confirmed Demographics Correct on Facesheet   Source of Information family   When was your last doctors appointment?   (Sera Cherelle)   Reason For Admission Fibroids, Abnormal uterine bleeding   People in Home child(margaret), adult   Facility Arrived From: Home   Do you expect to return to your current living situation? Yes   Do you have help at home or someone to help you manage your care at home? Yes   Who are your caregiver(s) and their phone number(s)? Cathy Sparks (Daughter) [phone]; Jamil Sparks (Son) [phone]   Prior to hospitilization cognitive status: Alert/Oriented   Current cognitive status: Unable to Assess   Home Accessibility not wheelchair accessible;stairs to enter home   Number of Stairs, Main Entrance four   Stair Railings, Main Entrance railings safe and in good condition;railing on left side (ascending)   Home Layout Able to live on 1st floor   Equipment Currently Used at Home none   Readmission within 30 days? No   Patient currently being followed by outpatient case management? No   Do you currently have service(s) that help you manage your care at home? No   Do you take prescription medications? Yes  ([place] [facility])   Do you have prescription coverage? Yes   Coverage Generic Commercial   Do you have any problems affording any of your prescribed medications? No   Is the patient taking medications as prescribed? yes   Who is going to help you get home at discharge? Family   How do you get to doctors appointments? family or friend will provide   Are you on dialysis? 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

## 2023-12-05 NOTE — ANESTHESIA POSTPROCEDURE EVALUATION
Anesthesia Post Evaluation    Patient: Kely Sparks    Procedure(s) Performed: Procedure(s) (LRB):  HYSTERECTOMY,TOTAL,LAPAROSCOPIC,WITH SALPINGECTOMY (N/A)  CYSTOSCOPY (N/A)  HYSTERECTOMY, TOTAL, ABDOMINAL WITH SALPINGECTOMY (N/A)    Final Anesthesia Type: general      Patient location during evaluation: med/surg floor  Post-procedure vital signs: reviewed and stable  Airway patency: patent      Anesthetic complications: no      Cardiovascular status: hemodynamically stable  Respiratory status: spontaneous ventilation  Follow-up not needed.              Vitals Value Taken Time   BP 95/61 12/05/23 1433   Temp 36.4 °C (97.5 °F) 12/05/23 1433   Pulse 74 12/05/23 1433   Resp 18 12/05/23 1400   SpO2 98 % 12/05/23 1433         Event Time   Out of Recovery 14:20:00         Pain/Herminia Score: Pain Rating Prior to Med Admin: 6 (12/5/2023  1:45 PM)  Herminia Score: 10 (12/5/2023  2:07 PM)

## 2023-12-06 VITALS
SYSTOLIC BLOOD PRESSURE: 93 MMHG | WEIGHT: 246 LBS | TEMPERATURE: 98 F | HEART RATE: 97 BPM | OXYGEN SATURATION: 98 % | RESPIRATION RATE: 19 BRPM | HEIGHT: 61 IN | DIASTOLIC BLOOD PRESSURE: 55 MMHG | BODY MASS INDEX: 46.44 KG/M2

## 2023-12-06 PROBLEM — D25.9 FIBROID OF CERVIX: Status: ACTIVE | Noted: 2023-12-06

## 2023-12-06 PROBLEM — Z53.31 LAPAROSCOPIC SURGICAL PROCEDURE CONVERTED TO OPEN PROCEDURE: Status: ACTIVE | Noted: 2022-06-09

## 2023-12-06 PROBLEM — N73.6 PELVIC ADHESIVE DISEASE: Status: ACTIVE | Noted: 2023-12-06

## 2023-12-06 PROBLEM — Z98.891 HISTORY OF CESAREAN DELIVERY: Status: ACTIVE | Noted: 2023-12-06

## 2023-12-06 PROBLEM — N93.9 ABNORMAL UTERINE BLEEDING: Status: ACTIVE | Noted: 2023-07-24

## 2023-12-06 LAB
ALBUMIN SERPL-MCNC: 2.8 G/DL (ref 3.5–5)
ALBUMIN/GLOB SERPL: 0.9 RATIO (ref 1.1–2)
ALP SERPL-CCNC: 44 UNIT/L (ref 40–150)
ALT SERPL-CCNC: 11 UNIT/L (ref 0–55)
AST SERPL-CCNC: 22 UNIT/L (ref 5–34)
BASOPHILS # BLD AUTO: 0.02 X10(3)/MCL
BASOPHILS NFR BLD AUTO: 0.1 %
BILIRUB SERPL-MCNC: 0.5 MG/DL
BUN SERPL-MCNC: 17.5 MG/DL (ref 9.8–20.1)
CALCIUM SERPL-MCNC: 8.5 MG/DL (ref 8.4–10.2)
CHLORIDE SERPL-SCNC: 109 MMOL/L (ref 98–107)
CO2 SERPL-SCNC: 24 MMOL/L (ref 22–29)
CREAT SERPL-MCNC: 0.91 MG/DL (ref 0.55–1.02)
EOSINOPHIL # BLD AUTO: 0 X10(3)/MCL (ref 0–0.9)
EOSINOPHIL NFR BLD AUTO: 0 %
ERYTHROCYTE [DISTWIDTH] IN BLOOD BY AUTOMATED COUNT: 17.7 % (ref 11.5–17)
GFR SERPLBLD CREATININE-BSD FMLA CKD-EPI: >60 MLS/MIN/1.73/M2
GLOBULIN SER-MCNC: 3.1 GM/DL (ref 2.4–3.5)
GLUCOSE SERPL-MCNC: 108 MG/DL (ref 74–100)
HCT VFR BLD AUTO: 27.9 % (ref 37–47)
HGB BLD-MCNC: 8.3 G/DL (ref 12–16)
IMM GRANULOCYTES # BLD AUTO: 0.04 X10(3)/MCL (ref 0–0.04)
IMM GRANULOCYTES NFR BLD AUTO: 0.3 %
LYMPHOCYTES # BLD AUTO: 1.46 X10(3)/MCL (ref 0.6–4.6)
LYMPHOCYTES NFR BLD AUTO: 10.4 %
MCH RBC QN AUTO: 25.9 PG (ref 27–31)
MCHC RBC AUTO-ENTMCNC: 29.7 G/DL (ref 33–36)
MCV RBC AUTO: 86.9 FL (ref 80–94)
MONOCYTES # BLD AUTO: 1.47 X10(3)/MCL (ref 0.1–1.3)
MONOCYTES NFR BLD AUTO: 10.4 %
NEUTROPHILS # BLD AUTO: 11.08 X10(3)/MCL (ref 2.1–9.2)
NEUTROPHILS NFR BLD AUTO: 78.8 %
NRBC BLD AUTO-RTO: 0 %
PLATELET # BLD AUTO: 247 X10(3)/MCL (ref 130–400)
PMV BLD AUTO: 12.2 FL (ref 7.4–10.4)
POCT GLUCOSE: 110 MG/DL (ref 70–110)
POTASSIUM SERPL-SCNC: 4.9 MMOL/L (ref 3.5–5.1)
PROT SERPL-MCNC: 5.9 GM/DL (ref 6.4–8.3)
RBC # BLD AUTO: 3.21 X10(6)/MCL (ref 4.2–5.4)
SODIUM SERPL-SCNC: 140 MMOL/L (ref 136–145)
WBC # SPEC AUTO: 14.07 X10(3)/MCL (ref 4.5–11.5)

## 2023-12-06 PROCEDURE — 25000003 PHARM REV CODE 250: Performed by: STUDENT IN AN ORGANIZED HEALTH CARE EDUCATION/TRAINING PROGRAM

## 2023-12-06 PROCEDURE — 85025 COMPLETE CBC W/AUTO DIFF WBC: CPT | Performed by: STUDENT IN AN ORGANIZED HEALTH CARE EDUCATION/TRAINING PROGRAM

## 2023-12-06 PROCEDURE — 80053 COMPREHEN METABOLIC PANEL: CPT | Performed by: STUDENT IN AN ORGANIZED HEALTH CARE EDUCATION/TRAINING PROGRAM

## 2023-12-06 PROCEDURE — 94761 N-INVAS EAR/PLS OXIMETRY MLT: CPT

## 2023-12-06 RX ORDER — OXYCODONE HYDROCHLORIDE 5 MG/1
5 TABLET ORAL EVERY 4 HOURS PRN
Qty: 20 TABLET | Refills: 0 | Status: SHIPPED | OUTPATIENT
Start: 2023-12-06

## 2023-12-06 RX ADMIN — FAMOTIDINE 20 MG: 10 INJECTION, SOLUTION INTRAVENOUS at 08:12

## 2023-12-06 RX ADMIN — OXYCODONE HYDROCHLORIDE 5 MG: 5 TABLET ORAL at 06:12

## 2023-12-06 RX ADMIN — POLYETHYLENE GLYCOL 3350 17 G: 17 POWDER, FOR SOLUTION ORAL at 08:12

## 2023-12-06 RX ADMIN — ACETAMINOPHEN 975 MG: 325 TABLET, FILM COATED ORAL at 09:12

## 2023-12-06 RX ADMIN — GABAPENTIN 300 MG: 300 CAPSULE ORAL at 08:12

## 2023-12-06 RX ADMIN — OXYCODONE HYDROCHLORIDE 5 MG: 5 TABLET ORAL at 01:12

## 2023-12-06 RX ADMIN — OXYCODONE HYDROCHLORIDE 5 MG: 5 TABLET ORAL at 02:12

## 2023-12-06 RX ADMIN — MUPIROCIN: 20 OINTMENT TOPICAL at 08:12

## 2023-12-06 NOTE — DISCHARGE SUMMARY
[facility]  Obstetrics & Gynecology  Discharge Summary    Patient Name: Kely Sparks  MRN: [id number]  Admission Date: 2023  Hospital Length of Stay: 1 days  Discharge Date and Time:  2023 12:57 PM  Attending Physician: Susie Thibodeaux MD   Discharging Provider: Zaid Ferris MD  Primary Care Provider: Sera Villarreal FNP    HPI/Hospital Course: 52 y/o  with AUB-L presented for and underwent scheduled TLH with conversion to SULEMAN/BS/cystoscopy. She was monitored overnight given major abdominal surgery. She progressed appropriately post-op with no complications. She was passing flatus, tolerating regular diet, ambulating, voiding spontaneously, and with pain well-controlled on POD#1/HD#2. Determined to be meeting all post-op milestones for discharge.    Procedure(s) (LRB):  HYSTERECTOMY,TOTAL,LAPAROSCOPIC,WITH SALPINGECTOMY (N/A)  CYSTOSCOPY (N/A)  HYSTERECTOMY, TOTAL, ABDOMINAL WITH SALPINGECTOMY (N/A)     Consults: None    Significant Diagnostic Studies: Labs: CMP   Recent Labs   Lab 23  0345      K 4.9   CO2 24   BUN 17.5   CREATININE 0.91   CALCIUM 8.5   ALBUMIN 2.8*   BILITOT 0.5   ALKPHOS 44   AST 22   ALT 11    and CBC   Recent Labs   Lab 23  0721 23  1357 23  0345   WBC 6.23  --  14.07*   HGB 10.7* 11.0* 8.3*   HCT 36.9* 37.8 27.9*     --  247       Pending Diagnostic Studies:       Procedure Component Value Units Date/Time    Specimen to Pathology [[id number]] Collected: 23 09    Order Status: Sent Lab Status: In process Updated: 23 111    Specimen: Tissue from Uterus           Final Active Diagnoses:    Diagnosis Date Noted POA    PRINCIPAL PROBLEM:  Fibroids [D21.9] 2022 Yes    Abnormal uterine bleeding (AUB) [N93.9] 2022 Yes      Problems Resolved During this Admission:        Discharged Condition: stable    Disposition: Home or Self Care    Follow Up: As scheduled on  and  1/19.    Patient Instructions:      Diet Adult Regular     Pelvic Rest   Order Comments: Nothing in the vagina for 6 weeks.  No heavy lifting (nothing heavier than a gallon of milk).  No soaking in baths, jacuzzis, or hot tubs for 6 weeks, only regular showers for 6 weeks.  No driving while taking narcotic medications.     Notify your health care provider if you experience any of the following:  temperature >100.4     Notify your health care provider if you experience any of the following:  persistent nausea and vomiting or diarrhea     Notify your health care provider if you experience any of the following:  severe uncontrolled pain     Notify your health care provider if you experience any of the following:  redness, tenderness, or signs of infection (pain, swelling, redness, odor or green/yellow discharge around incision site)     Notify your health care provider if you experience any of the following:  difficulty breathing or increased cough     Notify your health care provider if you experience any of the following:  severe persistent headache     Notify your health care provider if you experience any of the following:  worsening rash     Notify your health care provider if you experience any of the following:  persistent dizziness, light-headedness, or visual disturbances     Notify your health care provider if you experience any of the following:  increased confusion or weakness     Remove dressing in 24 hours     Medications:  Reconciled Home Medications:      Medication List        START taking these medications      acetaminophen 500 MG tablet  Commonly known as: TYLENOL  Take 2 tablets (1,000 mg total) by mouth every 6 (six) hours.     ibuprofen 600 MG tablet  Commonly known as: ADVIL,MOTRIN  Take 1 tablet (600 mg total) by mouth every 6 (six) hours.     oxyCODONE 5 MG immediate release tablet  Commonly known as: ROXICODONE  Take 1 tablet (5 mg total) by mouth every 4 (four) hours as needed for Pain.      senna 8.6 mg tablet  Commonly known as: SENNA  Take 2 tablets by mouth every evening. for 14 days            CONTINUE taking these medications      apixaban 5 mg Tab  Commonly known as: ELIQUIS  Take 1 tablet (5 mg total) by mouth 2 (two) times daily.     ferrous sulfate 325 (65 FE) MG EC tablet  Take 1 tablet (325 mg total) by mouth once daily. Take with Vit C 500 mg with iron med to increase absorption.     methocarbamoL 750 MG Tab  Commonly known as: ROBAXIN  Take 1 tablet (750 mg total) by mouth every evening.            Zaid Ferris MD  [facility], PGY-2

## 2023-12-06 NOTE — DISCHARGE INSTRUCTIONS
Pt has been given discharge and medication instructions and has stated understanding. Pt is being accompanied by staff to family vehicle.

## 2023-12-08 LAB
ABO + RH BLD: NORMAL
ABO + RH BLD: NORMAL
BLD PROD TYP BPU: NORMAL
BLD PROD TYP BPU: NORMAL
BLOOD UNIT EXPIRATION DATE: NORMAL
BLOOD UNIT EXPIRATION DATE: NORMAL
BLOOD UNIT TYPE CODE: 5100
BLOOD UNIT TYPE CODE: 5100
CROSSMATCH INTERPRETATION: NORMAL
CROSSMATCH INTERPRETATION: NORMAL
DISPENSE STATUS: NORMAL
DISPENSE STATUS: NORMAL
UNIT NUMBER: NORMAL
UNIT NUMBER: NORMAL

## 2023-12-15 ENCOUNTER — OFFICE VISIT (OUTPATIENT)
Dept: GYNECOLOGY | Facility: CLINIC | Age: 53
End: 2023-12-15
Payer: COMMERCIAL

## 2023-12-15 VITALS
TEMPERATURE: 98 F | HEIGHT: 60 IN | SYSTOLIC BLOOD PRESSURE: 134 MMHG | OXYGEN SATURATION: 100 % | HEART RATE: 77 BPM | BODY MASS INDEX: 47.87 KG/M2 | WEIGHT: 243.81 LBS | DIASTOLIC BLOOD PRESSURE: 84 MMHG

## 2023-12-15 DIAGNOSIS — Z90.710 STATUS POST HYSTERECTOMY: Primary | ICD-10-CM

## 2023-12-15 PROCEDURE — 99213 OFFICE O/P EST LOW 20 MIN: CPT | Mod: PBBFAC

## 2023-12-15 NOTE — PROGRESS NOTES
[facility] OB[facility] CLINIC NOTE  [facility]  [address]  [place] [address]  Phone: [phone]  Fax: [phone]    Postoperative Follow-Up    Subjective:      Kely Sparks is a 53 y.o.  with AUB-L presented for and underwent scheduled TLH with conversion to SULEMAN/BS/cystoscopy who presents to the clinic 10 days post op.     Eating a regular diet without difficulty and with normal bowel movements. Patient is not complaining of pain currently. No episodes of vaginal bleeding.    1. Do you have more pain in your abdomen (lower belly) today than you did yesterday?Yes - feels more bloated   2. Are the drugs you are taking for pain keeping the pain under control? Yes   3. Do you have pain in your kidney area (i.e. around your lower back ribs)? No   4. Are you having any chest pain, upper back pain, or pain in your shoulder tips?No   5. Are you short of breath or having any difficulties breathing?No   6. Do you have a cough?No   7. Do you have any swelling in either one--or both--of your legs? No   8. Do you have a fever, chills, or night sweats?No   9. Are you lightheaded or dizzy, or have you fainted?No   10. Are you having any problems with your incision? For example, is it red, painful, or swollen? Oozing any pus? Opening up?No   11. Are you having heavy vaginal bleeding? For example, are you soaking a pad every hour?No  12. Is there excessive or foul discharge from your vagina?No   13. Have you thrown up in the last 24 h? No   14. Are you passing gas rectally? Yes   15. Have you had a bowel movement in the last 24 h? Yes   16. Are you having any diarrhea, or frequent, loose bowel movements?No   17. Is there urine or feces (stool) leaking from your vagina?No   18. Are you having any problems when you empty your bladder?No   19. Since your surgery, have you had any problems related to your medication?No   20. How many opioids have you taken? 7 tablets   21. Day #1 meds?Tylenol, Oxicodone Day #2  meds?Tylenol, oxicodone Day#3 meds?tylenol, oxicodone  22. Access to reliable immediate internet?Yes     Patient's medications, allergies, past medical, surgical, social and family histories were reviewed and updated as appropriate.    Operative Findings:  EBL: 500 mL  Uterus: 780g    EUA: Normal external genitalia without lesion. Uterus palpated approximately 2cm above umbilicus, mobile, anterior, irregular contour. No adnexal masses or fullness. Vaginal vault without lesion, discharge, or bleeding. Cervix visualized without lesion or erythema, pulled high in pelvis due to large uterus.    Laparoscopically: Liver with Vik Chris Edgardo adhesions. Normal stomach edge visualized. Bowel visualized and normal without erythema or edema. Thin filmy omental adhesions to the anterior abdominal wall, extending from the inferior edge of the liver, down to the fundus of the uterus. Uterus enlarged, irregular contour, and densely adhered to the anterior abdominal wall, consistent with prior  sections. Large, approximately 10cm posterior cervical fibroid, as well as multiple other fibroids ranging in size from 2-8cm. Normal ovaries; bilateral fallopian tubes with evidence of prior occlusion. Anterior cul de sac obliterated due to scar. Due to inability to visualize the colpotomy location, the case was converted to open. The adhesiolysis and hysterectomy portions were noted to be very difficult due to patient's cervical fibroid and adhesions; greater than 60 extra minutes spent on dissection.   Cystoscopy: Urethra normal without polyp or lesion. Bladder mucosa visualized globally without lesion, petechiae, diverticula, or stones in the trigone, inlet, and dome. Bilateral ureteral orifices visualized with strong efflux of urine and without lesion; blue urine due to administration of methylene blue dye.    Review of Systems  Denies fevers, chills, headache, blurry vision, nausea, vomiting, dizziness, or syncope.   Denies  chest pain, shortness of breath, RUQ pain, or calf pain.     Objective:     Vitals:    12/15/23 0824   BP: 134/84   BP Location: Right arm   Patient Position: Sitting   BP Method: Large (Automatic)   Pulse: 77   Temp: 97.9 °F (36.6 °C)   TempSrc: Oral   SpO2: 100%   Weight: 110.6 kg (243 lb 12.8 oz)   Height: 5' (1.524 m)     Physical Exam:     General: alert and oriented, in no acute distress  Lungs: clear to auscultation bilaterally, no conversational dyspnea  Heart: regular rate and rhythm  Abdomen: Soft, non-distended, non tender to palpation, no involuntary guarding, no rebound tenderness  Incision Sites: Laparoscopic incisions x 5, pfannenstiel incision all clean, dry, and intact   Extremities: Normal, atraumatic, non-edematous, No cords or calf tenderness, No significant calf/ankle edema    Pathology 23:   Uterus, cervix, fallopian tube, hysterectomy:     - Endometrium: Proliferative endometrium with benign hyperplastic polyp.  - Myometrium: Adenomyosis. Leiomyomata.  - Cervix: No significant changes.  - One Fallopian tube: No significant changes.        Assessment:     Kely Sparks is a 53 y.o.  with AUB-L presented for and underwent scheduled TLH with conversion to SULEMAN/BS/cystoscopy who presents to the clinic 10 days post op. Doing well post-operatively.  Operative findings again reviewed. Pathology report discussed.     Plan:     Continue any current medications.  Wound care discussed.  Activity restrictions: Nothing per vagina, no heavy lifting   Anticipated return to work: At 4 weeks post op   Follow up: at 6 week post op visit     Patient and plan were discussed with Dr. Thibodeaux.    Venus Overton MD   [facility] PGY-3

## 2023-12-18 PROBLEM — Z13.6 SCREENING FOR HYPERTENSION: Status: RESOLVED | Noted: 2023-02-22 | Resolved: 2023-12-18

## 2023-12-18 PROBLEM — Z00.00 WELL ADULT EXAM: Status: RESOLVED | Noted: 2022-10-26 | Resolved: 2023-12-18

## 2024-01-19 ENCOUNTER — OFFICE VISIT (OUTPATIENT)
Dept: GYNECOLOGY | Facility: CLINIC | Age: 54
End: 2024-01-19
Payer: COMMERCIAL

## 2024-01-19 ENCOUNTER — TELEPHONE (OUTPATIENT)
Dept: GYNECOLOGY | Facility: CLINIC | Age: 54
End: 2024-01-19

## 2024-01-19 DIAGNOSIS — Z90.710 S/P LAPAROSCOPIC HYSTERECTOMY: Primary | ICD-10-CM

## 2024-01-19 PROCEDURE — 99211 OFF/OP EST MAY X REQ PHY/QHP: CPT | Mod: PBBFAC

## 2024-01-19 NOTE — PROGRESS NOTES
Subjective:      Postoperative Follow-up     Kely Sparks is a 53 y.o.  who presents to the clinic 6 weeks status post total laparoscopic hysterectomy for fibroids. Eating a regular diet without difficulty. Bowel movements are normal. The patient is not having any pain.    Patient's medications, allergies, past medical, surgical, social and family histories were reviewed and updated as appropriate.      Review of Systems  Pertinent items are noted in HPI.     PATHOLOGY: Uterus, cervix, fallopian tube, hysterectomy:     - Endometrium: Proliferative endometrium with benign hyperplastic polyp.  - Myometrium: Adenomyosis. Leiomyomata.  - Cervix: No significant changes.  - One Fallopian tube: No significant changes.              Objective:      There were no vitals taken for this visit.  General:  alert,appears stated age,cooperative   Abdomen: soft,bowel sounds active,non-tender   Incision:    Speculum:  BME:   healing well,no drainage,no erythema,no hernia,no seroma,no swelling,no dehiscence,incision well approximated  Normal vagina with physiologic discharge  Cuff intact, healed well       Assessment:       53 y.o.  s/p TLH BS cysto with conversion to SULEMAN doing well post-operatively.  Operative findings again reviewed. Pathology report discussed.  1. S/P laparoscopic hysterectomy                Plan:        1. Continue any current medications.  2. Wound care discussed.  3. Activity restrictions: none  4. Anticipated return to work: now.  5. Follow up: 1  year  for wellness with NP.    Problem List Items Addressed This Visit          Renal/    S/P laparoscopic hysterectomy - Primary     Did well postop.  Benign pathology. RTC 1 year        Susie Thibodeaux MD  [facility]

## 2024-01-19 NOTE — LETTER
January 19, 2024      [facility]  [address] [address]  Phone: [phone]       Patient: Kely Sparks   YOB: 1970  Date of Visit: 01/19/2024    To Whom It May Concern:    Leta Sparks  was at [facility] on 01/19/2024. The patient may return to work/school on 01/22/2024 with no restrictions. If you have any questions or concerns, or if I can be of further assistance, please do not hesitate to contact me.    Sincerely,  GIUSEPPE Thibodeaux MD

## 2024-01-19 NOTE — TELEPHONE ENCOUNTER
First I called [facility] and spoke to Mike and got appt for 2/15/24 at 1:40pm and called patient. I gave patient instructions on portal and requesting notifications on sooner appts as well.   I did send extended message to Sera Villarreal NP (high priority in basket with cc to dr welch) and gave details of need of appt to f/u eliquis need post work up with gyn.  Informed her of appt but that patient would need asap and requested that she reach out, perhaps, and get patient in this month as stated in last [facility] [place].  Patient will try to move appt as well.   She was extremely grateful for my help and will call back if needs.----- Message from Lorrie Valdez RN sent at 1/19/2024 10:41 AM CST -----  Sera,  Please see Dr Welch's note below.  Please let me know when you get her scheduled so that I can let Dr Welch know.  ----- Message -----  From: Susie Welch MD  Sent: 1/19/2024   8:49 AM CST  To: [facility] Clinical Support Staff    She should have had a follow up appt with  for her PE but she did not get a call.  Please call that clinic to schedule one and let her know.  Thanks

## 2024-01-19 NOTE — LETTER
January 19, 2024      [facility]  [address] [address]  Phone: [phone]       Patient: Kely Sparks   YOB: 1970  Date of Visit: 01/19/2024    To Whom It May Concern:    Leta Sparks  was at [facility] on 01/19/2024. The patient may return to work/school on 01/2024 with no restrictions. If you have any questions or concerns, or if I can be of further assistance, please do not hesitate to contact me.    Sincerely,    GIUSEPPE Thibodeaux MD

## 2024-02-02 ENCOUNTER — OFFICE VISIT (OUTPATIENT)
Dept: INTERNAL MEDICINE | Facility: CLINIC | Age: 54
End: 2024-02-02
Payer: COMMERCIAL

## 2024-02-02 VITALS
HEIGHT: 60 IN | DIASTOLIC BLOOD PRESSURE: 82 MMHG | BODY MASS INDEX: 49.67 KG/M2 | TEMPERATURE: 98 F | HEART RATE: 81 BPM | SYSTOLIC BLOOD PRESSURE: 136 MMHG | WEIGHT: 253 LBS | RESPIRATION RATE: 18 BRPM

## 2024-02-02 DIAGNOSIS — Z90.710 S/P LAPAROSCOPIC HYSTERECTOMY: ICD-10-CM

## 2024-02-02 DIAGNOSIS — I26.99 PULMONARY EMBOLISM, UNSPECIFIED CHRONICITY, UNSPECIFIED PULMONARY EMBOLISM TYPE, UNSPECIFIED WHETHER ACUTE COR PULMONALE PRESENT: Primary | ICD-10-CM

## 2024-02-02 DIAGNOSIS — D64.9 ANEMIA, UNSPECIFIED TYPE: ICD-10-CM

## 2024-02-02 PROCEDURE — 99214 OFFICE O/P EST MOD 30 MIN: CPT | Mod: PBBFAC | Performed by: NURSE PRACTITIONER

## 2024-02-02 PROCEDURE — 99215 OFFICE O/P EST HI 40 MIN: CPT | Mod: S$PBB,,, | Performed by: NURSE PRACTITIONER

## 2024-02-02 NOTE — PROGRESS NOTES
Patient ID: [id number]     Chief Complaint: f/u hx pulmonary embolism        HPI:     HPI      Kely Sparks is a 53 y.o. female here today for a follow up hx of PE. Pt s/p SULEMAN and here for f/u to determine need to continue or discontinue anticoagulation therapy due to hx PE. Pt initially diagnosed with PE 23 and started  on  Eliquis. Pt has been on Eliquis X 7 months. Pt currently still taking Eliquis as prescribed. Denies SOB, coughing, bloody sputum, CP.         Immunizations:   Immunization History   Administered Date(s) Administered    COVID-19, MRNA, LN-S, PF (MODERNA FULL 0.5 ML DOSE) 01/15/2021, 2021    DTP 1970, 1971, 1974    MMR 2001    Measles / Rubella 1973    OPV 1971, 1973, 1974, 11/15/1977    Tdap 11/15/1977, 1983, 1993, 2006        ----------------------------  Abnormal Pap smear of cervix  Anemia  Chronic back pain  Chronic hypertension  Obesity, Class III, BMI 40-49.9 (morbid obesity)  Other pulmonary embolism without acute cor pulmonale      Comment:  history of PE in      Past Surgical History:   Procedure Laterality Date     SECTION      x2    CHOLECYSTECTOMY      CYSTOSCOPY N/A 2023    Procedure: CYSTOSCOPY;  Surgeon: Susie Thibodeaux MD;  Location: [facility];  Service: OB/GYN;  Laterality: N/A;    HYSTERECTOMY, TOTAL, LAPAROSCOPIC, WITH SALPINGECTOMY N/A 2023    Procedure: HYSTERECTOMY,TOTAL,LAPAROSCOPIC,WITH SALPINGECTOMY;  Surgeon: Susie Thibodeaux MD;  Location: [facility];  Service: OB/GYN;  Laterality: N/A;  *IODINE ALLERGY*  0 & 30 DEGREE SCOPE  T&M 2 UNITS    HYSTEROSCOPY WITH DILATION AND CURETTAGE OF UTERUS N/A 2022    Procedure: HYSTEROSCOPY, WITH DILATION AND CURETTAGE OF UTERUS;  Surgeon: Susie Thibodeaux MD;  Location: [facility];  Service: OB/GYN;  Laterality: N/A;  **MYOSURE**    TOTAL ABDOMINAL HYSTERECTOMY N/A 2023    Procedure: HYSTERECTOMY, TOTAL,  ABDOMINAL WITH SALPINGECTOMY;  Surgeon: Susie Thibodeaux MD;  Location: [facility];  Service: OB/GYN;  Laterality: N/A;    TUBAL LIGATION         Review of patient's allergies indicates:   Allergen Reactions    Shellfish containing products Hives and Itching       Current Outpatient Medications   Medication Instructions    ferrous sulfate 325 mg, Oral, Daily, Take with Vit C 500 mg with iron med to increase absorption.    methocarbamoL (ROBAXIN) 750 mg, Oral, Nightly    oxyCODONE (ROXICODONE) 5 mg, Oral, Every 4 hours PRN       Social History     Socioeconomic History    Marital status: Single   Tobacco Use    Smoking status: Never    Smokeless tobacco: Never   Substance and Sexual Activity    Alcohol use: Never    Drug use: Never    Sexual activity: Not Currently     Partners: Male     Social Determinants of Health     Financial Resource Strain: Medium Risk (7/24/2023)    Overall Financial Resource Strain (CARDIA)     Difficulty of Paying Living Expenses: Somewhat hard   Food Insecurity: No Food Insecurity (7/24/2023)    Hunger Vital Sign     Worried About Running Out of Food in the Last Year: Never true     Ran Out of Food in the Last Year: Never true   Transportation Needs: No Transportation Needs (7/24/2023)    PRAPARE - Transportation     Lack of Transportation (Medical): No     Lack of Transportation (Non-Medical): No   Physical Activity: Inactive (7/24/2023)    Exercise Vital Sign     Days of Exercise per Week: 0 days     Minutes of Exercise per Session: 0 min   Stress: Stress Concern Present (7/24/2023)    [employer] - Occupational Stress Questionnaire     Feeling of Stress : To some extent   Social Connections: Moderately Isolated (7/24/2023)    Social Connection and Isolation Panel [NHANES]     Frequency of Communication with Friends and Family: More than three times a week     Frequency of Social Gatherings with Friends and Family: More than three times a week     Attends  Hinduism Services: More than 4 times per year     Active Member of Clubs or Organizations: No     Attends Club or Organization Meetings: Never     Marital Status: Never    Housing Stability: Low Risk  (7/24/2023)    Housing Stability Vital Sign     Unable to Pay for Housing in the Last Year: No     Number of Places Lived in the Last Year: 1     Unstable Housing in the Last Year: No        Family History   Problem Relation Age of Onset    Hypertension Mother     No Known Problems Father         Patient Care Team:  Sera Villarreal FNP as PCP - General (Family Medicine)     Subjective:     Review of Systems     See HPI for details    Constitutional: Denies Change in appetite. Denies Chills. Denies Fever. Denies Night sweats.  Eye: Denies Blurred vision. Denies Discharge. Denies Eye pain.  ENT: Denies Decreased hearing. Denies Sore throat. Denies Swollen glands.  Respiratory: Denies Cough. Denies Shortness of breath. Denies Shortness of breath with exertion. Denies Wheezing.  Cardiovascular: DeniesChest pain at rest. Denies Chest pain with exertion. Denies Irregular heartbeat. Denies Palpitations. Denies Edema.  Gastrointestinal: Denies Abdominal pain. DeniesDiarrhea. Denies Nausea. Denies Vomiting. Denies Hematemesis or Hematochezia.  Genitourinary: Denies Dysuria. Denies Urinary frequency. Denies Urinary urgency. Denies Blood in urine.  Endocrine: Denies Cold intolerance. Denies Excessive thirst. Denies Heat intolerance. Denies Weight loss. Denies Weight gain.  Musculoskeletal: Denies Painful joints. Denies Weakness.  Integumentary: Denies Rash. Denies Itching. Denies Dry skin.  Neurologic: Denies Dizziness. Denies Fainting. Denies Headache.  Psychiatric: Denies Depression. Denies Anxiety. Denies Suicidal/Homicidal ideations.    All Other ROS: Negative except as stated in HPI.       Objective:     Visit Vitals  /82 (BP Location: Right arm, Patient Position: Sitting, BP Method: Large (Automatic))    Pulse 81   Temp 98.2 °F (36.8 °C) (Oral)   Resp 18   Ht 5' (1.524 m)   Wt 114.8 kg (253 lb)   BMI 49.41 kg/m²       Physical Exam    General: Alert and oriented, No acute distress.  Head: Normocephalic, Atraumatic.  Eye: Pupils are equal, round and reactive to light, Extraocular movements are intact, Sclera non-icteric.  Ears/Nose/Throat: Normal, Mucosa moist,Clear.  Neck/Thyroid: Supple, Non-tender, No carotid bruit, No lymphadenopathy, No JVD, Full range of motion.  Respiratory: Clear to auscultation bilaterally; No wheezes, rales or rhonchi,Non-labored respirations, Symmetrical chest wall expansion.  Cardiovascular: Regular rate and rhythm, S1/S2 normal, No murmurs, rubs or gallops.  Gastrointestinal: Soft, Non-tender, Non-distended, Normal bowel sounds, No palpable organomegaly.  Musculoskeletal: Normal range of motion.  Integumentary: Warm, Dry, Intact, No suspicious lesions or rashes.  Extremities: No clubbing, cyanosis or edema  Neurologic: No focal deficits, Cranial Nerves II-XII are grossly intact, Motor strength normal upper and lower extremities, Sensory exam intact.  Psychiatric: Normal interaction, Coherent speech, Euthymic mood, Appropriate affect       Labs Reviewed:     Chemistry:  Lab Results   Component Value Date     12/06/2023    K 4.9 12/06/2023    CHLORIDE 109 (H) 12/06/2023    BUN 17.5 12/06/2023    CREATININE 0.91 12/06/2023    EGFRNORACEVR >60 12/06/2023    GLUCOSE 108 (H) 12/06/2023    CALCIUM 8.5 12/06/2023    ALKPHOS 44 12/06/2023    LABPROT 5.9 (L) 12/06/2023    ALBUMIN 2.8 (L) 12/06/2023    AST 22 12/06/2023    ALT 11 12/06/2023    MG 1.90 07/24/2023    PHOS 2.7 07/24/2023    JPRUXGVZ24FL 24.4 (L) 01/21/2023        Lab Results   Component Value Date    HGBA1C 5.1 11/20/2023        Hematology:  Lab Results   Component Value Date    WBC 14.07 (H) 12/06/2023    HGB 8.3 (L) 12/06/2023    HCT 27.9 (L) 12/06/2023     12/06/2023       Lipid Panel:  Lab Results   Component Value  Date    CHOL 154 11/20/2023    HDL 52 11/20/2023    LDL 94.00 11/20/2023    TRIG 42 11/20/2023    TOTALCHOLEST 3 11/20/2023        Urine:  Lab Results   Component Value Date    COLORUA Colorless (A) 07/28/2023    APPEARANCEUA Clear 07/28/2023    SGUA 1.006 07/28/2023    PHUA 6.0 07/28/2023    PROTEINUA Negative 07/28/2023    GLUCOSEUA Normal 07/28/2023    KETONESUA Negative 07/28/2023    BLOODUA 3+ (A) 07/28/2023    NITRITESUA Negative 07/28/2023    LEUKOCYTESUR 25 (A) 07/28/2023    RBCUA 21-50 (A) 07/28/2023    WBCUA 0-5 07/28/2023    BACTERIA None Seen 07/28/2023    SQEPUA Trace (A) 07/28/2023    HYALINECASTS None Seen 07/28/2023        Assessment:       ICD-10-CM ICD-9-CM   1. Pulmonary embolism, unspecified chronicity, unspecified pulmonary embolism type, unspecified whether acute cor pulmonale present  I26.99 415.19   2. S/P laparoscopic hysterectomy  Z90.710 V88.01   3. Anemia, unspecified type  D64.9 285.9        Plan:     1. Pulmonary embolism, unspecified chronicity, unspecified pulmonary embolism type, unspecified whether acute cor pulmonale present  Pt had PE 7-24-23 after provoked episode from hormone replacement therapy for treatment AUB. Pt is s/p Abd complete hysterectomy done 12-5-23 for non cancerous reason; ie multiple uterine fibroids- pathology benign. Consulted Dr Junior regarding pt's hx of PE and recent SULEMAN. Pt denies further episode of SOB, s/s of blood clots. Pt has been on anticoagulation since 7-24-23- 6 months without further s/s of PE or DVT. OK to stop anticoagulation now. Will repeat D-dimer in 2 weeks for further evaluation of coagulation therapy. Pt verbalized understanding. ER precautions if symptoms develop.     2. S/P laparoscopic hysterectomy  Pt is s/p Abd complete hysterectomy done 12-5-23 for non cancerous reason; ie multiple uterine fibroids- pathology benign.         Follow up in about 3 weeks (around 2/23/2024) for telemed for D-dimer results. . In addition to their  scheduled follow up, the patient has also been instructed to follow up on as needed basis.     Future Appointments   Date Time Provider Department Center   2/23/2024  9:20 AM Sera Villarreal FNP [facility]   1/22/2025  9:10 AM Kathleen Gamble FNP [facility]        JUANA Arredondo

## 2024-02-16 ENCOUNTER — LAB VISIT (OUTPATIENT)
Dept: LAB | Facility: HOSPITAL | Age: 54
End: 2024-02-16
Attending: NURSE PRACTITIONER
Payer: COMMERCIAL

## 2024-02-16 DIAGNOSIS — I26.99 PULMONARY EMBOLISM, UNSPECIFIED CHRONICITY, UNSPECIFIED PULMONARY EMBOLISM TYPE, UNSPECIFIED WHETHER ACUTE COR PULMONALE PRESENT: ICD-10-CM

## 2024-02-16 DIAGNOSIS — D64.9 ANEMIA, UNSPECIFIED TYPE: ICD-10-CM

## 2024-02-16 DIAGNOSIS — Z90.710 S/P LAPAROSCOPIC HYSTERECTOMY: ICD-10-CM

## 2024-02-16 LAB
BASOPHILS # BLD AUTO: 0.03 X10(3)/MCL
BASOPHILS NFR BLD AUTO: 0.5 %
D DIMER PPP IA.FEU-MCNC: 0.79 UG/ML FEU (ref 0–0.5)
EOSINOPHIL # BLD AUTO: 0.23 X10(3)/MCL (ref 0–0.9)
EOSINOPHIL NFR BLD AUTO: 3.6 %
ERYTHROCYTE [DISTWIDTH] IN BLOOD BY AUTOMATED COUNT: 17.6 % (ref 11.5–17)
HCT VFR BLD AUTO: 37.9 % (ref 37–47)
HGB BLD-MCNC: 11.8 G/DL (ref 12–16)
IMM GRANULOCYTES # BLD AUTO: 0.02 X10(3)/MCL (ref 0–0.04)
IMM GRANULOCYTES NFR BLD AUTO: 0.3 %
LYMPHOCYTES # BLD AUTO: 1.61 X10(3)/MCL (ref 0.6–4.6)
LYMPHOCYTES NFR BLD AUTO: 25 %
MCH RBC QN AUTO: 26.1 PG (ref 27–31)
MCHC RBC AUTO-ENTMCNC: 31.1 G/DL (ref 33–36)
MCV RBC AUTO: 83.8 FL (ref 80–94)
MONOCYTES # BLD AUTO: 0.55 X10(3)/MCL (ref 0.1–1.3)
MONOCYTES NFR BLD AUTO: 8.6 %
NEUTROPHILS # BLD AUTO: 3.99 X10(3)/MCL (ref 2.1–9.2)
NEUTROPHILS NFR BLD AUTO: 62 %
NRBC BLD AUTO-RTO: 0 %
PLATELET # BLD AUTO: 264 X10(3)/MCL (ref 130–400)
PMV BLD AUTO: 11.6 FL (ref 7.4–10.4)
RBC # BLD AUTO: 4.52 X10(6)/MCL (ref 4.2–5.4)
WBC # SPEC AUTO: 6.43 X10(3)/MCL (ref 4.5–11.5)

## 2024-02-16 PROCEDURE — 36415 COLL VENOUS BLD VENIPUNCTURE: CPT

## 2024-02-16 PROCEDURE — 85025 COMPLETE CBC W/AUTO DIFF WBC: CPT

## 2024-02-16 PROCEDURE — 85379 FIBRIN DEGRADATION QUANT: CPT

## 2024-02-23 ENCOUNTER — OFFICE VISIT (OUTPATIENT)
Dept: INTERNAL MEDICINE | Facility: CLINIC | Age: 54
End: 2024-02-23
Payer: COMMERCIAL

## 2024-02-23 DIAGNOSIS — D50.0 IRON DEFICIENCY ANEMIA DUE TO CHRONIC BLOOD LOSS: ICD-10-CM

## 2024-02-23 DIAGNOSIS — D64.9 ANEMIA, UNSPECIFIED TYPE: ICD-10-CM

## 2024-02-23 DIAGNOSIS — I26.99 PULMONARY EMBOLISM, UNSPECIFIED CHRONICITY, UNSPECIFIED PULMONARY EMBOLISM TYPE, UNSPECIFIED WHETHER ACUTE COR PULMONALE PRESENT: Primary | ICD-10-CM

## 2024-02-23 PROCEDURE — 99215 OFFICE O/P EST HI 40 MIN: CPT | Mod: 95,,, | Performed by: NURSE PRACTITIONER

## 2024-02-23 RX ORDER — FERROUS SULFATE 325(65) MG
325 TABLET, DELAYED RELEASE (ENTERIC COATED) ORAL DAILY
Qty: 90 TABLET | Refills: 1 | Status: SHIPPED | OUTPATIENT
Start: 2024-02-23 | End: 2024-08-21

## 2024-02-23 NOTE — PROGRESS NOTES
Patient ID: [id number]     Chief Complaint: LAB RESULTS      HPI:     This is a telemedicine note. Patient was treated using telemedicine, real time audio and video, according to [facility] protocols. I, Sera ELIAS, conducted the visit from the [facility]. The patient participated in the visit at a non-[facility] location selected by the patient, identified below. I am licensed in the state where the patient stated they are located. The patient stated that they understood and accepted the privacy and security risks to their information at their location. This visit is not recorded.    Patient was located at the patient's home.       Kely Sparks is a 53 y.o. female here today for a telemedicine visit for f/u PE and holding Eliquis. Pt attempted to connect virtually but was unsuccessful so visit was conducted audio only.       ----------------------------  Abnormal Pap smear of cervix  Anemia  Chronic back pain  Chronic hypertension  Obesity, Class III, BMI 40-49.9 (morbid obesity)  Other pulmonary embolism without acute cor pulmonale      Comment:  history of PE in      Past Surgical History:   Procedure Laterality Date     SECTION      x2    CHOLECYSTECTOMY      CYSTOSCOPY N/A 2023    Procedure: CYSTOSCOPY;  Surgeon: Susie Thibodeaux MD;  Location: [facility];  Service: OB/GYN;  Laterality: N/A;    HYSTERECTOMY, TOTAL, LAPAROSCOPIC, WITH SALPINGECTOMY N/A 2023    Procedure: HYSTERECTOMY,TOTAL,LAPAROSCOPIC,WITH SALPINGECTOMY;  Surgeon: Susie Thibodeaux MD;  Location: [facility];  Service: OB/GYN;  Laterality: N/A;  *IODINE ALLERGY*  0 & 30 DEGREE SCOPE  T&M 2 UNITS    HYSTEROSCOPY WITH DILATION AND CURETTAGE OF UTERUS N/A 2022    Procedure: HYSTEROSCOPY, WITH DILATION AND CURETTAGE OF UTERUS;  Surgeon: Susie Thibodeaux MD;  Location: [facility];  Service: OB/GYN;  Laterality: N/A;  **MYOSURE**    TOTAL ABDOMINAL HYSTERECTOMY N/A 2023    Procedure:  HYSTERECTOMY, TOTAL, ABDOMINAL WITH SALPINGECTOMY;  Surgeon: Susie Thibodeaux MD;  Location: [facility];  Service: OB/GYN;  Laterality: N/A;    TUBAL LIGATION         Review of patient's allergies indicates:   Allergen Reactions    Shellfish containing products Hives and Itching       Outpatient Medications Marked as Taking for the 2/23/24 encounter (Office Visit) with Sera Villarreal FNP   Medication Sig Dispense Refill    methocarbamoL (ROBAXIN) 750 MG Tab Take 1 tablet (750 mg total) by mouth every evening. 90 tablet 0       Social History     Socioeconomic History    Marital status: Single   Tobacco Use    Smoking status: Never    Smokeless tobacco: Never   Substance and Sexual Activity    Alcohol use: Never    Drug use: Never    Sexual activity: Not Currently     Partners: Male     Social Determinants of Health     Financial Resource Strain: Medium Risk (7/24/2023)    Overall Financial Resource Strain (CARDIA)     Difficulty of Paying Living Expenses: Somewhat hard   Food Insecurity: No Food Insecurity (7/24/2023)    Hunger Vital Sign     Worried About Running Out of Food in the Last Year: Never true     Ran Out of Food in the Last Year: Never true   Transportation Needs: No Transportation Needs (7/24/2023)    PRAPARE - Transportation     Lack of Transportation (Medical): No     Lack of Transportation (Non-Medical): No   Physical Activity: Inactive (7/24/2023)    Exercise Vital Sign     Days of Exercise per Week: 0 days     Minutes of Exercise per Session: 0 min   Stress: Stress Concern Present (7/24/2023)    [employer] - Occupational Stress Questionnaire     Feeling of Stress : To some extent   Social Connections: Moderately Isolated (7/24/2023)    Social Connection and Isolation Panel [NHANES]     Frequency of Communication with Friends and Family: More than three times a week     Frequency of Social Gatherings with Friends and Family: More than three times a week     Attends  Christian Services: More than 4 times per year     Active Member of Clubs or Organizations: No     Attends Club or Organization Meetings: Never     Marital Status: Never    Housing Stability: Low Risk  (7/24/2023)    Housing Stability Vital Sign     Unable to Pay for Housing in the Last Year: No     Number of Places Lived in the Last Year: 1     Unstable Housing in the Last Year: No        Family History   Problem Relation Age of Onset    Hypertension Mother     No Known Problems Father         Patient Care Team:  Sera Villarreal FNP as PCP - General (Family Medicine)      Subjective:       Review of Systems       See HPI for details    Constitutional: Denies Change in appetite. Denies Chills. Denies Fever. Denies Night sweats.  Eye: Denies Blurred vision. Denies Discharge. Denies Eye pain.  ENT: Denies Decreased hearing. Denies Sore throat. Denies Swollen glands.  Respiratory: Denies Cough. Denies Shortness of breath. Denies Shortness of breath with exertion. Denies Wheezing.  Cardiovascular: DeniesChest pain at rest. Denies Chest pain with exertion. Denies Irregular heartbeat. Denies Palpitations. Denies Edema.  Gastrointestinal: Denies Abdominal pain. DeniesDiarrhea. Denies Nausea. Denies Vomiting. Denies Hematemesis or Hematochezia.  Genitourinary: Denies Dysuria. Denies Urinary frequency. Denies Urinary urgency. Denies Blood in urine.  Endocrine: Denies Cold intolerance. Denies Excessive thirst. Denies Heat intolerance. Denies Weight loss. Denies Weight gain.  Musculoskeletal: Denies Painful joints. Denies Weakness.  Integumentary: Denies Rash. Denies Itching. Denies Dry skin.  Neurologic: Denies Dizziness. Denies Fainting. Denies Headache.  Psychiatric: Denies Depression. Denies Anxiety. Denies Suicidal/Homicidal ideations.    All Other ROS: Negative except as stated in HPI.       Objective:     There were no vitals taken for this visit.    Physical Exam    Physical Exam: LIMITED DUE TO TELEMEDICINE  RESTRICTIONS.  General: Alert and oriented, No acute distress.  Respiratory: Non-labored respirations  Psychiatric: Normal interaction, Coherent speech, Euthymic mood, Appropriate affect       Assessment:       ICD-10-CM ICD-9-CM   1. Pulmonary embolism, unspecified chronicity, unspecified pulmonary embolism type, unspecified whether acute cor pulmonale present  I26.99 415.19   2. Anemia, unspecified type  D64.9 285.9   3. Anemia  D50.0 280.0        Plan:     1. Pulmonary embolism, unspecified chronicity, unspecified pulmonary embolism type, unspecified whether acute cor pulmonale present   Pt has held Eliquis for 2 weeks and D-dimer repeated after holding med was 0.79. Discussed results with Dr Junior. Informed pt considered high risk for repeat blood clot due to elevated D Dimer. Cont Eliquis as prescribed lifelong. Informed pt of recommendation  and to continue Eliquis as prescribed.          Orders Placed This Encounter   Procedures    CBC Auto Differential     Standing Status:   Future     Standing Expiration Date:   5/31/2024    Basic Metabolic Panel     Standing Status:   Future     Standing Expiration Date:   5/31/2024    Iron and TIBC     Standing Status:   Future     Standing Expiration Date:   5/31/2024          Follow up in about 3 months (around 5/23/2024) for with labs 1 week prior to appt. . In addition to their scheduled follow up, the patient has also been instructed to follow up on as needed basis.       Video Time Documentation:  Spent 40 minutes with patient  discussed health concerns. More than 50% of this time was spent in counseling and coordination of care.

## 2024-05-06 PROBLEM — I26.99 PULMONARY EMBOLISM: Status: RESOLVED | Noted: 2024-02-02 | Resolved: 2024-05-06

## 2024-05-24 ENCOUNTER — LAB VISIT (OUTPATIENT)
Dept: LAB | Facility: HOSPITAL | Age: 54
End: 2024-05-24
Attending: NURSE PRACTITIONER
Payer: COMMERCIAL

## 2024-05-24 DIAGNOSIS — I26.99 PULMONARY EMBOLISM, UNSPECIFIED CHRONICITY, UNSPECIFIED PULMONARY EMBOLISM TYPE, UNSPECIFIED WHETHER ACUTE COR PULMONALE PRESENT: ICD-10-CM

## 2024-05-24 DIAGNOSIS — D64.9 ANEMIA, UNSPECIFIED TYPE: ICD-10-CM

## 2024-05-24 LAB
ANION GAP SERPL CALC-SCNC: 5 MEQ/L
BASOPHILS # BLD AUTO: 0.03 X10(3)/MCL
BASOPHILS NFR BLD AUTO: 0.4 %
BUN SERPL-MCNC: 16.2 MG/DL (ref 9.8–20.1)
CALCIUM SERPL-MCNC: 9.4 MG/DL (ref 8.4–10.2)
CHLORIDE SERPL-SCNC: 108 MMOL/L (ref 98–107)
CO2 SERPL-SCNC: 26 MMOL/L (ref 22–29)
CREAT SERPL-MCNC: 0.79 MG/DL (ref 0.55–1.02)
CREAT/UREA NIT SERPL: 21
EOSINOPHIL # BLD AUTO: 0.23 X10(3)/MCL (ref 0–0.9)
EOSINOPHIL NFR BLD AUTO: 3.3 %
ERYTHROCYTE [DISTWIDTH] IN BLOOD BY AUTOMATED COUNT: 14.4 % (ref 11.5–17)
GFR SERPLBLD CREATININE-BSD FMLA CKD-EPI: >60 ML/MIN/1.73/M2
GLUCOSE SERPL-MCNC: 94 MG/DL (ref 74–100)
HCT VFR BLD AUTO: 39.3 % (ref 37–47)
HGB BLD-MCNC: 12.8 G/DL (ref 12–16)
IMM GRANULOCYTES # BLD AUTO: 0.01 X10(3)/MCL (ref 0–0.04)
IMM GRANULOCYTES NFR BLD AUTO: 0.1 %
IRON SATN MFR SERPL: 22 % (ref 20–50)
IRON SERPL-MCNC: 58 UG/DL (ref 50–170)
LYMPHOCYTES # BLD AUTO: 1.84 X10(3)/MCL (ref 0.6–4.6)
LYMPHOCYTES NFR BLD AUTO: 26.4 %
MCH RBC QN AUTO: 29 PG (ref 27–31)
MCHC RBC AUTO-ENTMCNC: 32.6 G/DL (ref 33–36)
MCV RBC AUTO: 89.1 FL (ref 80–94)
MONOCYTES # BLD AUTO: 0.57 X10(3)/MCL (ref 0.1–1.3)
MONOCYTES NFR BLD AUTO: 8.2 %
NEUTROPHILS # BLD AUTO: 4.28 X10(3)/MCL (ref 2.1–9.2)
NEUTROPHILS NFR BLD AUTO: 61.6 %
NRBC BLD AUTO-RTO: 0 %
PLATELET # BLD AUTO: 250 X10(3)/MCL (ref 130–400)
PMV BLD AUTO: 12.5 FL (ref 7.4–10.4)
POTASSIUM SERPL-SCNC: 4.3 MMOL/L (ref 3.5–5.1)
RBC # BLD AUTO: 4.41 X10(6)/MCL (ref 4.2–5.4)
SODIUM SERPL-SCNC: 139 MMOL/L (ref 136–145)
TIBC SERPL-MCNC: 205 UG/DL (ref 70–310)
TIBC SERPL-MCNC: 263 UG/DL (ref 250–450)
TRANSFERRIN SERPL-MCNC: 238 MG/DL (ref 180–382)
WBC # SPEC AUTO: 6.96 X10(3)/MCL (ref 4.5–11.5)

## 2024-05-24 PROCEDURE — 80048 BASIC METABOLIC PNL TOTAL CA: CPT

## 2024-05-24 PROCEDURE — 85025 COMPLETE CBC W/AUTO DIFF WBC: CPT

## 2024-05-24 PROCEDURE — 83540 ASSAY OF IRON: CPT

## 2024-05-24 PROCEDURE — 36415 COLL VENOUS BLD VENIPUNCTURE: CPT

## 2024-05-27 ENCOUNTER — OFFICE VISIT (OUTPATIENT)
Dept: INTERNAL MEDICINE | Facility: CLINIC | Age: 54
End: 2024-05-27
Payer: COMMERCIAL

## 2024-05-27 VITALS
RESPIRATION RATE: 18 BRPM | HEART RATE: 79 BPM | HEIGHT: 61 IN | BODY MASS INDEX: 48.33 KG/M2 | SYSTOLIC BLOOD PRESSURE: 126 MMHG | WEIGHT: 256 LBS | TEMPERATURE: 98 F | DIASTOLIC BLOOD PRESSURE: 80 MMHG

## 2024-05-27 DIAGNOSIS — R31.21 ASYMPTOMATIC MICROSCOPIC HEMATURIA: ICD-10-CM

## 2024-05-27 DIAGNOSIS — I10 PRIMARY HYPERTENSION: ICD-10-CM

## 2024-05-27 DIAGNOSIS — E55.9 VITAMIN D DEFICIENCY: ICD-10-CM

## 2024-05-27 DIAGNOSIS — D50.0 IRON DEFICIENCY ANEMIA DUE TO CHRONIC BLOOD LOSS: ICD-10-CM

## 2024-05-27 DIAGNOSIS — Z00.00 WELL ADULT EXAM: ICD-10-CM

## 2024-05-27 DIAGNOSIS — Z12.31 BREAST CANCER SCREENING BY MAMMOGRAM: ICD-10-CM

## 2024-05-27 DIAGNOSIS — I26.99 PULMONARY EMBOLISM, UNSPECIFIED CHRONICITY, UNSPECIFIED PULMONARY EMBOLISM TYPE, UNSPECIFIED WHETHER ACUTE COR PULMONALE PRESENT: Primary | ICD-10-CM

## 2024-05-27 PROBLEM — R31.9 HEMATURIA: Status: ACTIVE | Noted: 2024-05-27

## 2024-05-27 PROCEDURE — 99214 OFFICE O/P EST MOD 30 MIN: CPT | Mod: S$PBB,,, | Performed by: NURSE PRACTITIONER

## 2024-05-27 PROCEDURE — 99214 OFFICE O/P EST MOD 30 MIN: CPT | Mod: PBBFAC | Performed by: NURSE PRACTITIONER

## 2024-05-27 RX ORDER — GABAPENTIN 300 MG/1
300 CAPSULE ORAL NIGHTLY
COMMUNITY
Start: 2024-05-08

## 2024-05-27 RX ORDER — HYDROCODONE BITARTRATE AND ACETAMINOPHEN 5; 325 MG/1; MG/1
1 TABLET ORAL 3 TIMES DAILY
COMMUNITY
Start: 2024-04-09

## 2024-05-27 RX ORDER — TIZANIDINE 4 MG/1
4 TABLET ORAL NIGHTLY
COMMUNITY
Start: 2024-05-08

## 2024-05-27 NOTE — PROGRESS NOTES
Patient ID: [id number]     Chief Complaint: LAB RESULTS        HPI:     GIANLUCA Sparks is a 53 y.o. female here today for a follow up.         Optometrist:  Dentist:  Breast Cancer Screening:  [unfilled]   Cervical Cancer Screening:     Colorectal Cancer Screening:  Diabetic Eye Exam:  Diabetic Foot Exam:  Lung Cancer Screening:    Prostate Cancer Screening:  AAA Screening:  Osteoporosis Screening:  Medicare Wellness:   Immunizations:   Immunization History   Administered Date(s) Administered    COVID-19, MRNA, LN-S, PF (MODERNA FULL 0.5 ML DOSE) 01/15/2021, 2021    DTP 1971, 1971, 1974    MMR 2001    Measles / Rubella 1973    OPV 1971, 1973, 1974, 11/15/1977    Tdap 11/15/1977, 1983, 1993, 2006        -------------------------------------    Abnormal Pap smear of cervix    Anemia    Chronic back pain    Chronic hypertension    Obesity, Class III, BMI 40-49.9 (morbid obesity)    Other pulmonary embolism without acute cor pulmonale    history of PE in         Past Surgical History:   Procedure Laterality Date     SECTION      x2    CHOLECYSTECTOMY      CYSTOSCOPY N/A 2023    Procedure: CYSTOSCOPY;  Surgeon: Susie Thibodeaux MD;  Location: [facility];  Service: OB/GYN;  Laterality: N/A;    HYSTERECTOMY, TOTAL, LAPAROSCOPIC, WITH SALPINGECTOMY N/A 2023    Procedure: HYSTERECTOMY,TOTAL,LAPAROSCOPIC,WITH SALPINGECTOMY;  Surgeon: Susie Thibodeaux MD;  Location: [facility];  Service: OB/GYN;  Laterality: N/A;  *IODINE ALLERGY*  0 & 30 DEGREE SCOPE  T&M 2 UNITS    HYSTEROSCOPY WITH DILATION AND CURETTAGE OF UTERUS N/A 2022    Procedure: HYSTEROSCOPY, WITH DILATION AND CURETTAGE OF UTERUS;  Surgeon: Susie Thibodeaux MD;  Location: [facility];  Service: OB/GYN;  Laterality: N/A;  **MYOSURE**    TOTAL ABDOMINAL HYSTERECTOMY N/A 2023    Procedure: HYSTERECTOMY, TOTAL, ABDOMINAL WITH SALPINGECTOMY;   Surgeon: Susie Thibodeaux MD;  Location: [facility];  Service: OB/GYN;  Laterality: N/A;    TUBAL LIGATION         Review of patient's allergies indicates:   Allergen Reactions    Shellfish containing products Hives and Itching       Current Outpatient Medications   Medication Instructions    apixaban (ELIQUIS) 5 mg, Oral, 2 times daily    ferrous sulfate 325 mg, Oral, Daily, Take with Vit C 500 mg with iron med to increase absorption.    gabapentin (NEURONTIN) 300 mg, Oral, Nightly    HYDROcodone-acetaminophen (NORCO) 5-325 mg per tablet 1 tablet, Oral, 3 times daily    methocarbamoL (ROBAXIN) 750 mg, Oral, Nightly    tiZANidine (ZANAFLEX) 4 mg, Oral, Nightly       Social History     Socioeconomic History    Marital status: Single   Tobacco Use    Smoking status: Never    Smokeless tobacco: Never   Substance and Sexual Activity    Alcohol use: Never    Drug use: Never    Sexual activity: Not Currently     Partners: Male     Social Determinants of Health     Financial Resource Strain: Medium Risk (7/24/2023)    Overall Financial Resource Strain (CARDIA)     Difficulty of Paying Living Expenses: Somewhat hard   Food Insecurity: No Food Insecurity (7/24/2023)    Hunger Vital Sign     Worried About Running Out of Food in the Last Year: Never true     Ran Out of Food in the Last Year: Never true   Transportation Needs: No Transportation Needs (7/24/2023)    PRAPARE - Transportation     Lack of Transportation (Medical): No     Lack of Transportation (Non-Medical): No   Physical Activity: Inactive (7/24/2023)    Exercise Vital Sign     Days of Exercise per Week: 0 days     Minutes of Exercise per Session: 0 min   Stress: Stress Concern Present (7/24/2023)    [employer] - Occupational Stress Questionnaire     Feeling of Stress : To some extent   Housing Stability: Low Risk  (7/24/2023)    Housing Stability Vital Sign     Unable to Pay for Housing in the Last Year: No     Number of Places Lived  "in the Last Year: 1     Unstable Housing in the Last Year: No        Family History   Problem Relation Name Age of Onset    Hypertension Mother      No Known Problems Father          Patient Care Team:  Sera Villarreal FNP as PCP - General (Family Medicine)     Subjective:     Review of Systems     See HPI for details    Constitutional: Denies Change in appetite. Denies Chills. Denies Fever. Denies Night sweats.  Eye: Denies Blurred vision. Denies Discharge. Denies Eye pain.  ENT: Denies Decreased hearing. Denies Sore throat. Denies Swollen glands.  Respiratory: Denies Cough. Denies Shortness of breath. Denies Shortness of breath with exertion. Denies Wheezing.  Cardiovascular: DeniesChest pain at rest. Denies Chest pain with exertion. Denies Irregular heartbeat. Denies Palpitations. Denies Edema.  Gastrointestinal: Denies Abdominal pain. DeniesDiarrhea. Denies Nausea. Denies Vomiting. Denies Hematemesis or Hematochezia.  Genitourinary: Denies Dysuria. Denies Urinary frequency. Denies Urinary urgency. Denies Blood in urine.  Endocrine: Denies Cold intolerance. Denies Excessive thirst. Denies Heat intolerance. Denies Weight loss. Denies Weight gain.  Musculoskeletal: Denies Painful joints. Denies Weakness.  Integumentary: Denies Rash. Denies Itching. Denies Dry skin.  Neurologic: Denies Dizziness. Denies Fainting. Denies Headache.  Psychiatric: Denies Depression. Denies Anxiety. Denies Suicidal/Homicidal ideations.    All Other ROS: Negative except as stated in HPI.       Objective:     Visit Vitals  /80 (BP Location: Right arm, Patient Position: Sitting, BP Method: Large (Automatic))   Pulse 79   Temp 98.4 °F (36.9 °C) (Oral)   Resp 18   Ht 5' 1" (1.549 m)   Wt 116.1 kg (256 lb)   BMI 48.37 kg/m²       Physical Exam    General: Alert and oriented, No acute distress.  Head: Normocephalic, Atraumatic.  Eye: Pupils are equal, round and reactive to light, Extraocular movements are intact, Sclera " non-icteric.  Ears/Nose/Throat: Normal, Mucosa moist,Clear.  Neck/Thyroid: Supple, Non-tender, No carotid bruit, No lymphadenopathy, No JVD, Full range of motion.  Respiratory: Clear to auscultation bilaterally; No wheezes, rales or rhonchi,Non-labored respirations, Symmetrical chest wall expansion.  Cardiovascular: Regular rate and rhythm, S1/S2 normal, No murmurs, rubs or gallops.  Gastrointestinal: Soft, Non-tender, Non-distended, Normal bowel sounds, No palpable organomegaly.  Musculoskeletal: Normal range of motion.  Integumentary: Warm, Dry, Intact, No suspicious lesions or rashes.  Extremities: No clubbing, cyanosis or edema  Neurologic: No focal deficits, Cranial Nerves II-XII are grossly intact, Motor strength normal upper and lower extremities, Sensory exam intact.  Psychiatric: Normal interaction, Coherent speech, Euthymic mood, Appropriate affect       Labs Reviewed:     Chemistry:  Lab Results   Component Value Date     05/24/2024    K 4.3 05/24/2024    CHLORIDE 108 (H) 05/24/2024    BUN 16.2 05/24/2024    CREATININE 0.79 05/24/2024    EGFRNORACEVR >60 05/24/2024    GLUCOSE 94 05/24/2024    CALCIUM 9.4 05/24/2024    ALKPHOS 44 12/06/2023    LABPROT 5.9 (L) 12/06/2023    ALBUMIN 2.8 (L) 12/06/2023    AST 22 12/06/2023    ALT 11 12/06/2023    MG 1.90 07/24/2023    PHOS 2.7 07/24/2023    [id number] 24.4 (L) 01/21/2023        Lab Results   Component Value Date    HGBA1C 5.1 11/20/2023        Hematology:  Lab Results   Component Value Date    WBC 6.96 05/24/2024    HGB 12.8 05/24/2024    HCT 39.3 05/24/2024     05/24/2024       Lipid Panel:  Lab Results   Component Value Date    CHOL 154 11/20/2023    HDL 52 11/20/2023    LDL 94.00 11/20/2023    TRIG 42 11/20/2023    TOTALCHOLEST 3 11/20/2023        Urine:  Lab Results   Component Value Date    COLORUA Colorless (A) 07/28/2023    APPEARANCEUA Clear 07/28/2023    SGUA 1.006 07/28/2023    PHUA 6.0 07/28/2023    PROTEINUA Negative 07/28/2023     GLUCOSEUA Normal 07/28/2023    KETONESUA Negative 07/28/2023    BLOODUA 3+ (A) 07/28/2023    NITRITESUA Negative 07/28/2023    LEUKOCYTESUR 25 (A) 07/28/2023    RBCUA 21-50 (A) 07/28/2023    WBCUA 0-5 07/28/2023    BACTERIA None Seen 07/28/2023    SQEPUA Trace (A) 07/28/2023    HYALINECASTS None Seen 07/28/2023        Assessment:       ICD-10-CM ICD-9-CM   1. Pulmonary embolism, unspecified chronicity, unspecified pulmonary embolism type, unspecified whether acute cor pulmonale present  I26.99 415.19   2. Anemia  D50.0 280.0   3. Primary hypertension  I10 401.9   4. Well adult exam  Z00.00 V70.0   5. Asymptomatic microscopic hematuria  R31.21 599.72   6. Vitamin D deficiency  E55.9 268.9   7. Breast cancer screening by mammogram  Z12.31 V76.12        Plan:     1. Pulmonary embolism, unspecified chronicity, unspecified pulmonary embolism type, unspecified whether acute cor pulmonale present  Pt has held Eliquis for 2 weeks and D-dimer repeated after holding med was 0.79. Discussed results with Dr Junior. Informed pt considered high risk for repeat blood clot due to elevated D Dimer. Cont Eliquis as prescribed lifelong. Informed pt of recommendation and to continue Eliquis as prescribed.     2. Anemia  Resolved. Will monitor.     3. Primary hypertension  Encouraged to continue Low fat low salt diet and exercise.     4. Well adult exam  Labs in 3 months. Keep GYN cl appt. MMG in 4 months. UTD FIT 9-19-23 negative results. Next due 9/2026.     5. Asymptomatic microscopic hematuria  UA in 3 months. Pt is s/p complete hyst which was possible cause of blood in urine due to frequent vaginal bleeding prior to  hyst.     6. Vitamin D deficiency  Vit  d level in 3 months.          Follow up in about 3 months (around 8/27/2024) for with labs 1 week prior to appt. . In addition to their scheduled follow up, the patient has also been instructed to follow up on as needed basis.     Future Appointments   Date Time Provider  Department Center   1/22/2025  9:10 AM Kathleen Gamble FNP [facility]        JUANA Arredondo

## 2024-08-26 PROBLEM — Z00.00 WELL ADULT EXAM: Status: RESOLVED | Noted: 2022-06-16 | Resolved: 2024-08-26

## 2024-08-26 PROBLEM — I26.99 PULMONARY EMBOLISM: Status: RESOLVED | Noted: 2023-07-24 | Resolved: 2024-08-26

## 2024-09-20 ENCOUNTER — HOSPITAL ENCOUNTER (OUTPATIENT)
Dept: RADIOLOGY | Facility: HOSPITAL | Age: 54
Discharge: HOME | End: 2024-09-20
Attending: NURSE PRACTITIONER
Payer: COMMERCIAL

## 2024-09-20 DIAGNOSIS — Z12.31 BREAST CANCER SCREENING BY MAMMOGRAM: ICD-10-CM

## 2024-09-20 PROCEDURE — 77067 SCR MAMMO BI INCL CAD: CPT | Mod: 26,,, | Performed by: RADIOLOGY

## 2024-09-20 PROCEDURE — 77063 BREAST TOMOSYNTHESIS BI: CPT | Mod: 26,,, | Performed by: RADIOLOGY

## 2024-09-20 PROCEDURE — 77067 SCR MAMMO BI INCL CAD: CPT | Mod: TC

## 2024-11-18 ENCOUNTER — OFFICE VISIT (OUTPATIENT)
Dept: INTERNAL MEDICINE | Facility: CLINIC | Age: 54
End: 2024-11-18
Payer: COMMERCIAL

## 2024-11-18 VITALS
BODY MASS INDEX: 50.88 KG/M2 | DIASTOLIC BLOOD PRESSURE: 82 MMHG | OXYGEN SATURATION: 99 % | TEMPERATURE: 99 F | WEIGHT: 269.5 LBS | SYSTOLIC BLOOD PRESSURE: 139 MMHG | HEART RATE: 92 BPM | RESPIRATION RATE: 18 BRPM | HEIGHT: 61 IN

## 2024-11-18 DIAGNOSIS — I26.99 PULMONARY EMBOLISM, UNSPECIFIED CHRONICITY, UNSPECIFIED PULMONARY EMBOLISM TYPE, UNSPECIFIED WHETHER ACUTE COR PULMONALE PRESENT: ICD-10-CM

## 2024-11-18 DIAGNOSIS — Z12.31 ENCOUNTER FOR SCREENING MAMMOGRAM FOR MALIGNANT NEOPLASM OF BREAST: ICD-10-CM

## 2024-11-18 DIAGNOSIS — Z86.711 HISTORY OF PULMONARY EMBOLISM: Primary | ICD-10-CM

## 2024-11-18 DIAGNOSIS — D50.0 IRON DEFICIENCY ANEMIA DUE TO CHRONIC BLOOD LOSS: ICD-10-CM

## 2024-11-18 DIAGNOSIS — Z12.11 COLON CANCER SCREENING: ICD-10-CM

## 2024-11-18 DIAGNOSIS — I10 PRIMARY HYPERTENSION: ICD-10-CM

## 2024-11-18 DIAGNOSIS — E55.9 VITAMIN D DEFICIENCY: ICD-10-CM

## 2024-11-18 DIAGNOSIS — R31.21 ASYMPTOMATIC MICROSCOPIC HEMATURIA: ICD-10-CM

## 2024-11-18 PROCEDURE — 99214 OFFICE O/P EST MOD 30 MIN: CPT | Mod: PBBFAC | Performed by: NURSE PRACTITIONER

## 2024-11-18 NOTE — PROGRESS NOTES
Patient ID: [id number]     Chief Complaint: lab results        HPI:     GIANLUCA Sparks is a 54 y.o. female here today for a follow up.         Immunizations:   Immunization History   Administered Date(s) Administered    COVID-19, MRNA, LN-S, PF (MODERNA FULL 0.5 ML DOSE) 01/15/2021, 2021    DTP 1971, 1971, 1974    MMR 2001    Measles / Rubella 1973    OPV 1971, 1973, 1974, 11/15/1977    Tdap 11/15/1977, 1983, 1993, 2006        -------------------------------------    Abnormal Pap smear of cervix    Anemia    Chronic back pain    Chronic hypertension    Obesity, Class III, BMI 40-49.9 (morbid obesity)    Other pulmonary embolism without acute cor pulmonale    history of PE in         Past Surgical History:   Procedure Laterality Date     SECTION      x2    CHOLECYSTECTOMY      CYSTOSCOPY N/A 2023    Procedure: CYSTOSCOPY;  Surgeon: Susie Thibodeaux MD;  Location: [facility];  Service: OB/GYN;  Laterality: N/A;    HYSTERECTOMY, TOTAL, LAPAROSCOPIC, WITH SALPINGECTOMY N/A 2023    Procedure: HYSTERECTOMY,TOTAL,LAPAROSCOPIC,WITH SALPINGECTOMY;  Surgeon: Susie Thibodeaux MD;  Location: [facility];  Service: OB/GYN;  Laterality: N/A;  *IODINE ALLERGY*  0 & 30 DEGREE SCOPE  T&M 2 UNITS    HYSTEROSCOPY WITH DILATION AND CURETTAGE OF UTERUS N/A 2022    Procedure: HYSTEROSCOPY, WITH DILATION AND CURETTAGE OF UTERUS;  Surgeon: Susie Thibodeaux MD;  Location: [facility];  Service: OB/GYN;  Laterality: N/A;  **MYOSURE**    TOTAL ABDOMINAL HYSTERECTOMY N/A 2023    Procedure: HYSTERECTOMY, TOTAL, ABDOMINAL WITH SALPINGECTOMY;  Surgeon: Susie Thibodeaux MD;  Location: [facility];  Service: OB/GYN;  Laterality: N/A;    TUBAL LIGATION         Review of patient's allergies indicates:   Allergen Reactions    Shellfish containing products Hives and Itching       Current Outpatient Medications   Medication Instructions     apixaban (ELIQUIS) 5 mg, Oral, 2 times daily    gabapentin (NEURONTIN) 300 mg, Nightly    HYDROcodone-acetaminophen (NORCO) 5-325 mg per tablet 1 tablet, 3 times daily    tiZANidine (ZANAFLEX) 4 mg, Nightly       Social History     Socioeconomic History    Marital status: Single   Tobacco Use    Smoking status: Never    Smokeless tobacco: Never   Substance and Sexual Activity    Alcohol use: Never    Drug use: Never    Sexual activity: Not Currently     Partners: Male     Social Drivers of Health     Financial Resource Strain: Medium Risk (11/18/2024)    Overall Financial Resource Strain (CARDIA)     Difficulty of Paying Living Expenses: Somewhat hard   Food Insecurity: Patient Declined (11/18/2024)    Hunger Vital Sign     Worried About Running Out of Food in the Last Year: Patient declined     Ran Out of Food in the Last Year: Patient declined   Transportation Needs: No Transportation Needs (7/24/2023)    PRAPARE - Transportation     Lack of Transportation (Medical): No     Lack of Transportation (Non-Medical): No   Physical Activity: Insufficiently Active (11/18/2024)    Exercise Vital Sign     Days of Exercise per Week: 2 days     Minutes of Exercise per Session: 20 min   Stress: Stress Concern Present (11/18/2024)    [employer] - Occupational Stress Questionnaire     Feeling of Stress : To some extent   Housing Stability: High Risk (11/18/2024)    Housing Stability Vital Sign     Unable to Pay for Housing in the Last Year: Yes        Family History   Problem Relation Name Age of Onset    Hypertension Mother      No Known Problems Father          Patient Care Team:  Sera Villarreal FNP as PCP - General (Family Medicine)     Subjective:     Review of Systems     See HPI for details    Constitutional: Denies Change in appetite. Denies Chills. Denies Fever. Denies Night sweats.  Eye: Denies Blurred vision. Denies Discharge. Denies Eye pain.  ENT: Denies Decreased hearing. Denies  "Sore throat. Denies Swollen glands.  Respiratory: Denies Cough. Denies Shortness of breath. Denies Shortness of breath with exertion. Denies Wheezing.  Cardiovascular: DeniesChest pain at rest. Denies Chest pain with exertion. Denies Irregular heartbeat. Denies Palpitations. Denies Edema.  Gastrointestinal: Denies Abdominal pain. DeniesDiarrhea. Denies Nausea. Denies Vomiting. Denies Hematemesis or Hematochezia.  Genitourinary: Denies Dysuria. Denies Urinary frequency. Denies Urinary urgency. Denies Blood in urine.  Endocrine: Denies Cold intolerance. Denies Excessive thirst. Denies Heat intolerance. Denies Weight loss. Denies Weight gain.  Musculoskeletal: Denies Painful joints. Denies Weakness.  Integumentary: Denies Rash. Denies Itching. Denies Dry skin.  Neurologic: Denies Dizziness. Denies Fainting. Denies Headache.  Psychiatric: Denies Depression. Denies Anxiety. Denies Suicidal/Homicidal ideations.    All Other ROS: Negative except as stated in HPI.       Objective:     Visit Vitals  /82 (BP Location: Left arm, Patient Position: Sitting)   Pulse 92   Temp 99.4 °F (37.4 °C) (Oral)   Resp 18   Ht 5' 1" (1.549 m)   Wt 122.2 kg (269 lb 8 oz)   SpO2 99%   BMI 50.92 kg/m²       Physical Exam    General: Alert and oriented, No acute distress.  Head: Normocephalic, Atraumatic.  Eye: Pupils are equal, round and reactive to light, Extraocular movements are intact, Sclera non-icteric.  Ears/Nose/Throat: Normal, Mucosa moist,Clear.  Neck/Thyroid: Supple, Non-tender, No carotid bruit, No lymphadenopathy, No JVD, Full range of motion.  Respiratory: Clear to auscultation bilaterally; No wheezes, rales or rhonchi,Non-labored respirations, Symmetrical chest wall expansion.  Cardiovascular: Regular rate and rhythm, S1/S2 normal, No murmurs, rubs or gallops.  Gastrointestinal: Soft, Non-tender, Non-distended, Normal bowel sounds, No palpable organomegaly.  Musculoskeletal: Normal range of motion.  Integumentary: Warm, " Dry, Intact, No suspicious lesions or rashes.  Extremities: No clubbing, cyanosis or edema  Neurologic: No focal deficits, Cranial Nerves II-XII are grossly intact, Motor strength normal upper and lower extremities, Sensory exam intact.  Psychiatric: Normal interaction, Coherent speech, Euthymic mood, Appropriate affect       Labs Reviewed:     Chemistry:  Lab Results   Component Value Date     05/24/2024    K 4.3 05/24/2024    BUN 16.2 05/24/2024    CREATININE 0.79 05/24/2024    EGFRNORACEVR >60 05/24/2024    GLUCOSE 94 05/24/2024    CALCIUM 9.4 05/24/2024    ALKPHOS 44 12/06/2023    LABPROT 5.9 (L) 12/06/2023    ALBUMIN 2.8 (L) 12/06/2023    AST 22 12/06/2023    ALT 11 12/06/2023    MG 1.90 07/24/2023    PHOS 2.7 07/24/2023    CMNCAAPM58RR 24.4 (L) 01/21/2023        Lab Results   Component Value Date    HGBA1C 5.1 11/20/2023        Hematology:  Lab Results   Component Value Date    WBC 6.96 05/24/2024    HGB 12.8 05/24/2024    HCT 39.3 05/24/2024     05/24/2024       Lipid Panel:  Lab Results   Component Value Date    CHOL 154 11/20/2023    HDL 52 11/20/2023    LDL 94.00 11/20/2023    TRIG 42 11/20/2023    TOTALCHOLEST 3 11/20/2023        Urine:  Lab Results   Component Value Date    APPEARANCEUA Clear 07/28/2023    SGUA 1.006 07/28/2023    PROTEINUA Negative 07/28/2023    KETONESUA Negative 07/28/2023    LEUKOCYTESUR 25 (A) 07/28/2023    RBCUA 21-50 (A) 07/28/2023    WBCUA 0-5 07/28/2023    BACTERIA None Seen 07/28/2023    SQEPUA Trace (A) 07/28/2023    HYALINECASTS None Seen 07/28/2023        Assessment:       ICD-10-CM ICD-9-CM   1. History of pulmonary embolism  Z86.711 V12.55   2. Anemia  D50.0 280.0   3. Primary hypertension  I10 401.9   4. Asymptomatic microscopic hematuria  R31.21 599.72   5. Vitamin D deficiency  E55.9 268.9   6. Encounter for screening mammogram for malignant neoplasm of breast  Z12.31 V76.12   7. Colon cancer screening  Z12.11 V76.51   8. Pulmonary embolism, unspecified  chronicity, unspecified pulmonary embolism type, unspecified whether acute cor pulmonale present  I26.99 415.19        Plan:     1. History of pulmonary embolism (Primary)  Pt on lifelong therapy of Eliquis. Cont as prescribed. ER precautions given.     2. Anemia  CBC in 3 months.     3. Primary hypertension  BP controlled. Low fat low salt diet and exercise.     4. Asymptomatic microscopic hematuria  UA C&S in 3 months.     5. Vitamin D deficiency  Vit D level in 3 months.     6. Encounter for screening mammogram for malignant neoplasm of breast  UTD MMG.     7. Colon cancer screening  Pt request to try Cologuard for colon CA screening. Order place in computer.          Follow up in about 3 months (around 2/18/2025) for with labs 1 week prior to appt. . In addition to their scheduled follow up, the patient has also been instructed to follow up on as needed basis.     Future Appointments   Date Time Provider Department Center   1/22/2025  9:10 AM Kathleen Gamble FNP [facility] GYN [facility]        JUANA Arredondo

## 2025-01-24 ENCOUNTER — OFFICE VISIT (OUTPATIENT)
Dept: GYNECOLOGY | Facility: CLINIC | Age: 55
End: 2025-01-24
Payer: COMMERCIAL

## 2025-01-24 VITALS
WEIGHT: 276 LBS | BODY MASS INDEX: 52.11 KG/M2 | DIASTOLIC BLOOD PRESSURE: 72 MMHG | OXYGEN SATURATION: 97 % | HEART RATE: 90 BPM | HEIGHT: 61 IN | RESPIRATION RATE: 18 BRPM | SYSTOLIC BLOOD PRESSURE: 117 MMHG | TEMPERATURE: 98 F

## 2025-01-24 DIAGNOSIS — Z01.419 WELL WOMAN EXAM WITH ROUTINE GYNECOLOGICAL EXAM: Primary | ICD-10-CM

## 2025-01-24 DIAGNOSIS — Z12.31 ENCOUNTER FOR SCREENING MAMMOGRAM FOR BREAST CANCER: ICD-10-CM

## 2025-01-24 PROCEDURE — 3074F SYST BP LT 130 MM HG: CPT | Mod: CPTII,,,

## 2025-01-24 PROCEDURE — 1159F MED LIST DOCD IN RCRD: CPT | Mod: CPTII,,,

## 2025-01-24 PROCEDURE — 99396 PREV VISIT EST AGE 40-64: CPT | Mod: S$PBB,,,

## 2025-01-24 PROCEDURE — 3078F DIAST BP <80 MM HG: CPT | Mod: CPTII,,,

## 2025-01-24 PROCEDURE — 3008F BODY MASS INDEX DOCD: CPT | Mod: CPTII,,,

## 2025-01-24 PROCEDURE — 99214 OFFICE O/P EST MOD 30 MIN: CPT | Mod: PBBFAC

## 2025-01-24 NOTE — PROGRESS NOTES
[facility] -  Gynecology / Women's Health Clinic     Subjective:      Patient ID: Kely Sparks is a 54 y.o. female.    Chief Complaint:  Well Woman      History of Present Illness:  The patient  here for annual exam. SULEMAN/BS/cystoscopy d/t AUB- L 2023 with GYN. Denies history of abnormal paps. Last pap -NIL and HPV neg. MG- 24 & BIRADS 1. Denies breast or urinary complaints. Denies pelvic pain, abnormal bleeding or discharge. Admits hot flashes, daily declined medical management. Pt reports no STIs in the past and no concerns. Denies tobacco use. Denies fly hx of breast, ovarian, uterine or colon cancer. Cologuard  neg.     23 Uterus, cervix, fallopian tube, hysterectomy:  - Endometrium: Proliferative endometrium with benign hyperplastic polyp.  - Myometrium: Adenomyosis. Leiomyomata.  - Cervix: No significant changes.  - One Fallopian tube: No significant changes.           GYN & OB History:  Patient's last menstrual period was 2022 (approximate).   Date of Last Pap: 2022    OB History    Para Term  AB Living   2 2 2     2   SAB IAB Ectopic Multiple Live Births           2      # Outcome Date GA Lbr Kevin/2nd Weight Sex Type Anes PTL Lv   2 Term     M CS-LTranv  N LESLIE   1 Term     F CS-LTranv  N LESLIE      Obstetric Comments   BB 8lb 4oz   Triad: -       Past Medical History:   Diagnosis Date    Abnormal Pap smear of cervix     Anemia     Chronic back pain     Chronic hypertension     Obesity, Class III, BMI 40-49.9 (morbid obesity)     Other pulmonary embolism without acute cor pulmonale     history of PE in         Past Surgical History:   Procedure Laterality Date     SECTION      x2    CHOLECYSTECTOMY      CYSTOSCOPY N/A 2023    Procedure: CYSTOSCOPY;  Surgeon: Susie Thibodeaux MD;  Location: [facility];  Service: OB/GYN;  Laterality: N/A;    HYSTERECTOMY, TOTAL, LAPAROSCOPIC, WITH SALPINGECTOMY N/A  "12/5/2023    Procedure: HYSTERECTOMY,TOTAL,LAPAROSCOPIC,WITH SALPINGECTOMY;  Surgeon: Susie Thibodeaux MD;  [facility];  Service: OB/GYN;  Laterality: N/A;  *IODINE ALLERGY*  0 & 30 DEGREE SCOPE  T&M 2 UNITS    HYSTEROSCOPY WITH DILATION AND CURETTAGE OF UTERUS N/A 06/09/2022    Procedure: HYSTEROSCOPY, WITH DILATION AND CURETTAGE OF UTERUS;  Surgeon: Susie Thibodeaux MD;  Location: [facility];  Service: OB/GYN;  Laterality: N/A;  **MYOSURE**    TOTAL ABDOMINAL HYSTERECTOMY N/A 12/5/2023    Procedure: HYSTERECTOMY, TOTAL, ABDOMINAL WITH SALPINGECTOMY;  Surgeon: Susie Thibodeaux MD;  Location[facility];  Service: OB/GYN;  Laterality: N/A;    TUBAL LIGATION          Social History     Tobacco Use    Smoking status: Never    Smokeless tobacco: Never   Substance and Sexual Activity    Alcohol use: Never    Drug use: Never    Sexual activity: Not Currently     Partners: Male        Current Outpatient Medications   Medication Instructions    apixaban (ELIQUIS) 5 mg, Oral, 2 times daily    gabapentin (NEURONTIN) 300 mg, Nightly    HYDROcodone-acetaminophen (NORCO) 5-325 mg per tablet 1 tablet, 3 times daily    tiZANidine (ZANAFLEX) 4 mg, Nightly       Review of patient's allergies indicates:   Allergen Reactions    Shellfish containing products Hives and Itching         Review of Systems:  Review of Systems  Negative except for pertinent findings for positives per HPI.     Objective:   Physical Exam   Visit Vitals  /72 (BP Location: Right forearm, Patient Position: Sitting)   Pulse 90   Temp 97.6 °F (36.4 °C) (Oral)   Resp 18   Ht 5' 1" (1.549 m)   Wt 125.2 kg (276 lb)   LMP 02/05/2022 (Approximate)   SpO2 97%   BMI 52.15 kg/m²       GENERAL: Well-developed female in no acute distress.  SKIN: Normal to inspection,warm, dry and intact.  BREASTS: No rashes or erythema. No masses, lumps, discharge, tenderness.  VULVA: General appearance WNL; external genitalia with no lesions or erythema.  BIMANUAL EXAM: Vaginal " mucosa/vault atrophic, Vaginal cuff intact. Uterus/Cervix surgically absent. Bilateral adnexa reveal no tenderness.  PSYCHIATRIC: Patient is oriented to person, place, and time. Mood and affect are normal.    Assessment:       ICD-10-CM ICD-9-CM   1. Well woman exam with routine gynecological exam  Z01.419 V72.31   2. Encounter for screening mammogram for breast cancer  Z12.31 V76.12       Plan:     1. Well woman exam with routine gynecological exam    2. Encounter for screening mammogram for breast cancer    Pelvic exam today, pap deferred d/t hysterectomy of benign causes  MG ordered    Menopause is the permanent cessation of menses after loss of ovarian activity. Menopause is diagnosed after 12 months without menses. The menopausal transition is marked by hormonal fluctuations as ovarian function declines leading to a decrease in estrogen which results in a variety of physiologic and clinical symptoms. The most common menopausal symptoms are vasomotor symptoms and genitourinary symptoms.Vasomotors symptoms of menopause (also referred to as hot flushes/flashes) result from lack of estrogen which is thought to cause issues with thermoregulation leading to episodes of sudden onset extreme heat in the upper body, especially the face, neck, and chest. These episodes may interfere with sleep and daily activities.    There are both hormonal and non-hormonal treatments of vasomotor menopausal symptoms. Hormone replacement (HRT) and antidepressants (SSRIs/SNRIs) are available for the treatment of hot flushes and sleep problems associated with menopause along with Gabapentin, and Clonidine and Veozah (fezolinetant) is a newer medication available for treatment of vasomotor symptoms being other options.  Encouraged well balanced diet and exercise 3-4x per week; use of handheld and fan at bedside, keep home cooled. Ice packs. Encouraged wearing layers to remove as needed, light colored clothes and linen fabrics. Avoid spicy  foods and excessive caffeine use. Maintain healthy body weight. Quit smoking.  Exercise, yoga, meditation, paced respirations are all good coping techniques.   Personal hx of PE on eliquis    Call with any GYN concerns    Follow up in about 1 year (around 1/24/2026) for Annual.

## 2025-03-05 ENCOUNTER — LAB VISIT (OUTPATIENT)
Dept: LAB | Facility: HOSPITAL | Age: 55
End: 2025-03-05
Attending: NURSE PRACTITIONER
Payer: COMMERCIAL

## 2025-03-05 DIAGNOSIS — I26.99 PULMONARY EMBOLISM, UNSPECIFIED CHRONICITY, UNSPECIFIED PULMONARY EMBOLISM TYPE, UNSPECIFIED WHETHER ACUTE COR PULMONALE PRESENT: ICD-10-CM

## 2025-03-05 DIAGNOSIS — D50.0 IRON DEFICIENCY ANEMIA DUE TO CHRONIC BLOOD LOSS: ICD-10-CM

## 2025-03-05 DIAGNOSIS — D64.9 ANEMIA, UNSPECIFIED TYPE: Primary | ICD-10-CM

## 2025-03-05 DIAGNOSIS — N93.9 ABNORMAL UTERINE BLEEDING (AUB): ICD-10-CM

## 2025-03-05 DIAGNOSIS — N93.9 ABNORMAL UTERINE BLEEDING: ICD-10-CM

## 2025-03-05 DIAGNOSIS — Z01.818 PREOPERATIVE EXAM FOR GYNECOLOGIC SURGERY: ICD-10-CM

## 2025-03-05 DIAGNOSIS — Z13.6 SCREENING FOR HYPERTENSION: ICD-10-CM

## 2025-03-05 LAB
25(OH)D3+25(OH)D2 SERPL-MCNC: 43 NG/ML (ref 30–80)
ALBUMIN SERPL-MCNC: 3.5 G/DL (ref 3.5–5)
ALBUMIN/GLOB SERPL: 0.8 RATIO (ref 1.1–2)
ALP SERPL-CCNC: 74 UNIT/L (ref 40–150)
ALT SERPL-CCNC: 21 UNIT/L (ref 0–55)
ANION GAP SERPL CALC-SCNC: 7 MEQ/L
AST SERPL-CCNC: 22 UNIT/L (ref 5–34)
BACTERIA #/AREA URNS AUTO: ABNORMAL /HPF
BILIRUB SERPL-MCNC: 0.5 MG/DL
BILIRUB UR QL STRIP.AUTO: NEGATIVE
BUN SERPL-MCNC: 14.3 MG/DL (ref 9.8–20.1)
CALCIUM SERPL-MCNC: 9.1 MG/DL (ref 8.4–10.2)
CHLORIDE SERPL-SCNC: 107 MMOL/L (ref 98–107)
CHOLEST SERPL-MCNC: 140 MG/DL
CHOLEST/HDLC SERPL: 4 {RATIO} (ref 0–5)
CLARITY UR: CLEAR
CO2 SERPL-SCNC: 26 MMOL/L (ref 22–29)
COLOR UR AUTO: COLORLESS
CREAT SERPL-MCNC: 0.71 MG/DL (ref 0.55–1.02)
CREAT/UREA NIT SERPL: 20
EST. AVERAGE GLUCOSE BLD GHB EST-MCNC: 111.2 MG/DL
GFR SERPLBLD CREATININE-BSD FMLA CKD-EPI: >60 ML/MIN/1.73/M2
GLOBULIN SER-MCNC: 4.3 GM/DL (ref 2.4–3.5)
GLUCOSE SERPL-MCNC: 87 MG/DL (ref 74–100)
GLUCOSE UR QL STRIP: NORMAL
HBA1C MFR BLD: 5.5 %
HDLC SERPL-MCNC: 39 MG/DL (ref 35–60)
HGB UR QL STRIP: NEGATIVE
HYALINE CASTS #/AREA URNS LPF: ABNORMAL /LPF
KETONES UR QL STRIP: NEGATIVE
LDLC SERPL CALC-MCNC: 93 MG/DL (ref 50–140)
LEUKOCYTE ESTERASE UR QL STRIP: NEGATIVE
MUCOUS THREADS URNS QL MICRO: ABNORMAL /LPF
NITRITE UR QL STRIP: NEGATIVE
PH UR STRIP: 5.5 [PH]
POTASSIUM SERPL-SCNC: 3.9 MMOL/L (ref 3.5–5.1)
PROT SERPL-MCNC: 7.8 GM/DL (ref 6.4–8.3)
PROT UR QL STRIP: NEGATIVE
RBC #/AREA URNS AUTO: ABNORMAL /HPF
SODIUM SERPL-SCNC: 140 MMOL/L (ref 136–145)
SP GR UR STRIP.AUTO: 1.01 (ref 1–1.03)
SQUAMOUS #/AREA URNS LPF: ABNORMAL /HPF
TRIGL SERPL-MCNC: 41 MG/DL (ref 37–140)
TSH SERPL-ACNC: 0.6 UIU/ML (ref 0.35–4.94)
UROBILINOGEN UR STRIP-ACNC: NORMAL
VLDLC SERPL CALC-MCNC: 8 MG/DL
WBC #/AREA URNS AUTO: ABNORMAL /HPF

## 2025-03-05 PROCEDURE — 80061 LIPID PANEL: CPT

## 2025-03-05 PROCEDURE — 36415 COLL VENOUS BLD VENIPUNCTURE: CPT

## 2025-03-05 PROCEDURE — 81001 URINALYSIS AUTO W/SCOPE: CPT

## 2025-03-05 PROCEDURE — 82306 VITAMIN D 25 HYDROXY: CPT

## 2025-03-05 PROCEDURE — 84443 ASSAY THYROID STIM HORMONE: CPT

## 2025-03-05 PROCEDURE — 83036 HEMOGLOBIN GLYCOSYLATED A1C: CPT

## 2025-03-05 PROCEDURE — 80053 COMPREHEN METABOLIC PANEL: CPT

## 2025-03-10 ENCOUNTER — OFFICE VISIT (OUTPATIENT)
Dept: INTERNAL MEDICINE | Facility: CLINIC | Age: 55
End: 2025-03-10
Payer: COMMERCIAL

## 2025-03-10 VITALS
TEMPERATURE: 98 F | RESPIRATION RATE: 18 BRPM | HEART RATE: 81 BPM | HEIGHT: 61 IN | DIASTOLIC BLOOD PRESSURE: 78 MMHG | SYSTOLIC BLOOD PRESSURE: 130 MMHG | WEIGHT: 266 LBS | BODY MASS INDEX: 50.22 KG/M2

## 2025-03-10 DIAGNOSIS — Z00.00 WELL ADULT EXAM: ICD-10-CM

## 2025-03-10 DIAGNOSIS — E55.9 VITAMIN D DEFICIENCY: ICD-10-CM

## 2025-03-10 DIAGNOSIS — Z12.11 COLON CANCER SCREENING: ICD-10-CM

## 2025-03-10 DIAGNOSIS — D50.0 IRON DEFICIENCY ANEMIA DUE TO CHRONIC BLOOD LOSS: ICD-10-CM

## 2025-03-10 DIAGNOSIS — Z86.711 HISTORY OF PULMONARY EMBOLISM: ICD-10-CM

## 2025-03-10 DIAGNOSIS — E78.2 MIXED HYPERLIPIDEMIA: ICD-10-CM

## 2025-03-10 DIAGNOSIS — I26.99 PULMONARY EMBOLISM, UNSPECIFIED CHRONICITY, UNSPECIFIED PULMONARY EMBOLISM TYPE, UNSPECIFIED WHETHER ACUTE COR PULMONALE PRESENT: ICD-10-CM

## 2025-03-10 DIAGNOSIS — I10 PRIMARY HYPERTENSION: Primary | ICD-10-CM

## 2025-03-10 PROCEDURE — 1160F RVW MEDS BY RX/DR IN RCRD: CPT | Mod: CPTII,,, | Performed by: NURSE PRACTITIONER

## 2025-03-10 PROCEDURE — 3044F HG A1C LEVEL LT 7.0%: CPT | Mod: CPTII,,, | Performed by: NURSE PRACTITIONER

## 2025-03-10 PROCEDURE — 1159F MED LIST DOCD IN RCRD: CPT | Mod: CPTII,,, | Performed by: NURSE PRACTITIONER

## 2025-03-10 PROCEDURE — 3008F BODY MASS INDEX DOCD: CPT | Mod: CPTII,,, | Performed by: NURSE PRACTITIONER

## 2025-03-10 PROCEDURE — 99214 OFFICE O/P EST MOD 30 MIN: CPT | Mod: PBBFAC | Performed by: NURSE PRACTITIONER

## 2025-03-10 PROCEDURE — 3075F SYST BP GE 130 - 139MM HG: CPT | Mod: CPTII,,, | Performed by: NURSE PRACTITIONER

## 2025-03-10 PROCEDURE — 3078F DIAST BP <80 MM HG: CPT | Mod: CPTII,,, | Performed by: NURSE PRACTITIONER

## 2025-03-10 PROCEDURE — 99214 OFFICE O/P EST MOD 30 MIN: CPT | Mod: S$PBB,,, | Performed by: NURSE PRACTITIONER

## 2025-03-10 RX ORDER — HYDROCODONE BITARTRATE AND ACETAMINOPHEN 7.5; 325 MG/1; MG/1
1 TABLET ORAL 2 TIMES DAILY
COMMUNITY
Start: 2025-01-30

## 2025-03-10 NOTE — PROGRESS NOTES
Patient ID: [id number]     Chief Complaint: LAB RESULTS        HPI:     GIANLUCA Sparks is a 54 y.o. female here today for a follow up.         Optometrist:  Dentist:  Breast Cancer Screening:  [unfilled]   Cervical Cancer Screening:     Colorectal Cancer Screening:  Diabetic Eye Exam:  Diabetic Foot Exam:  Lung Cancer Screening:    Prostate Cancer Screening:  AAA Screening:  Osteoporosis Screening:  Medicare Wellness:   Immunizations:   Immunization History   Administered Date(s) Administered    COVID-19, MRNA, LN-S, PF (MODERNA FULL 0.5 ML DOSE) 01/15/2021, 2021    DTP 1971, 1971, 1974    MMR 2001    Measles / Rubella 1973    OPV 1971, 1973, 1974, 11/15/1977    Tdap 11/15/1977, 1983, 1993, 2006        -------------------------------------    Abnormal Pap smear of cervix    Anemia    Chronic back pain    Chronic hypertension    Obesity, Class III, BMI 40-49.9 (morbid obesity)    Other pulmonary embolism without acute cor pulmonale    history of PE in         Past Surgical History:   Procedure Laterality Date     SECTION      x2    CHOLECYSTECTOMY      CYSTOSCOPY N/A 2023    Procedure: CYSTOSCOPY;  Surgeon: Susie Thibodeaux MD;  Location: [facility];  Service: OB/GYN;  Laterality: N/A;    HYSTERECTOMY, TOTAL, LAPAROSCOPIC, WITH SALPINGECTOMY N/A 2023    Procedure: HYSTERECTOMY,TOTAL,LAPAROSCOPIC,WITH SALPINGECTOMY;  Surgeon: Susie Thibodeaux MD;  Location: [facility];  Service: OB/GYN;  Laterality: N/A;  *IODINE ALLERGY*  0 & 30 DEGREE SCOPE  T&M 2 UNITS    HYSTEROSCOPY WITH DILATION AND CURETTAGE OF UTERUS N/A 2022    Procedure: HYSTEROSCOPY, WITH DILATION AND CURETTAGE OF UTERUS;  Surgeon: Susie Thibodeaux MD;  Location: [facility];  Service: OB/GYN;  Laterality: N/A;  **MYOSURE**    TOTAL ABDOMINAL HYSTERECTOMY N/A 2023    Procedure: HYSTERECTOMY, TOTAL, ABDOMINAL WITH SALPINGECTOMY;  Surgeon:  Susie Thibodeaux MD;  Location: [facility];  Service: OB/GYN;  Laterality: N/A;    TUBAL LIGATION         Review of patient's allergies indicates:   Allergen Reactions    Shellfish containing products Hives and Itching       Current Outpatient Medications   Medication Instructions    apixaban (ELIQUIS) 5 mg, Oral, 2 times daily    gabapentin (NEURONTIN) 300 mg, Nightly    HYDROcodone-acetaminophen (NORCO) 7.5-325 mg per tablet 1 tablet, 2 times daily    tiZANidine (ZANAFLEX) 4 mg, Nightly       Social History[1]     Family History   Problem Relation Name Age of Onset    Hypertension Mother      No Known Problems Father          Patient Care Team:  Sera Villarreal FNP as PCP - General (Family Medicine)     Subjective:     Review of Systems     See HPI for details    Constitutional: Denies Change in appetite. Denies Chills. Denies Fever. Denies Night sweats.  Eye: Denies Blurred vision. Denies Discharge. Denies Eye pain.  ENT: Denies Decreased hearing. Denies Sore throat. Denies Swollen glands.  Respiratory: Denies Cough. Denies Shortness of breath. Denies Shortness of breath with exertion. Denies Wheezing.  Cardiovascular: DeniesChest pain at rest. Denies Chest pain with exertion. Denies Irregular heartbeat. Denies Palpitations. Denies Edema.  Gastrointestinal: Denies Abdominal pain. DeniesDiarrhea. Denies Nausea. Denies Vomiting. Denies Hematemesis or Hematochezia.  Genitourinary: Denies Dysuria. Denies Urinary frequency. Denies Urinary urgency. Denies Blood in urine.  Endocrine: Denies Cold intolerance. Denies Excessive thirst. Denies Heat intolerance. Denies Weight loss. Denies Weight gain.  Musculoskeletal: Denies Painful joints. Denies Weakness.  Integumentary: Denies Rash. Denies Itching. Denies Dry skin.  Neurologic: Denies Dizziness. Denies Fainting. Denies Headache.  Psychiatric: Denies Depression. Denies Anxiety. Denies Suicidal/Homicidal ideations.    All Other ROS: Negative except as stated in HPI.  "      Objective:     Visit Vitals  /78 (BP Location: Left arm, Patient Position: Sitting)   Pulse 81   Temp 98.3 °F (36.8 °C) (Oral)   Resp 18   Ht 5' 1" (1.549 m)   Wt 120.7 kg (266 lb)   LMP 02/05/2022 (Approximate)   BMI 50.26 kg/m²       Physical Exam    General: Alert and oriented, No acute distress.  Head: Normocephalic, Atraumatic.  Eye: Pupils are equal, round and reactive to light, Extraocular movements are intact, Sclera non-icteric.  Ears/Nose/Throat: Normal, Mucosa moist,Clear.  Neck/Thyroid: Supple, Non-tender, No carotid bruit, No lymphadenopathy, No JVD, Full range of motion.  Respiratory: Clear to auscultation bilaterally; No wheezes, rales or rhonchi,Non-labored respirations, Symmetrical chest wall expansion.  Cardiovascular: Regular rate and rhythm, S1/S2 normal, No murmurs, rubs or gallops.  Gastrointestinal: Soft, Non-tender, Non-distended, Normal bowel sounds, No palpable organomegaly.  Musculoskeletal: Normal range of motion.  Integumentary: Warm, Dry, Intact, No suspicious lesions or rashes.  Extremities: No clubbing, cyanosis or edema  Neurologic: No focal deficits, Cranial Nerves II-XII are grossly intact, Motor strength normal upper and lower extremities, Sensory exam intact.  Psychiatric: Normal interaction, Coherent speech, Euthymic mood, Appropriate affect       Labs Reviewed:     Chemistry:  Lab Results   Component Value Date     03/05/2025    K 3.9 03/05/2025    BUN 14.3 03/05/2025    CREATININE 0.71 03/05/2025    EGFRNORACEVR >60 03/05/2025    GLUCOSE 87 03/05/2025    CALCIUM 9.1 03/05/2025    ALKPHOS 74 03/05/2025    LABPROT 7.8 03/05/2025    ALBUMIN 3.5 03/05/2025    AST 22 03/05/2025    ALT 21 03/05/2025    MG 1.90 07/24/2023    PHOS 2.7 07/24/2023    [id number] 43 03/05/2025        Lab Results   Component Value Date    HGBA1C 5.5 03/05/2025        Hematology:  Lab Results   Component Value Date    WBC 6.96 05/24/2024    HGB 12.8 05/24/2024    HCT 39.3 05/24/2024    "  05/24/2024       Lipid Panel:  Lab Results   Component Value Date    CHOL 140 03/05/2025    HDL 39 03/05/2025    LDL 93.00 03/05/2025    TRIG 41 03/05/2025    TOTALCHOLEST 4 03/05/2025        Urine:  Lab Results   Component Value Date    APPEARANCEUA Clear 03/05/2025    SGUA 1.013 03/05/2025    PROTEINUA Negative 03/05/2025    KETONESUA Negative 03/05/2025    LEUKOCYTESUR Negative 03/05/2025    RBCUA 0-5 03/05/2025    WBCUA 0-5 03/05/2025    BACTERIA None Seen 03/05/2025    SQEPUA Trace (A) 03/05/2025    HYALINECASTS None Seen 03/05/2025        Assessment:       ICD-10-CM ICD-9-CM   1. Primary hypertension  I10 401.9   2. Mixed hyperlipidemia  E78.2 272.2   3. History of pulmonary embolism  Z86.711 V12.55   4. Anemia  D50.0 280.0   5. Vitamin D deficiency  E55.9 268.9   6. Colon cancer screening  Z12.11 V76.51   7. Well adult exam  Z00.00 V70.0   8. Pulmonary embolism, unspecified chronicity, unspecified pulmonary embolism type, unspecified whether acute cor pulmonale present  I26.99 415.19        Plan:     1. Primary hypertension (Primary)  BP controlled. Low fat low salt diet and exercise.     2. Mixed hyperlipidemia  Low fat diet and exercise.     3. History of pulmonary embolism  Pt on lifelong therapy of Eliquis. Cont as prescribed. ER precautions given.     4. Anemia  Resolved. Will monitor.     5. Vitamin D deficiency  Vit D level 43. Encouraged Vit D3 2000 IU 1 cap po daily OTC.     6. Colon cancer screening  Cologuard 12-15-24 negative results due 12/2027.    7. Well adult exam  Labs in 3 months. Keep GYN appt. UTD MMG. Cologuard 12-15-24 negative results due 12/2027.         Follow up in about 3 months (around 6/10/2025) for with labs 1 week prior to appt. . In addition to their scheduled follow up, the patient has also been instructed to follow up on as needed basis.     Future Appointments   Date Time Provider Department Center   1/26/2026 10:10 AM Kathleen Gamble FNP [facility]         Sera Villarreal, JUANA             [1]   Social History  Socioeconomic History    Marital status: Single   Tobacco Use    Smoking status: Never    Smokeless tobacco: Never   Substance and Sexual Activity    Alcohol use: Never    Drug use: Never    Sexual activity: Not Currently     Partners: Male     Social Drivers of Health     Financial Resource Strain: Medium Risk (3/9/2025)    Overall Financial Resource Strain (CARDIA)     Difficulty of Paying Living Expenses: Somewhat hard   Food Insecurity: Food Insecurity Present (3/9/2025)    Hunger Vital Sign     Worried About Running Out of Food in the Last Year: Sometimes true     Ran Out of Food in the Last Year: Never true   Transportation Needs: No Transportation Needs (3/9/2025)    PRAPARE - Transportation     Lack of Transportation (Medical): No     Lack of Transportation (Non-Medical): No   Physical Activity: Insufficiently Active (3/9/2025)    Exercise Vital Sign     Days of Exercise per Week: 2 days     Minutes of Exercise per Session: 20 min   Stress: No Stress Concern Present (3/9/2025)    [place] [employer] - Occupational Stress Questionnaire     Feeling of Stress : Only a little   Housing Stability: High Risk (3/9/2025)    Housing Stability Vital Sign     Unable to Pay for Housing in the Last Year: Yes     Number of Times Moved in the Last Year: 0     Homeless in the Last Year: No

## 2025-07-07 ENCOUNTER — OFFICE VISIT (OUTPATIENT)
Dept: INTERNAL MEDICINE | Facility: CLINIC | Age: 55
End: 2025-07-07
Payer: COMMERCIAL

## 2025-07-07 VITALS
BODY MASS INDEX: 50.98 KG/M2 | TEMPERATURE: 98 F | SYSTOLIC BLOOD PRESSURE: 133 MMHG | HEART RATE: 76 BPM | RESPIRATION RATE: 18 BRPM | DIASTOLIC BLOOD PRESSURE: 73 MMHG | HEIGHT: 61 IN | WEIGHT: 270 LBS

## 2025-07-07 DIAGNOSIS — I26.99 PULMONARY EMBOLISM, UNSPECIFIED CHRONICITY, UNSPECIFIED PULMONARY EMBOLISM TYPE, UNSPECIFIED WHETHER ACUTE COR PULMONALE PRESENT: ICD-10-CM

## 2025-07-07 DIAGNOSIS — Z12.31 ENCOUNTER FOR SCREENING MAMMOGRAM FOR MALIGNANT NEOPLASM OF BREAST: Primary | ICD-10-CM

## 2025-07-07 DIAGNOSIS — I10 PRIMARY HYPERTENSION: ICD-10-CM

## 2025-07-07 DIAGNOSIS — D50.0 IRON DEFICIENCY ANEMIA DUE TO CHRONIC BLOOD LOSS: ICD-10-CM

## 2025-07-07 DIAGNOSIS — Z86.711 HISTORY OF PULMONARY EMBOLISM: ICD-10-CM

## 2025-07-07 PROCEDURE — 3008F BODY MASS INDEX DOCD: CPT | Mod: CPTII,,, | Performed by: NURSE PRACTITIONER

## 2025-07-07 PROCEDURE — 1160F RVW MEDS BY RX/DR IN RCRD: CPT | Mod: CPTII,,, | Performed by: NURSE PRACTITIONER

## 2025-07-07 PROCEDURE — 1159F MED LIST DOCD IN RCRD: CPT | Mod: CPTII,,, | Performed by: NURSE PRACTITIONER

## 2025-07-07 PROCEDURE — 3044F HG A1C LEVEL LT 7.0%: CPT | Mod: CPTII,,, | Performed by: NURSE PRACTITIONER

## 2025-07-07 PROCEDURE — 99214 OFFICE O/P EST MOD 30 MIN: CPT | Mod: S$PBB,,, | Performed by: NURSE PRACTITIONER

## 2025-07-07 PROCEDURE — 3075F SYST BP GE 130 - 139MM HG: CPT | Mod: CPTII,,, | Performed by: NURSE PRACTITIONER

## 2025-07-07 PROCEDURE — 99215 OFFICE O/P EST HI 40 MIN: CPT | Mod: PBBFAC | Performed by: NURSE PRACTITIONER

## 2025-07-07 PROCEDURE — 3078F DIAST BP <80 MM HG: CPT | Mod: CPTII,,, | Performed by: NURSE PRACTITIONER

## 2025-07-07 NOTE — PROGRESS NOTES
Patient ID: [id number]     Chief Complaint: Establish Care        HPI:     HPI      Kely Sparks is a 54 y.o. female here today for a follow up.         Immunizations:   Immunization History   Administered Date(s) Administered    COVID-19, MRNA, LN-S, PF (MODERNA FULL 0.5 ML DOSE) 01/15/2021, 2021    DTP 1971, 1971, 1974    MMR 2001    Measles / Rubella 1973    OPV 1971, 1973, 1974, 11/15/1977    Tdap 11/15/1977, 1983, 1993, 2006        -------------------------------------    Abnormal Pap smear of cervix    Anemia    Chronic back pain    Chronic hypertension    Obesity, Class III, BMI 40-49.9 (morbid obesity)    Other pulmonary embolism without acute cor pulmonale    history of PE in         Past Surgical History:   Procedure Laterality Date     SECTION      x2    CHOLECYSTECTOMY      CYSTOSCOPY N/A 2023    Procedure: CYSTOSCOPY;  Surgeon: Susie Thibodeaux MD;  Location: [facility];  Service: OB/GYN;  Laterality: N/A;    HYSTERECTOMY, TOTAL, LAPAROSCOPIC, WITH SALPINGECTOMY N/A 2023    Procedure: HYSTERECTOMY,TOTAL,LAPAROSCOPIC,WITH SALPINGECTOMY;  Surgeon: Susie Thibodeaux MD;  Location: [facility];  Service: OB/GYN;  Laterality: N/A;  *IODINE ALLERGY*  0 & 30 DEGREE SCOPE  T&M 2 UNITS    HYSTEROSCOPY WITH DILATION AND CURETTAGE OF UTERUS N/A 2022    Procedure: HYSTEROSCOPY, WITH DILATION AND CURETTAGE OF UTERUS;  Surgeon: Susie Thibodeaux MD;  Location: [facility];  Service: OB/GYN;  Laterality: N/A;  **MYOSURE**    TOTAL ABDOMINAL HYSTERECTOMY N/A 2023    Procedure: HYSTERECTOMY, TOTAL, ABDOMINAL WITH SALPINGECTOMY;  Surgeon: Susie Thibodeaux MD;  Location: [facility];  Service: OB/GYN;  Laterality: N/A;    TUBAL LIGATION         Review of patient's allergies indicates:   Allergen Reactions    Shellfish containing products Hives and Itching       Current Outpatient Medications   Medication Instructions     "apixaban (ELIQUIS) 5 mg, Oral, 2 times daily    gabapentin (NEURONTIN) 300 mg, Nightly    HYDROcodone-acetaminophen (NORCO) 7.5-325 mg per tablet 1 tablet, 2 times daily    tiZANidine (ZANAFLEX) 4 mg, Nightly       Social History[1]     Family History   Problem Relation Name Age of Onset    Hypertension Mother      No Known Problems Father          Patient Care Team:  Sera Villarreal FNP as PCP - General (Family Medicine)     Subjective:     Review of Systems     See HPI for details    Constitutional: Denies Change in appetite. Denies Chills. Denies Fever. Denies Night sweats.  Eye: Denies Blurred vision. Denies Discharge. Denies Eye pain.  ENT: Denies Decreased hearing. Denies Sore throat. Denies Swollen glands.  Respiratory: Denies Cough. Denies Shortness of breath. Denies Shortness of breath with exertion. Denies Wheezing.  Cardiovascular: DeniesChest pain at rest. Denies Chest pain with exertion. Denies Irregular heartbeat. Denies Palpitations. Denies Edema.  Gastrointestinal: Denies Abdominal pain. DeniesDiarrhea. Denies Nausea. Denies Vomiting. Denies Hematemesis or Hematochezia.  Genitourinary: Denies Dysuria. Denies Urinary frequency. Denies Urinary urgency. Denies Blood in urine.  Endocrine: Denies Cold intolerance. Denies Excessive thirst. Denies Heat intolerance. Denies Weight loss. Denies Weight gain.  Musculoskeletal: Denies Painful joints. Denies Weakness.  Integumentary: Denies Rash. Denies Itching. Denies Dry skin.  Neurologic: Denies Dizziness. Denies Fainting. Denies Headache.  Psychiatric: Denies Depression. Denies Anxiety. Denies Suicidal/Homicidal ideations.    All Other ROS: Negative except as stated in HPI.       Objective:     Visit Vitals  /73   Pulse 76   Temp 98.3 °F (36.8 °C) (Oral)   Resp 18   Ht 5' 1" (1.549 m)   Wt 122.5 kg (270 lb)   LMP 02/05/2022 (Approximate)   BMI 51.02 kg/m²       Physical Exam    General: Alert and oriented, No acute distress.  Head: Normocephalic, " Atraumatic.  Eye: Pupils are equal, round and reactive to light, Extraocular movements are intact, Sclera non-icteric.  Ears/Nose/Throat: Normal, Mucosa moist,Clear.  Neck/Thyroid: Supple, Non-tender, No carotid bruit, No lymphadenopathy, No JVD, Full range of motion.  Respiratory: Clear to auscultation bilaterally; No wheezes, rales or rhonchi,Non-labored respirations, Symmetrical chest wall expansion.  Cardiovascular: Regular rate and rhythm, S1/S2 normal, No murmurs, rubs or gallops.  Gastrointestinal: Soft, Non-tender, Non-distended, Normal bowel sounds, No palpable organomegaly.  Musculoskeletal: Normal range of motion.  Integumentary: Warm, Dry, Intact, No suspicious lesions or rashes.  Extremities: No clubbing, cyanosis or edema  Neurologic: No focal deficits, Cranial Nerves II-XII are grossly intact, Motor strength normal upper and lower extremities, Sensory exam intact.  Psychiatric: Normal interaction, Coherent speech, Euthymic mood, Appropriate affect       Labs Reviewed:     Chemistry:  Lab Results   Component Value Date     03/05/2025    K 3.9 03/05/2025    BUN 14.3 03/05/2025    CREATININE 0.71 03/05/2025    EGFRNORACEVR >60 03/05/2025    CALCIUM 9.1 03/05/2025    ALKPHOS 74 03/05/2025    ALBUMIN 3.5 03/05/2025    AST 22 03/05/2025    ALT 21 03/05/2025    MG 1.90 07/24/2023    PHOS 2.7 07/24/2023    [id number] 43 03/05/2025        Lab Results   Component Value Date    HGBA1C 5.5 03/05/2025        Hematology:  Lab Results   Component Value Date    WBC 6.96 05/24/2024    HGB 12.8 05/24/2024    HCT 39.3 05/24/2024     05/24/2024       Lipid Panel:  Lab Results   Component Value Date    CHOL 140 03/05/2025    HDL 39 03/05/2025    LDL 93.00 03/05/2025    TRIG 41 03/05/2025    TOTALCHOLEST 4 03/05/2025        Urine:  Lab Results   Component Value Date    APPEARANCEUA Clear 03/05/2025    SGUA 1.013 03/05/2025    PROTEINUA Negative 03/05/2025    KETONESUA Negative 03/05/2025    LEUKOCYTESUR  Negative 03/05/2025    RBCUA 0-5 03/05/2025    WBCUA 0-5 03/05/2025    BACTERIA None Seen 03/05/2025    SQEPUA Trace (A) 03/05/2025    HYALINECASTS None Seen 03/05/2025        Assessment:       ICD-10-CM ICD-9-CM   1. Encounter for screening mammogram for malignant neoplasm of breast  Z12.31 V76.12   2. Primary hypertension  I10 401.9   3. Anemia  D50.0 280.0   4. History of pulmonary embolism  Z86.711 V12.55   5. Pulmonary embolism, unspecified chronicity, unspecified pulmonary embolism type, unspecified whether acute cor pulmonale present  I26.99 415.19        Plan:     1. Primary hypertension (Primary)  Low fat low salt diet and exercise.     2. Anemia  CBC stable. CBC in 4 months.     3. History of pulmonary embolism  Cont Eliquis as prescribed. ER precautions.          Follow up in about 4 months (around 11/7/2025) for with labs 1 week prior to appt. . In addition to their scheduled follow up, the patient has also been instructed to follow up on as needed basis.     Future Appointments   Date Time Provider Department Center   1/26/2026 10:10 AM Kathleen Gamble FNP [facility] [place]        JUANA Arredondo             [1]   Social History  Socioeconomic History    Marital status: Single   Tobacco Use    Smoking status: Never    Smokeless tobacco: Never   Substance and Sexual Activity    Alcohol use: Never    Drug use: Never    Sexual activity: Not Currently     Partners: Male     Social Drivers of Health     Financial Resource Strain: Medium Risk (3/9/2025)    Overall Financial Resource Strain (CARDIA)     Difficulty of Paying Living Expenses: Somewhat hard   Food Insecurity: Food Insecurity Present (3/9/2025)    Hunger Vital Sign     Worried About Running Out of Food in the Last Year: Sometimes true     Ran Out of Food in the Last Year: Never true   Transportation Needs: No Transportation Needs (3/9/2025)    PRAPARE - Transportation     Lack of Transportation (Medical): No     Lack of Transportation  (Non-Medical): No   Physical Activity: Insufficiently Active (3/9/2025)    Exercise Vital Sign     Days of Exercise per Week: 2 days     Minutes of Exercise per Session: 20 min   Stress: No Stress Concern Present (3/9/2025)    [place] [employer] - Occupational Stress Questionnaire     Feeling of Stress : Only a little   Housing Stability: High Risk (3/9/2025)    Housing Stability Vital Sign     Unable to Pay for Housing in the Last Year: Yes     Number of Times Moved in the Last Year: 0     Homeless in the Last Year: No

## (undated) DEVICE — GLOVE PROTEXIS NEU-THERA SZ6

## (undated) DEVICE — KIT LAPAROSCOPY UNIVERSITY

## (undated) DEVICE — PACK DRAPE PERI/GYN TIBURON

## (undated) DEVICE — SOL NACL IRR 3000ML

## (undated) DEVICE — CATH RED RUBBER LATEX 14F 16IN

## (undated) DEVICE — SEE L#152161

## (undated) DEVICE — SOL IRRI STRL WATER 1000ML

## (undated) DEVICE — GLOVE SIGNATURE MICRO LTX 6.5

## (undated) DEVICE — SOLIDIFIER BOTTLE 1500CC

## (undated) DEVICE — JELLY SURGILUBE LUBE PKT 3GM

## (undated) DEVICE — MANIP ARCH KOH-EFCNT UTER 3.5

## (undated) DEVICE — HANDLE DEVON RIGID OR LIGHT

## (undated) DEVICE — SEALER TISSUE ENSEAL X1 37CM

## (undated) DEVICE — PAD PINK TRENDELENBURG POS XL

## (undated) DEVICE — PAD PREP CUFFED NS 24X48IN

## (undated) DEVICE — LINER MEDI-VAC PPV FLTR 3000CC

## (undated) DEVICE — HEMOSTAT SURGICEL PWD 3G

## (undated) DEVICE — SUT CTD VICRYL 4-0 BR PS-2

## (undated) DEVICE — COVER HANDLE LIGHT RIGID

## (undated) DEVICE — GLOVE SIGNATURE MICRO LTX 7

## (undated) DEVICE — SYR IRRIGATION BULB STER 60ML

## (undated) DEVICE — TOWEL OR DISP STRL BLUE 4/PK

## (undated) DEVICE — SLEEVE KII ADV FIX 5X100MM

## (undated) DEVICE — GLOVE SENSICARE PI GRN 7.5

## (undated) DEVICE — MARKER WRITESITE SKIN CHLRAPRP

## (undated) DEVICE — SET CYSTO IRR DRP CHMBR 84IN

## (undated) DEVICE — SYR 50CC LL

## (undated) DEVICE — TUBING OUTFLOW/HYSTEROSCOPY

## (undated) DEVICE — GOWN X-LG STERILE BACK

## (undated) DEVICE — PAD PREP 50/CA

## (undated) DEVICE — TIP RUMI GREEN DISPOSABLE6.7MM

## (undated) DEVICE — SUT 3-0 VICRYL / SH (J416)

## (undated) DEVICE — SUT JJ41G O VICRYL

## (undated) DEVICE — ADHESIVE DERMABOND ADVANCED

## (undated) DEVICE — GLOVE SENSICARE PI GRN 6.5

## (undated) DEVICE — GLOVE PROTEXIS HYDROGEL SZ7

## (undated) DEVICE — SPONGE LAP STRL 18X18IN

## (undated) DEVICE — HEMOSTAT SURGICEL 2X3IN

## (undated) DEVICE — TRAY CATH FOL SIL URIMTR 16FR

## (undated) DEVICE — SEE MEDLINE ITEM 157181

## (undated) DEVICE — GAUZE VISTEC XR DTECT 16 4X4IN

## (undated) DEVICE — TUBING MEDI-VAC 20FT .25IN

## (undated) DEVICE — SUT 1 36IN PDS II

## (undated) DEVICE — GLOVE PROTEXIS HYDROGEL SZ6

## (undated) DEVICE — ELECTRODE EXTENDED BLADE

## (undated) DEVICE — GOWN POLY REINF BRTH SLV XL

## (undated) DEVICE — GLOVE PROTEXIS HYDROGEL SZ6.5

## (undated) DEVICE — TUBING INFLOW HYSTEROSCOPY

## (undated) DEVICE — KIT SURGICAL TURNOVER

## (undated) DEVICE — SPONGE LAP XRAY STERILE 18X18

## (undated) DEVICE — SOL NORMAL USPCA 0.9%

## (undated) DEVICE — TUBING INSUFFLATOR W/ROT CONCT

## (undated) DEVICE — DRAPE UNDERBUTTOCKS PCH STRL

## (undated) DEVICE — NDL ANES SPINAL 18X3.5ST 18G

## (undated) DEVICE — APPLICATOR CHLORAPREP ORN 26ML

## (undated) DEVICE — TROCAR KII FIOS 5MM X 100MM

## (undated) DEVICE — GLOVE SIGNATURE ESSNTL LTX 6.5

## (undated) DEVICE — IRRIGATOR SUCTION W/TIP

## (undated) DEVICE — TRAY SKIN SCRUB WET PREMIUM

## (undated) DEVICE — BLADE SURG STAINLESS STEEL #10

## (undated) DEVICE — DEVICE MYOSURE LITE TISS REM

## (undated) DEVICE — ELECTRODE PATIENT RETURN DISP

## (undated) DEVICE — SUPPORT ULNA NERVE PROTECTOR

## (undated) DEVICE — NDL HYPO REG 25G X 1 1/2

## (undated) DEVICE — SYR 10CC LUER LOCK

## (undated) DEVICE — SUPPORT DONUT ADULT 9IN

## (undated) DEVICE — GLOVE SIGNATURE ESSNTL LTX 6

## (undated) DEVICE — SYS SEE SHARP SCOPE ANTIFOG

## (undated) DEVICE — SEE MEDLINE ITEM 154981

## (undated) DEVICE — GLOVE SIGNATURE ESSNTL LTX 7

## (undated) DEVICE — DRESSING TELFA STRL 4X3 LF

## (undated) DEVICE — SUT MCRYL PLUS 4-0 PS2 27IN

## (undated) DEVICE — SCRUB DYNA-HEX LIQ 4% CHG 4OZ

## (undated) DEVICE — PAD ABDOMINAL STERILE 8X10IN

## (undated) DEVICE — GLOVE SENSICARE PI GRN 6